# Patient Record
Sex: FEMALE | Race: WHITE | NOT HISPANIC OR LATINO | Employment: UNEMPLOYED | URBAN - METROPOLITAN AREA
[De-identification: names, ages, dates, MRNs, and addresses within clinical notes are randomized per-mention and may not be internally consistent; named-entity substitution may affect disease eponyms.]

---

## 2020-01-09 LAB — NONINV COLON CA DNA+OCC BLD SCRN STL QL: NEGATIVE

## 2020-08-10 LAB
BUN SERPL-MCNC: 21 MG/DL (ref 7–18)
CHOLEST SERPL-MCNC: 212 MG/DL
CREAT SERPL-MCNC: 0.7 MG/DL (ref 0.6–1.3)
GLUCOSE SERPL-MCNC: 252 MG/DL (ref 74–106)
HDLC SERPL-MCNC: 39 MG/DL (ref 35–60)
TRIGL SERPL-MCNC: 499 MG/DL (ref 30–150)

## 2022-01-18 ENCOUNTER — HISTORICAL (OUTPATIENT)
Dept: LAB | Facility: HOSPITAL | Age: 57
End: 2022-01-18

## 2022-01-18 LAB
ABS NEUT (OLG): 4.97 X10(3)/MCL (ref 2.1–9.2)
ALBUMIN SERPL-MCNC: 3.5 GM/DL (ref 3.5–5)
ALBUMIN/GLOB SERPL: 0.9 RATIO (ref 1.1–2)
ALP SERPL-CCNC: 108 UNIT/L (ref 40–150)
ALT SERPL-CCNC: 17 UNIT/L (ref 0–55)
APPEARANCE, UA: CLEAR
AST SERPL-CCNC: 18 UNIT/L (ref 5–34)
BACTERIA SPEC CULT: NORMAL
BASOPHILS # BLD AUTO: 0.05 X10(3)/MCL (ref 0–0.2)
BASOPHILS NFR BLD AUTO: 0.5 % (ref 0–1)
BILIRUB SERPL-MCNC: 0.4 MG/DL (ref 0.2–1.2)
BILIRUB UR QL STRIP: NEGATIVE
BILIRUBIN DIRECT+TOT PNL SERPL-MCNC: 0.2 MG/DL (ref 0–0.5)
BILIRUBIN DIRECT+TOT PNL SERPL-MCNC: 0.2 MG/DL (ref 0–0.8)
BUN SERPL-MCNC: 34.8 MG/DL (ref 9.8–20.1)
CALCIUM SERPL-MCNC: 9.8 MG/DL (ref 8.4–10.2)
CHLORIDE SERPL-SCNC: 96 MMOL/L (ref 98–107)
CHOLEST SERPL-MCNC: 390 MG/DL
CHOLEST/HDLC SERPL: 11 {RATIO} (ref 0–5)
CO2 SERPL-SCNC: 26 MMOL/L (ref 22–29)
COLOR UR: YELLOW
CREAT SERPL-MCNC: 1.16 MG/DL (ref 0.57–1.11)
CREAT UR-MCNC: 88.3 MG/DL (ref 45–106)
EOSINOPHIL # BLD AUTO: 0.31 X10(3)/MCL (ref 0–0.9)
EOSINOPHIL NFR BLD AUTO: 3.3 % (ref 0–6.4)
ERYTHROCYTE [DISTWIDTH] IN BLOOD BY AUTOMATED COUNT: 13.1 % (ref 11.5–17)
EST. AVERAGE GLUCOSE BLD GHB EST-MCNC: 337.9 MG/DL
GLOBULIN SER-MCNC: 3.7 GM/DL (ref 2.4–3.5)
GLUCOSE (UA): ABNORMAL
GLUCOSE SERPL-MCNC: 518 MG/DL (ref 74–100)
HBA1C MFR BLD: 13.4 %
HCT VFR BLD AUTO: 38.6 % (ref 37–47)
HDLC SERPL-MCNC: 37 MG/DL (ref 40–60)
HGB BLD-MCNC: 13.2 GM/DL (ref 12–16)
HGB UR QL STRIP: ABNORMAL
IMM GRANULOCYTES # BLD AUTO: 0.03 10*3/UL (ref 0–0.02)
IMM GRANULOCYTES NFR BLD AUTO: 0.3 % (ref 0–0.43)
KETONES UR QL STRIP: NEGATIVE
LDLC SERPL CALC-MCNC: <69 MG/DL (ref 50–140)
LEUKOCYTE ESTERASE UR QL STRIP: NEGATIVE
LYMPHOCYTES # BLD AUTO: 3.21 X10(3)/MCL (ref 0.6–4.6)
LYMPHOCYTES NFR BLD AUTO: 34.6 % (ref 16–44)
MCH RBC QN AUTO: 30 PG (ref 27–31)
MCHC RBC AUTO-ENTMCNC: 34.2 GM/DL (ref 33–36)
MCV RBC AUTO: 87.7 FL (ref 80–94)
MICROALBUMIN UR-MCNC: 1078.4 UG/ML
MICROALBUMIN/CREAT RATIO PNL UR: 1221.3 MG/GM CR (ref 0–30)
MONOCYTES # BLD AUTO: 0.7 X10(3)/MCL (ref 0.1–1.3)
MONOCYTES NFR BLD AUTO: 7.6 % (ref 4–12.1)
NEUTROPHILS # BLD AUTO: 4.97 X10(3)/MCL (ref 2.1–9.2)
NEUTROPHILS NFR BLD AUTO: 53.7 % (ref 43–73)
NITRITE UR QL STRIP: NEGATIVE
NRBC BLD AUTO-RTO: 0 % (ref 0–0.2)
PH UR STRIP: 5.5 [PH] (ref 5–7)
PLATELET # BLD AUTO: 315 X10(3)/MCL (ref 130–400)
PMV BLD AUTO: 9.2 FL (ref 7.4–10.4)
POTASSIUM SERPL-SCNC: 5.2 MMOL/L (ref 3.5–5.1)
PROT SERPL-MCNC: 7.2 GM/DL (ref 6.4–8.3)
PROT UR QL STRIP: ABNORMAL
RBC # BLD AUTO: 4.4 X10(6)/MCL (ref 4.2–5.4)
RBC #/AREA URNS HPF: NORMAL /HPF
SODIUM SERPL-SCNC: 131 MMOL/L (ref 136–145)
SP GR UR STRIP: >=1.03 (ref 1–1.03)
SQUAMOUS EPITHELIAL, UA: NORMAL /LPF
TRIGL SERPL-MCNC: >1420 MG/DL (ref 0–150)
TSH SERPL-ACNC: 3.27 UIU/ML (ref 0.35–4.94)
UROBILINOGEN UR STRIP-ACNC: NEGATIVE
VLDLC SERPL CALC-MCNC: >284 MG/DL
WBC # SPEC AUTO: 9.3 X10(3)/MCL (ref 4.5–11.5)
WBC #/AREA URNS HPF: NORMAL /HPF

## 2022-05-02 RX ORDER — INSULIN GLARGINE 300 U/ML
120 INJECTION, SOLUTION SUBCUTANEOUS 2 TIMES DAILY
Qty: 4 PEN | Refills: 11 | Status: SHIPPED | OUTPATIENT
Start: 2022-05-02 | End: 2022-05-24 | Stop reason: SDUPTHER

## 2022-05-02 RX ORDER — INSULIN GLARGINE 300 U/ML
120 INJECTION, SOLUTION SUBCUTANEOUS 2 TIMES DAILY
COMMUNITY
Start: 2022-02-16 | End: 2022-05-02 | Stop reason: SDUPTHER

## 2022-05-24 RX ORDER — INSULIN GLARGINE 300 U/ML
120 INJECTION, SOLUTION SUBCUTANEOUS 2 TIMES DAILY
Qty: 4 PEN | Refills: 11 | Status: SHIPPED | OUTPATIENT
Start: 2022-05-24 | End: 2022-06-08 | Stop reason: CLARIF

## 2022-05-24 NOTE — TELEPHONE ENCOUNTER
"Please review and sign:  Armenian from [place] [address] pharmacy called wanting to verify pt's dose of Toujeo which they received as 120 units BID. Pharmacist states this is an abnormally high dose which was "red-flagged" as needing clarification. Spoke with Dr. Weinberg who confirmed he does want this dose and that may increase quantity to 30 day supply. Verbal order given to Armenian at [place] [address] to keep dose as 120 units BID and increase quantity to 30 day supply.   "

## 2022-05-27 ENCOUNTER — TELEPHONE (OUTPATIENT)
Dept: FAMILY MEDICINE | Facility: CLINIC | Age: 57
End: 2022-05-27
Payer: COMMERCIAL

## 2022-05-27 NOTE — TELEPHONE ENCOUNTER
Attempted PA on CMM which failed. Message to submit bill to other processor or primary payer. Verified rx benefits then per recommendation of Counts include 234 beds at the [facility], called [employer] to attempt PA. Kandy with [employer] stated no PA was needed and that pharmacy would need to call to do Cost Override. Called [facility] pharmacy and notified Sabiha in pharmacy of needed Cost Override. She verbalized understanding. Attempted to call pt's . No answer. Left vm advising him to check with pharmacy due to they will be providing the cost override.

## 2022-05-27 NOTE — TELEPHONE ENCOUNTER
----- Message from Natalia Nash sent at 5/26/2022  3:42 PM CDT -----  Regarding: Advice  Type:  Needs Medical Advice    Who Called: Patients    Symptoms (please be specific):   How long has patient had these symptoms:    Pharmacy name and phone #:  [facility] on [address]  Would the patient rather a call back or a response via [facility]? Call  Best Call Back Number: [phone]  Additional Information: Pharmacy sent over a required PA for the office for Patients insulin. He was calling to check the status because pt is almost out of her insulin. He would like a call back.

## 2022-06-03 ENCOUNTER — TELEPHONE (OUTPATIENT)
Dept: FAMILY MEDICINE | Facility: CLINIC | Age: 57
End: 2022-06-03
Payer: COMMERCIAL

## 2022-06-03 NOTE — TELEPHONE ENCOUNTER
----- Message from Jelani Harris sent at 6/3/2022 10:43 AM CDT -----  .Type:  Needs Medical Advice    Who Called: Pt's spouse  Symptoms (please be specific):    How long has patient had these symptoms:    Pharmacy name and phone #:    Would the patient rather a call back or a response via [facility]?   Best Call Back Number:   Additional Information: He was calling to check on status of pre authorization they had not heard anything. Fax # [phone] name of insurance, [employer] name of pharmacy is  [employer].  This is for her insulin and she will be out today.

## 2022-06-03 NOTE — TELEPHONE ENCOUNTER
Called and left pt message that received fax from pharmacy stating no PA needed d/t med covered under plan and for them to check with their pharmacy. See scanned document regarding PA not needed.

## 2022-06-08 ENCOUNTER — TELEPHONE (OUTPATIENT)
Dept: FAMILY MEDICINE | Facility: CLINIC | Age: 57
End: 2022-06-08
Payer: COMMERCIAL

## 2022-06-08 RX ORDER — INSULIN DETEMIR 100 [IU]/ML
100 INJECTION, SOLUTION SUBCUTANEOUS 2 TIMES DAILY
Qty: 60 ML | Refills: 0 | Status: SHIPPED | OUTPATIENT
Start: 2022-06-08 | End: 2023-06-22 | Stop reason: SDUPTHER

## 2022-06-08 NOTE — TELEPHONE ENCOUNTER
----- Message from Aquilino Weinberg MD sent at 6/8/2022  5:26 PM CDT -----  Regarding: RE: Advice  Changed to levemir -- JA        ----- Message -----  From: Rosie Mccloud LPN  Sent: 6/8/2022   5:22 PM CDT  To: Aquilino Weinberg MD  Subject: FW: Advice                                       Please see message below and change Toujeo. Please send [facility] to [facility] [place]. Thanks.   ----- Message -----  From: Natalia Nash  Sent: 6/8/2022   1:19 PM CDT  To: [facility] Davis Staff  Subject: Advice                                           Type:  Needs Medical Advice    Who Called: Patient  Pharmacy name and phone #:  [facility] [place]  Would the patient rather a call back or a response via [facility]? Call  Best Call Back Number: [phone]  Additional Information: Insurance has stopped paying for patients insulin glargine U-300 conc (TOUJEO MAX U-300 SOLOSTAR) 300 unit/mL (3 mL) insulin pen. She is requesting another insulin to be called in at the pharmacy above. Please Advise.

## 2022-07-28 ENCOUNTER — EXTERNAL HOSPITAL ADMISSION (OUTPATIENT)
Dept: ADMINISTRATIVE | Facility: CLINIC | Age: 57
End: 2022-07-28
Payer: COMMERCIAL

## 2022-07-28 ENCOUNTER — PATIENT OUTREACH (OUTPATIENT)
Dept: ADMINISTRATIVE | Facility: CLINIC | Age: 57
End: 2022-07-28
Payer: COMMERCIAL

## 2022-07-28 NOTE — PROGRESS NOTES
C3 nurse attempted to contact Cleopatra Campos for a TCC post hospital discharge follow up call. No answer at phone number listed.     The patient does not have a scheduled HOSFU appointment. Message sent to PCP's staff to assist with HOSFU appointment scheduling.

## 2022-07-29 NOTE — PROGRESS NOTES
Patient's son answered patient's phone and stated she was napping.  Requested a call back later today.

## 2022-07-29 NOTE — PROGRESS NOTES
C3 nurse attempted to contact Cleopatra Campos   for a TCC post hospital discharge follow up call. No answer. Left voicemail with callback information. The patient does not have a scheduled HOSFU appointment. Message sent to PCP staff for assistance with scheduling visit with patient.

## 2022-08-02 ENCOUNTER — PATIENT OUTREACH (OUTPATIENT)
Dept: ADMINISTRATIVE | Facility: HOSPITAL | Age: 57
End: 2022-08-02
Payer: COMMERCIAL

## 2022-08-02 NOTE — PROGRESS NOTES
Population Health Outreach.Records Received, hyper-linked into chart at this time. The following record(s)  below were uploaded for Health Maintenance .             1/9/20 WILLIAMS

## 2022-08-08 ENCOUNTER — OFFICE VISIT (OUTPATIENT)
Dept: FAMILY MEDICINE | Facility: CLINIC | Age: 57
End: 2022-08-08
Payer: COMMERCIAL

## 2022-08-08 VITALS
SYSTOLIC BLOOD PRESSURE: 138 MMHG | DIASTOLIC BLOOD PRESSURE: 87 MMHG | HEART RATE: 66 BPM | TEMPERATURE: 98 F | HEIGHT: 60 IN | BODY MASS INDEX: 39.07 KG/M2 | OXYGEN SATURATION: 97 % | WEIGHT: 199 LBS | RESPIRATION RATE: 22 BRPM

## 2022-08-08 DIAGNOSIS — E78.1 HYPERTRIGLYCERIDEMIA: Chronic | ICD-10-CM

## 2022-08-08 DIAGNOSIS — E78.2 MIXED HYPERLIPIDEMIA: Chronic | ICD-10-CM

## 2022-08-08 DIAGNOSIS — Z09 HOSPITAL DISCHARGE FOLLOW-UP: Primary | ICD-10-CM

## 2022-08-08 DIAGNOSIS — E66.01 CLASS 2 SEVERE OBESITY DUE TO EXCESS CALORIES WITH SERIOUS COMORBIDITY AND BODY MASS INDEX (BMI) OF 38.0 TO 38.9 IN ADULT: Chronic | ICD-10-CM

## 2022-08-08 DIAGNOSIS — I25.2 HISTORY OF MI (MYOCARDIAL INFARCTION): ICD-10-CM

## 2022-08-08 DIAGNOSIS — E11.59 HYPERTENSION ASSOCIATED WITH TYPE 2 DIABETES MELLITUS: Chronic | ICD-10-CM

## 2022-08-08 DIAGNOSIS — E27.1 PRIMARY ADRENAL INSUFFICIENCY: Chronic | ICD-10-CM

## 2022-08-08 DIAGNOSIS — I10 PRIMARY HYPERTENSION: Chronic | ICD-10-CM

## 2022-08-08 DIAGNOSIS — E03.4 ACQUIRED ATROPHY OF THYROID: Chronic | ICD-10-CM

## 2022-08-08 DIAGNOSIS — Z99.81 SUPPLEMENTAL OXYGEN DEPENDENT: ICD-10-CM

## 2022-08-08 DIAGNOSIS — I15.2 HYPERTENSION ASSOCIATED WITH TYPE 2 DIABETES MELLITUS: Chronic | ICD-10-CM

## 2022-08-08 DIAGNOSIS — E11.65 TYPE 2 DIABETES MELLITUS WITH HYPERGLYCEMIA, WITH LONG-TERM CURRENT USE OF INSULIN: Chronic | ICD-10-CM

## 2022-08-08 DIAGNOSIS — E11.69 MIXED HYPERLIPIDEMIA DUE TO TYPE 2 DIABETES MELLITUS: Chronic | ICD-10-CM

## 2022-08-08 DIAGNOSIS — I25.118 CORONARY ARTERY DISEASE OF NATIVE ARTERY OF NATIVE HEART WITH STABLE ANGINA PECTORIS: Chronic | ICD-10-CM

## 2022-08-08 DIAGNOSIS — Z79.4 LONG-TERM INSULIN USE: Chronic | ICD-10-CM

## 2022-08-08 DIAGNOSIS — Z79.4 TYPE 2 DIABETES MELLITUS WITH HYPERGLYCEMIA, WITH LONG-TERM CURRENT USE OF INSULIN: Chronic | ICD-10-CM

## 2022-08-08 DIAGNOSIS — Z79.4 TYPE 2 DIABETES MELLITUS WITH STAGE 3A CHRONIC KIDNEY DISEASE, WITH LONG-TERM CURRENT USE OF INSULIN: Chronic | ICD-10-CM

## 2022-08-08 DIAGNOSIS — I48.11 LONGSTANDING PERSISTENT ATRIAL FIBRILLATION: Chronic | ICD-10-CM

## 2022-08-08 DIAGNOSIS — Z95.1 S/P CABG (CORONARY ARTERY BYPASS GRAFT): Chronic | ICD-10-CM

## 2022-08-08 DIAGNOSIS — E11.22 TYPE 2 DIABETES MELLITUS WITH STAGE 3A CHRONIC KIDNEY DISEASE, WITH LONG-TERM CURRENT USE OF INSULIN: Chronic | ICD-10-CM

## 2022-08-08 DIAGNOSIS — N18.31 TYPE 2 DIABETES MELLITUS WITH STAGE 3A CHRONIC KIDNEY DISEASE, WITH LONG-TERM CURRENT USE OF INSULIN: Chronic | ICD-10-CM

## 2022-08-08 DIAGNOSIS — N18.31 CHRONIC KIDNEY DISEASE, STAGE 3A: Chronic | ICD-10-CM

## 2022-08-08 DIAGNOSIS — E78.2 MIXED HYPERLIPIDEMIA DUE TO TYPE 2 DIABETES MELLITUS: Chronic | ICD-10-CM

## 2022-08-08 PROBLEM — Z78.9 ACE INHIBITOR INTOLERANCE: Status: ACTIVE | Noted: 2022-08-08

## 2022-08-08 PROBLEM — R39.9 SYMPTOMS INVOLVING URINARY SYSTEM: Status: ACTIVE | Noted: 2022-08-08

## 2022-08-08 PROBLEM — I82.412 ACUTE DEEP VEIN THROMBOSIS (DVT) OF FEMORAL VEIN OF LEFT LOWER EXTREMITY: Status: ACTIVE | Noted: 2022-07-03

## 2022-08-08 PROBLEM — M19.90 OSTEOARTHRITIS: Status: ACTIVE | Noted: 2022-08-08

## 2022-08-08 PROBLEM — I82.A12 ACUTE DEEP VEIN THROMBOSIS (DVT) OF AXILLARY VEIN OF LEFT UPPER EXTREMITY: Status: ACTIVE | Noted: 2022-07-27

## 2022-08-08 PROBLEM — E03.9 HYPOTHYROIDISM: Status: ACTIVE | Noted: 2022-07-03

## 2022-08-08 PROBLEM — Z90.710 HISTORY OF HYSTERECTOMY: Chronic | Status: ACTIVE | Noted: 2022-08-08

## 2022-08-08 PROBLEM — I48.91 ATRIAL FIBRILLATION: Chronic | Status: ACTIVE | Noted: 2022-07-14

## 2022-08-08 PROBLEM — I48.91 ATRIAL FIBRILLATION: Status: ACTIVE | Noted: 2022-07-14

## 2022-08-08 PROBLEM — R40.0 DAYTIME SOMNOLENCE: Status: ACTIVE | Noted: 2022-08-08

## 2022-08-08 PROBLEM — F60.3 EMOTIONAL INSTABILITY: Status: ACTIVE | Noted: 2022-08-08

## 2022-08-08 PROBLEM — Z12.31 BREAST CANCER SCREENING BY MAMMOGRAM: Status: ACTIVE | Noted: 2022-08-08

## 2022-08-08 PROBLEM — E66.812 CLASS 2 SEVERE OBESITY DUE TO EXCESS CALORIES WITH SERIOUS COMORBIDITY AND BODY MASS INDEX (BMI) OF 38.0 TO 38.9 IN ADULT: Chronic | Status: ACTIVE | Noted: 2022-08-08

## 2022-08-08 PROBLEM — E11.9 TYPE 2 DIABETES MELLITUS: Status: RESOLVED | Noted: 2022-07-03 | Resolved: 2022-08-08

## 2022-08-08 PROBLEM — T46.4X5A ADVERSE EFFECT OF ANGIOTENSIN-CONVERTING ENZYME INHIBITOR: Status: ACTIVE | Noted: 2022-08-08

## 2022-08-08 PROBLEM — E11.9 TYPE 2 DIABETES MELLITUS: Status: ACTIVE | Noted: 2022-07-03

## 2022-08-08 PROBLEM — E03.9 HYPOTHYROIDISM: Status: RESOLVED | Noted: 2022-07-03 | Resolved: 2022-08-08

## 2022-08-08 PROBLEM — G47.10 HYPERSOMNIA: Status: ACTIVE | Noted: 2022-08-08

## 2022-08-08 PROBLEM — D75.829 HIT (HEPARIN-INDUCED THROMBOCYTOPENIA): Status: ACTIVE | Noted: 2022-07-27

## 2022-08-08 PROBLEM — Z90.710 HISTORY OF HYSTERECTOMY: Status: ACTIVE | Noted: 2022-08-08

## 2022-08-08 PROCEDURE — 99495 TRANSJ CARE MGMT MOD F2F 14D: CPT | Mod: ,,, | Performed by: FAMILY MEDICINE

## 2022-08-08 PROCEDURE — 99495 TCM SERVICES (MODERATE COMPLEXITY): ICD-10-PCS | Mod: ,,, | Performed by: FAMILY MEDICINE

## 2022-08-08 RX ORDER — DULOXETIN HYDROCHLORIDE 30 MG/1
30 CAPSULE, DELAYED RELEASE ORAL DAILY
COMMUNITY
Start: 2022-01-28 | End: 2023-01-05 | Stop reason: SDUPTHER

## 2022-08-08 RX ORDER — HYDROCORTISONE 5 MG/1
15 TABLET ORAL DAILY
COMMUNITY
Start: 2022-07-28 | End: 2023-01-23

## 2022-08-08 RX ORDER — ONDANSETRON 4 MG/1
4 TABLET, FILM COATED ORAL EVERY 8 HOURS PRN
COMMUNITY
Start: 2022-08-05 | End: 2023-01-23 | Stop reason: SDUPTHER

## 2022-08-08 RX ORDER — LOSARTAN POTASSIUM 100 MG/1
1 TABLET ORAL DAILY
COMMUNITY
Start: 2022-07-28 | End: 2022-08-29 | Stop reason: SDUPTHER

## 2022-08-08 RX ORDER — INSULIN PUMP SYRINGE, 3 ML
EACH MISCELLANEOUS
Qty: 1 EACH | Refills: 0 | Status: SHIPPED | OUTPATIENT
Start: 2022-08-08 | End: 2023-06-22

## 2022-08-08 RX ORDER — METOPROLOL TARTRATE 50 MG/1
1 TABLET ORAL 2 TIMES DAILY
COMMUNITY
Start: 2022-07-27 | End: 2022-08-29 | Stop reason: SDUPTHER

## 2022-08-08 RX ORDER — FENOFIBRATE 160 MG/1
160 TABLET ORAL DAILY
COMMUNITY
Start: 2021-09-13

## 2022-08-08 RX ORDER — ATORVASTATIN CALCIUM 80 MG/1
80 TABLET, FILM COATED ORAL DAILY
COMMUNITY
Start: 2022-02-18 | End: 2023-06-22 | Stop reason: SDUPTHER

## 2022-08-08 RX ORDER — PEN NEEDLE, DIABETIC 32GX 5/32"
NEEDLE, DISPOSABLE MISCELLANEOUS
COMMUNITY
Start: 2022-06-09

## 2022-08-08 RX ORDER — APIXABAN 5 MG/1
1 TABLET, FILM COATED ORAL 2 TIMES DAILY
COMMUNITY
Start: 2022-08-01 | End: 2022-08-29 | Stop reason: SDUPTHER

## 2022-08-08 RX ORDER — HYDROCORTISONE 10 MG/1
10 TABLET ORAL DAILY
COMMUNITY
Start: 2022-07-27 | End: 2023-01-23

## 2022-08-08 RX ORDER — LIRAGLUTIDE 6 MG/ML
1.8 INJECTION SUBCUTANEOUS DAILY
COMMUNITY
Start: 2021-11-30 | End: 2023-01-05 | Stop reason: ALTCHOICE

## 2022-08-08 RX ORDER — LEVOTHYROXINE SODIUM 100 UG/1
100 TABLET ORAL DAILY
COMMUNITY
Start: 2021-11-22 | End: 2023-01-23 | Stop reason: SDUPTHER

## 2022-08-08 NOTE — PROGRESS NOTES
Transitional Care Note    Family and/or Caretaker present at visit?  Yes.  Diagnostic tests reviewed/disposition: No diagnosic tests pending after this hospitalization.  Disease/illness education: yes, especially therapy (cardiac rehab)  Home health/community services discussion/referrals: Patient has home health established at [facility].   Establishment or re-establishment of referral orders for community resources: No other necessary community resources.   Discussion with other health care providers: No discussion with other health care providers necessary. - NEED DISCHARGE SUMMARY               Subjective:      Patient ID: Cleopatra Campos is a 57 y.o. female.    Chief Complaint: Follow-up (Pt here for post-hospitalization f/u. S/p CABG x2/MI on 7/4/22 at [facility]. Pt now on O2 at 2 lpm due to SOB on exertion. )    Problem List Items Addressed This Visit     Acquired atrophy of thyroid (Chronic)    Atrial fibrillation (Chronic)    Overview     Last Assessment & Plan:   Formatting of this note might be different from the original.  Her ECG from several years ago clearly shows a narrow complex tachycardia. Difficult to know whether the VT or the SVT has been the most symptomatic, but I do think there are two distinct arrhythmias.           Coronary artery disease of native artery of native heart with stable angina pectoris (Chronic)    Overview     Formatting of this note might be different from the original.  Added automatically from request for surgery [id number]           Primary hypertension (Chronic)    Hypertriglyceridemia (Chronic)    Mixed hyperlipidemia (Chronic)    Class 2 severe obesity due to excess calories with serious comorbidity and body mass index (BMI) of 38.0 to 38.9 in adult (Chronic)    Primary adrenal insufficiency (Chronic)    S/P CABG (coronary artery bypass graft) (Chronic)    Overview     Last Assessment & Plan:   Formatting of this note might be different from the original.  s/p CABG x 2,  sternal plating on 7/4/22  Postop management per CTS.  Patient stable on 3 L via nasal cannula.  Chest x-ray revealed continued left sided airspace opacities with no interval detrimental change.  Continue mobilization as tolerated  Encourage IS use, 10 times every hour  PT/OT  Chest tubes removed today.           Type 2 diabetes mellitus with hyperglycemia (Chronic)    Relevant Medications    liraglutide 0.6 mg/0.1 mL, 18 mg/3 mL, subq PNIJ (VICTOZA 2-DREW) 0.6 mg/0.1 mL (18 mg/3 mL) PnIj pen    Long-term insulin use (Chronic)    Chronic kidney disease, stage 3a (Chronic)    Type 2 diabetes mellitus with stage 3a chronic kidney disease (Chronic)    Relevant Medications    liraglutide 0.6 mg/0.1 mL, 18 mg/3 mL, subq PNIJ (VICTOZA 2-DREW) 0.6 mg/0.1 mL (18 mg/3 mL) PnIj pen    Mixed hyperlipidemia due to type 2 diabetes mellitus (Chronic)    Relevant Medications    liraglutide 0.6 mg/0.1 mL, 18 mg/3 mL, subq PNIJ (VICTOZA 2-DREW) 0.6 mg/0.1 mL (18 mg/3 mL) PnIj pen    Hypertension associated with type 2 diabetes mellitus (Chronic)    Relevant Medications    liraglutide 0.6 mg/0.1 mL, 18 mg/3 mL, subq PNIJ (VICTOZA 2-DREW) 0.6 mg/0.1 mL (18 mg/3 mL) PnIj pen    BMI 38.0-38.9,adult (Chronic)    History of MI (myocardial infarction)    Hospital discharge follow-up - Primary      Other Visit Diagnoses     Supplemental oxygen dependent              The patient's Health Maintenance was reviewed and the following appears to be due:   Health Maintenance Due   Topic Date Due    Hepatitis C Screening  Never done    HIV Screening  Never done    TETANUS VACCINE  Never done    Mammogram  Never done    Shingles Vaccine (1 of 2) Never done    COVID-19 Vaccine (2 - Mixed Product series) 11/18/2021       Past Medical History:  Past Medical History:   Diagnosis Date    Clotting disorder     Coronary artery disease     Diabetes mellitus, type 2     DVT (deep venous thrombosis)     Heart attack     Hyperlipidemia      "Hypertension     Hypothyroidism     Thyroid disease      Past Surgical History:   Procedure Laterality Date    APPENDECTOMY      CARDIAC CATHETERIZATION      CARPAL TUNNEL RELEASE       SECTION      CORONARY ARTERY BYPASS GRAFT      HYSTERECTOMY      ROTATOR CUFF REPAIR Right     TRIGGER FINGER RELEASE       Review of patient's allergies indicates:   Allergen Reactions    Enoxaparin Other (See Comments)     Heparin induced thrombocytopenia     Meperidine Swelling     Current Outpatient Medications on File Prior to Visit   Medication Sig Dispense Refill    atorvastatin (LIPITOR) 80 MG tablet Take 80 mg by mouth once daily.      BD GINA 2ND GEN PEN NEEDLE 32 gauge x 5/32" Ndle       DULoxetine (CYMBALTA) 30 MG capsule Take 30 mg by mouth once daily.      ELIQUIS 5 mg Tab Take 1 tablet by mouth 2 (two) times a day.      fenofibrate 160 MG Tab Take 160 mg by mouth once daily.      hydrocortisone (CORTEF) 10 MG Tab Take 10 mg by mouth once daily. At 3 pm      hydrocortisone (CORTEF) 5 MG Tab Take 15 mg by mouth once daily. With breakfast.      insulin detemir U-100 (LEVEMIR FLEXTOUCH U-100 INSULN) 100 unit/mL (3 mL) InPn pen Inject 100 Units into the skin 2 (two) times daily. 60 mL 0    levothyroxine (SYNTHROID) 100 MCG tablet Take 100 mcg by mouth once daily.      liraglutide 0.6 mg/0.1 mL, 18 mg/3 mL, subq PNIJ (VICTOZA 2-DREW) 0.6 mg/0.1 mL (18 mg/3 mL) PnIj pen Inject 1.8 mg into the skin once daily.      losartan (COZAAR) 100 MG tablet Take 1 tablet by mouth once daily.      metoprolol tartrate (LOPRESSOR) 50 MG tablet Take 1 tablet by mouth 2 (two) times daily.      ondansetron (ZOFRAN) 4 MG tablet Take 4 mg by mouth every 8 (eight) hours as needed.       Current Facility-Administered Medications on File Prior to Visit   Medication Dose Route Frequency Provider Last Rate Last Admin    [DISCONTINUED] GENERIC EXTERNAL MEDICATION     Generic External Data Provider        " [DISCONTINUED] GENERIC EXTERNAL MEDICATION     Generic External Data Provider        [DISCONTINUED] GENERIC EXTERNAL MEDICATION     Generic External Data Provider         Social History     Socioeconomic History    Marital status:     Number of children: 3   Occupational History    Occupation: Retired    Tobacco Use    Smoking status: Former Smoker    Smokeless tobacco: Never Used    Tobacco comment: Pt quit as of recent hospitalization for CABG/MI.    Substance and Sexual Activity    Alcohol use: Not Currently    Drug use: Never     Family History   Problem Relation Age of Onset    Diabetes Mother     Cancer Father        Review of Systems   Constitutional: Positive for fatigue.   Respiratory: Positive for shortness of breath.    Neurological: Positive for weakness.   All other systems reviewed and are negative.      Objective:   /87 (BP Location: Right arm, Patient Position: Sitting, BP Method: Large (Automatic))   Pulse 66   Temp 97.8 °F (36.6 °C) (Temporal)   Resp (!) 22   Ht 5' (1.524 m)   Wt 90.3 kg (199 lb)   SpO2 97%   BMI 38.86 kg/m²     Physical Exam  Vitals and nursing note reviewed.   Constitutional:       Appearance: Normal appearance. She is obese.      Interventions: Nasal cannula in place.      Comments: Supplemental O2   HENT:      Head: Normocephalic and atraumatic.      Right Ear: Tympanic membrane, ear canal and external ear normal.      Left Ear: Tympanic membrane, ear canal and external ear normal.      Nose: Nose normal.      Mouth/Throat:      Mouth: Mucous membranes are moist.      Pharynx: Oropharynx is clear.   Eyes:      Extraocular Movements: Extraocular movements intact.      Conjunctiva/sclera: Conjunctivae normal.      Pupils: Pupils are equal, round, and reactive to light.   Cardiovascular:      Rate and Rhythm: Normal rate. Rhythm irregularly irregular.      Pulses: Normal pulses.      Heart sounds: Normal heart sounds.   Pulmonary:      Effort:  Pulmonary effort is normal.      Breath sounds: Normal breath sounds and air entry.   Abdominal:      General: Abdomen is flat. Bowel sounds are normal.      Palpations: Abdomen is soft.   Musculoskeletal:         General: Normal range of motion.      Cervical back: Normal range of motion and neck supple.   Skin:     General: Skin is warm and dry.      Capillary Refill: Capillary refill takes 2 to 3 seconds.   Neurological:      General: No focal deficit present.      Mental Status: She is alert and oriented to person, place, and time. Mental status is at baseline.   Psychiatric:         Mood and Affect: Mood normal.         Behavior: Behavior normal.         Thought Content: Thought content normal.         Judgment: Judgment normal.         Patient Outreach on 08/02/2022   Component Date Value Ref Range Status    Cologuard Result 01/09/2020 negative   Final-Edited       No image results found.       Assessment:     1. Hospital discharge follow-up    2. History of MI (myocardial infarction)    3. S/P CABG (coronary artery bypass graft)    4. Coronary artery disease of native artery of native heart with stable angina pectoris    5. Supplemental oxygen dependent    6. Longstanding persistent atrial fibrillation    7. Primary adrenal insufficiency    8. Chronic kidney disease, stage 3a    9. Type 2 diabetes mellitus with stage 3a chronic kidney disease, with long-term current use of insulin    10. Mixed hyperlipidemia due to type 2 diabetes mellitus    11. Hypertension associated with type 2 diabetes mellitus    12. Type 2 diabetes mellitus with hyperglycemia, with long-term current use of insulin    13. Long-term insulin use    14. Primary hypertension    15. Mixed hyperlipidemia    16. Hypertriglyceridemia    17. Acquired atrophy of thyroid    18. BMI 38.0-38.9,adult    19. Class 2 severe obesity due to excess calories with serious comorbidity and body mass index (BMI) of 38.0 to 38.9 in adult      Plan:   I am  having Cleopatra Campos maintain her LEVEMIR FLEXTOUCH U-100 INSULN, ELIQUIS, atorvastatin, DULoxetine, fenofibrate, hydrocortisone, hydrocortisone, levothyroxine, losartan, metoprolol tartrate, ondansetron, BD GINA 2ND GEN PEN NEEDLE, and VICTOZA 2-DREW.  Problem List Items Addressed This Visit     Acquired atrophy of thyroid (Chronic)    Atrial fibrillation (Chronic)    Coronary artery disease of native artery of native heart with stable angina pectoris (Chronic)    Primary hypertension (Chronic)    Hypertriglyceridemia (Chronic)    Mixed hyperlipidemia (Chronic)    Class 2 severe obesity due to excess calories with serious comorbidity and body mass index (BMI) of 38.0 to 38.9 in adult (Chronic)    Primary adrenal insufficiency (Chronic)    S/P CABG (coronary artery bypass graft) (Chronic)    Type 2 diabetes mellitus with hyperglycemia (Chronic)    Relevant Medications    liraglutide 0.6 mg/0.1 mL, 18 mg/3 mL, subq PNIJ (VICTOZA 2-DREW) 0.6 mg/0.1 mL (18 mg/3 mL) PnIj pen    Long-term insulin use (Chronic)    Chronic kidney disease, stage 3a (Chronic)    Type 2 diabetes mellitus with stage 3a chronic kidney disease (Chronic)    Relevant Medications    liraglutide 0.6 mg/0.1 mL, 18 mg/3 mL, subq PNIJ (VICTOZA 2-DREW) 0.6 mg/0.1 mL (18 mg/3 mL) PnIj pen    Mixed hyperlipidemia due to type 2 diabetes mellitus (Chronic)    Relevant Medications    liraglutide 0.6 mg/0.1 mL, 18 mg/3 mL, subq PNIJ (VICTOZA 2-DREW) 0.6 mg/0.1 mL (18 mg/3 mL) PnIj pen    Hypertension associated with type 2 diabetes mellitus (Chronic)    Relevant Medications    liraglutide 0.6 mg/0.1 mL, 18 mg/3 mL, subq PNIJ (VICTOZA 2-DREW) 0.6 mg/0.1 mL (18 mg/3 mL) PnIj pen    BMI 38.0-38.9,adult (Chronic)    History of MI (myocardial infarction)    Hospital discharge follow-up - Primary      Other Visit Diagnoses     Supplemental oxygen dependent            Follow up in about 3 months (around 11/8/2022).    Cleopatra was seen today for follow-up.    Diagnoses  and all orders for this visit:    Hospital discharge follow-up    Hospitalized for...  History of MI (myocardial infarction)  S/P CABG (coronary artery bypass graft)  Coronary artery disease of native artery of native heart with stable angina pectoris    Narrative - Was discharged 10 days ago. Has home health with PT, and appropriate specialist follow-ups scheduled. Denies any acute needs.   Denies needs for refills at this time    Is now...  Supplemental oxygen dependent - discussed the hopeful temporary status of this new state. Continued compliance with rehab should decrease the need for this long-term  Longstanding persistent atrial fibrillation - likely permanent new state, including need for permanent anticoagulation  Primary adrenal insufficiency - uncertain permanence of this new state. Is seeing endocrine for this soon.    Chronic/contributing conditions of...  Chronic kidney disease, stage 3a - stable, not new, needs routine surveillance to resume  Type 2 diabetes mellitus with stage 3a chronic kidney disease, with long-term current use of insulin -- stable, not new, needs routine surveillance to resume  Mixed hyperlipidemia due to type 2 diabetes mellitus- stable, not new, needs routine surveillance to resume  Hypertension associated with type 2 diabetes mellitus- stable, not new, needs routine surveillance to resume  Type 2 diabetes mellitus with hyperglycemia, with long-term current use of insulin- stable, not new, needs routine surveillance to resume  Long-term insulin use- stable, not new, needs routine surveillance to resume  Primary hypertension - controlled at this time. Changes belong to cardiology until released  Mixed hyperlipidemia -- stable, not new, needs routine surveillance to resume  Hypertriglyceridemia- stable, not new, needs routine surveillance to resume. Needs to be on fenofibrate as well as lipitor (maxed out). Will probably be fighting insurance on this. Cardiology may try to begin  "repatha or similar (please).  Acquired atrophy of thyroid - - stable, not new, needs routine surveillance to resume  BMI 38.0-38.9,adult  Class 2 severe obesity due to excess calories with serious comorbidity and body mass index (BMI) of 38.0 to 38.9 in adult  Documented for chart completeness and HCC        RTC 3 months to regroup, catch up ion surveillance of chronic conditions in a controlled setting, and begin to address PrevMed needs/gaps.     [unfilled]  No orders of the defined types were placed in this encounter.      Medication List with Changes/Refills   Current Medications    ATORVASTATIN (LIPITOR) 80 MG TABLET    Take 80 mg by mouth once daily.    BD GINA 2ND GEN PEN NEEDLE 32 GAUGE X 5/32" NDLE        DULOXETINE (CYMBALTA) 30 MG CAPSULE    Take 30 mg by mouth once daily.    ELIQUIS 5 MG TAB    Take 1 tablet by mouth 2 (two) times a day.    FENOFIBRATE 160 MG TAB    Take 160 mg by mouth once daily.    HYDROCORTISONE (CORTEF) 10 MG TAB    Take 10 mg by mouth once daily. At 3 pm    HYDROCORTISONE (CORTEF) 5 MG TAB    Take 15 mg by mouth once daily. With breakfast.    INSULIN DETEMIR U-100 (LEVEMIR FLEXTOUCH U-100 INSULN) 100 UNIT/ML (3 ML) INPN PEN    Inject 100 Units into the skin 2 (two) times daily.    LEVOTHYROXINE (SYNTHROID) 100 MCG TABLET    Take 100 mcg by mouth once daily.    LIRAGLUTIDE 0.6 MG/0.1 ML, 18 MG/3 ML, SUBQ PNIJ (VICTOZA 2-DREW) 0.6 MG/0.1 ML (18 MG/3 ML) PNIJ PEN    Inject 1.8 mg into the skin once daily.    LOSARTAN (COZAAR) 100 MG TABLET    Take 1 tablet by mouth once daily.    METOPROLOL TARTRATE (LOPRESSOR) 50 MG TABLET    Take 1 tablet by mouth 2 (two) times daily.    ONDANSETRON (ZOFRAN) 4 MG TABLET    Take 4 mg by mouth every 8 (eight) hours as needed.      Medication List with Changes/Refills   Current Medications    ATORVASTATIN (LIPITOR) 80 MG TABLET    Take 80 mg by mouth once daily.       Start Date: 2/18/2022 End Date: --    BD GINA 2ND GEN PEN NEEDLE 32 GAUGE X 5/32" NDLE "           Start Date: 6/9/2022  End Date: --    DULOXETINE (CYMBALTA) 30 MG CAPSULE    Take 30 mg by mouth once daily.       Start Date: 1/28/2022 End Date: --    ELIQUIS 5 MG TAB    Take 1 tablet by mouth 2 (two) times a day.       Start Date: 8/1/2022  End Date: --    FENOFIBRATE 160 MG TAB    Take 160 mg by mouth once daily.       Start Date: 9/13/2021 End Date: --    HYDROCORTISONE (CORTEF) 10 MG TAB    Take 10 mg by mouth once daily. At 3 pm       Start Date: 7/27/2022 End Date: --    HYDROCORTISONE (CORTEF) 5 MG TAB    Take 15 mg by mouth once daily. With breakfast.       Start Date: 7/28/2022 End Date: --    INSULIN DETEMIR U-100 (LEVEMIR FLEXTOUCH U-100 INSULN) 100 UNIT/ML (3 ML) INPN PEN    Inject 100 Units into the skin 2 (two) times daily.       Start Date: 6/8/2022  End Date: 8/8/2023    LEVOTHYROXINE (SYNTHROID) 100 MCG TABLET    Take 100 mcg by mouth once daily.       Start Date: 11/22/2021End Date: --    LIRAGLUTIDE 0.6 MG/0.1 ML, 18 MG/3 ML, SUBQ PNIJ (VICTOZA 2-DREW) 0.6 MG/0.1 ML (18 MG/3 ML) PNIJ PEN    Inject 1.8 mg into the skin once daily.       Start Date: 11/30/2021End Date: --    LOSARTAN (COZAAR) 100 MG TABLET    Take 1 tablet by mouth once daily.       Start Date: 7/28/2022 End Date: 7/28/2023    METOPROLOL TARTRATE (LOPRESSOR) 50 MG TABLET    Take 1 tablet by mouth 2 (two) times daily.       Start Date: 7/27/2022 End Date: 7/27/2023    ONDANSETRON (ZOFRAN) 4 MG TABLET    Take 4 mg by mouth every 8 (eight) hours as needed.       Start Date: 8/5/2022  End Date: --

## 2022-08-11 ENCOUNTER — TELEPHONE (OUTPATIENT)
Dept: FAMILY MEDICINE | Facility: CLINIC | Age: 57
End: 2022-08-11
Payer: COMMERCIAL

## 2022-08-12 NOTE — TELEPHONE ENCOUNTER
Called refills into Sabiha at [place] [address] pharmacy. Pt notified and verbalized understanding.

## 2022-08-12 NOTE — TELEPHONE ENCOUNTER
Pt notified that per Dr. Weinberg in last OV note on 8/8, pt to continue Levemir and Victoza, not Toujeo. Called refills into Sabiha at [facility] pharmacy. Pt notified and verbalized understanding.

## 2022-08-12 NOTE — TELEPHONE ENCOUNTER
----- Message from Leticia White sent at 8/12/2022  8:14 AM CDT -----  Regarding: med refill  .Type:  RX Refill Request    Who Called: pt   Refill or New Rx:refill  RX Name and Strength:insulin detemir U-100 (LEVEMIR FLEXTOUCH U-100 INSULN) 100 unit/mL (3 mL) InPn pen  How is the patient currently taking it? (ex. 1XDay):2xday  Is this a 30 day or 90 day RX:30  Preferred Pharmacy with phone number:[place] [address] [facility]  Local or Mail Order:local   Ordering Provider:Sultana   Would the patient rather a call back or a response via [facility]? Call back   Best Call Back Number:[phone]  Additional Information:

## 2022-08-29 DIAGNOSIS — I48.91 ATRIAL FIBRILLATION, UNSPECIFIED TYPE: Primary | ICD-10-CM

## 2022-08-29 DIAGNOSIS — I10 HYPERTENSION, UNSPECIFIED TYPE: ICD-10-CM

## 2022-08-30 RX ORDER — LOSARTAN POTASSIUM 100 MG/1
100 TABLET ORAL DAILY
Qty: 90 TABLET | Refills: 3 | Status: SHIPPED | OUTPATIENT
Start: 2022-08-30 | End: 2023-09-26 | Stop reason: SDUPTHER

## 2022-08-30 RX ORDER — METOPROLOL TARTRATE 50 MG/1
50 TABLET ORAL 2 TIMES DAILY
Qty: 180 TABLET | Refills: 3 | Status: SHIPPED | OUTPATIENT
Start: 2022-08-30 | End: 2023-09-18 | Stop reason: SDUPTHER

## 2022-08-30 RX ORDER — APIXABAN 5 MG/1
5 TABLET, FILM COATED ORAL 2 TIMES DAILY
Qty: 180 TABLET | Refills: 3 | Status: SHIPPED | OUTPATIENT
Start: 2022-08-30 | End: 2022-11-14 | Stop reason: SDUPTHER

## 2022-09-09 ENCOUNTER — LAB REQUISITION (OUTPATIENT)
Dept: LAB | Facility: HOSPITAL | Age: 57
End: 2022-09-09
Payer: COMMERCIAL

## 2022-09-09 DIAGNOSIS — I48.91 UNSPECIFIED ATRIAL FIBRILLATION: ICD-10-CM

## 2022-09-09 DIAGNOSIS — Z79.01 LONG TERM (CURRENT) USE OF ANTICOAGULANTS: ICD-10-CM

## 2022-09-09 DIAGNOSIS — E11.9 TYPE 2 DIABETES MELLITUS WITHOUT COMPLICATIONS: ICD-10-CM

## 2022-09-09 DIAGNOSIS — I25.118 ATHEROSCLEROTIC HEART DISEASE OF NATIVE CORONARY ARTERY WITH OTHER FORMS OF ANGINA PECTORIS: ICD-10-CM

## 2022-09-09 DIAGNOSIS — I82.412 ACUTE EMBOLISM AND THROMBOSIS OF LEFT FEMORAL VEIN: ICD-10-CM

## 2022-09-09 DIAGNOSIS — Z95.1 PRESENCE OF AORTOCORONARY BYPASS GRAFT: ICD-10-CM

## 2022-09-09 LAB
ALBUMIN SERPL-MCNC: 2.8 GM/DL (ref 3.5–5)
ALBUMIN/GLOB SERPL: 0.8 RATIO (ref 1.1–2)
ALP SERPL-CCNC: 298 UNIT/L (ref 40–150)
ALT SERPL-CCNC: 15 UNIT/L (ref 0–55)
AST SERPL-CCNC: 14 UNIT/L (ref 5–34)
BASOPHILS # BLD AUTO: 0.03 X10(3)/MCL (ref 0–0.2)
BASOPHILS NFR BLD AUTO: 0.3 %
BILIRUBIN DIRECT+TOT PNL SERPL-MCNC: 0.4 MG/DL
BUN SERPL-MCNC: 41 MG/DL (ref 9.8–20.1)
CALCIUM SERPL-MCNC: 8.9 MG/DL (ref 8.4–10.2)
CHLORIDE SERPL-SCNC: 100 MMOL/L (ref 98–107)
CO2 SERPL-SCNC: 27 MMOL/L (ref 22–29)
CREAT SERPL-MCNC: 3.36 MG/DL (ref 0.55–1.02)
EOSINOPHIL # BLD AUTO: 0.11 X10(3)/MCL (ref 0–0.9)
EOSINOPHIL NFR BLD AUTO: 1.1 %
ERYTHROCYTE [DISTWIDTH] IN BLOOD BY AUTOMATED COUNT: 14.6 % (ref 11.5–17)
GFR SERPLBLD CREATININE-BSD FMLA CKD-EPI: 15 MLS/MIN/1.73/M2
GLOBULIN SER-MCNC: 3.7 GM/DL (ref 2.4–3.5)
GLUCOSE SERPL-MCNC: 197 MG/DL (ref 74–100)
HCT VFR BLD AUTO: 31.7 % (ref 37–47)
HGB BLD-MCNC: 10 GM/DL (ref 12–16)
IMM GRANULOCYTES # BLD AUTO: 0.03 X10(3)/MCL (ref 0–0.04)
IMM GRANULOCYTES NFR BLD AUTO: 0.3 %
LYMPHOCYTES # BLD AUTO: 3.36 X10(3)/MCL (ref 0.6–4.6)
LYMPHOCYTES NFR BLD AUTO: 34.1 %
MCH RBC QN AUTO: 29.2 PG (ref 27–31)
MCHC RBC AUTO-ENTMCNC: 31.5 MG/DL (ref 33–36)
MCV RBC AUTO: 92.4 FL (ref 80–94)
MONOCYTES # BLD AUTO: 0.87 X10(3)/MCL (ref 0.1–1.3)
MONOCYTES NFR BLD AUTO: 8.8 %
NEUTROPHILS # BLD AUTO: 5.5 X10(3)/MCL (ref 2.1–9.2)
NEUTROPHILS NFR BLD AUTO: 55.4 %
NRBC BLD AUTO-RTO: 0 %
PHOSPHATE SERPL-MCNC: 6 MG/DL (ref 2.3–4.7)
PLATELET # BLD AUTO: 351 X10(3)/MCL (ref 130–400)
PMV BLD AUTO: 9.6 FL (ref 7.4–10.4)
POTASSIUM SERPL-SCNC: 5.5 MMOL/L (ref 3.5–5.1)
PROT SERPL-MCNC: 6.5 GM/DL (ref 6.4–8.3)
RBC # BLD AUTO: 3.43 X10(6)/MCL (ref 4.2–5.4)
SODIUM SERPL-SCNC: 135 MMOL/L (ref 136–145)
WBC # SPEC AUTO: 9.9 X10(3)/MCL (ref 4.5–11.5)

## 2022-09-09 PROCEDURE — 85025 COMPLETE CBC W/AUTO DIFF WBC: CPT | Performed by: STUDENT IN AN ORGANIZED HEALTH CARE EDUCATION/TRAINING PROGRAM

## 2022-09-09 PROCEDURE — 80053 COMPREHEN METABOLIC PANEL: CPT | Performed by: STUDENT IN AN ORGANIZED HEALTH CARE EDUCATION/TRAINING PROGRAM

## 2022-09-09 PROCEDURE — 84100 ASSAY OF PHOSPHORUS: CPT | Performed by: STUDENT IN AN ORGANIZED HEALTH CARE EDUCATION/TRAINING PROGRAM

## 2022-09-17 ENCOUNTER — HISTORICAL (OUTPATIENT)
Dept: ADMINISTRATIVE | Facility: HOSPITAL | Age: 57
End: 2022-09-17
Payer: COMMERCIAL

## 2022-09-22 ENCOUNTER — DOCUMENT SCAN (OUTPATIENT)
Dept: HOME HEALTH SERVICES | Facility: HOSPITAL | Age: 57
End: 2022-09-22
Payer: COMMERCIAL

## 2022-09-26 ENCOUNTER — DOCUMENT SCAN (OUTPATIENT)
Dept: HOME HEALTH SERVICES | Facility: HOSPITAL | Age: 57
End: 2022-09-26
Payer: COMMERCIAL

## 2022-09-28 ENCOUNTER — PATIENT OUTREACH (OUTPATIENT)
Dept: ADMINISTRATIVE | Facility: CLINIC | Age: 57
End: 2022-09-28
Payer: COMMERCIAL

## 2022-09-28 ENCOUNTER — TELEPHONE (OUTPATIENT)
Dept: FAMILY MEDICINE | Facility: CLINIC | Age: 57
End: 2022-09-28
Payer: COMMERCIAL

## 2022-09-28 ENCOUNTER — EXTERNAL HOSPITAL ADMISSION (OUTPATIENT)
Dept: ADMINISTRATIVE | Facility: CLINIC | Age: 57
End: 2022-09-28
Payer: COMMERCIAL

## 2022-09-28 NOTE — PROGRESS NOTES
C3 nurse attempted to contact Cleopatra Campos   for a TCC post hospital discharge follow up call. No answer at phone number listed.

## 2022-09-28 NOTE — TELEPHONE ENCOUNTER
Patient d/c from [facility] 9.24.22. Please schedule hospital f/u ([facility]) appointment for next week. [place]

## 2022-10-12 ENCOUNTER — OFFICE VISIT (OUTPATIENT)
Dept: FAMILY MEDICINE | Facility: CLINIC | Age: 57
End: 2022-10-12
Payer: COMMERCIAL

## 2022-10-12 VITALS
SYSTOLIC BLOOD PRESSURE: 133 MMHG | DIASTOLIC BLOOD PRESSURE: 70 MMHG | BODY MASS INDEX: 37.47 KG/M2 | TEMPERATURE: 98 F | OXYGEN SATURATION: 99 % | RESPIRATION RATE: 18 BRPM | HEIGHT: 60 IN | WEIGHT: 190.88 LBS

## 2022-10-12 DIAGNOSIS — E66.01 CLASS 2 SEVERE OBESITY DUE TO EXCESS CALORIES WITH SERIOUS COMORBIDITY AND BODY MASS INDEX (BMI) OF 37.0 TO 37.9 IN ADULT: Chronic | ICD-10-CM

## 2022-10-12 DIAGNOSIS — Z79.4 TYPE 2 DIABETES MELLITUS WITH HYPERGLYCEMIA, WITH LONG-TERM CURRENT USE OF INSULIN: Chronic | ICD-10-CM

## 2022-10-12 DIAGNOSIS — E11.22 TYPE 2 DIABETES MELLITUS WITH STAGE 3A CHRONIC KIDNEY DISEASE, WITH LONG-TERM CURRENT USE OF INSULIN: Chronic | ICD-10-CM

## 2022-10-12 DIAGNOSIS — E78.1 HYPERTRIGLYCERIDEMIA: Chronic | ICD-10-CM

## 2022-10-12 DIAGNOSIS — I15.2 HYPERTENSION ASSOCIATED WITH TYPE 2 DIABETES MELLITUS: Chronic | ICD-10-CM

## 2022-10-12 DIAGNOSIS — N18.31 TYPE 2 DIABETES MELLITUS WITH STAGE 3A CHRONIC KIDNEY DISEASE, WITH LONG-TERM CURRENT USE OF INSULIN: Chronic | ICD-10-CM

## 2022-10-12 DIAGNOSIS — E11.42 DIABETIC POLYNEUROPATHY ASSOCIATED WITH TYPE 2 DIABETES MELLITUS: ICD-10-CM

## 2022-10-12 DIAGNOSIS — J96.01 ACUTE RESPIRATORY FAILURE WITH HYPOXIA: ICD-10-CM

## 2022-10-12 DIAGNOSIS — E78.2 MIXED HYPERLIPIDEMIA: Chronic | ICD-10-CM

## 2022-10-12 DIAGNOSIS — E78.2 MIXED HYPERLIPIDEMIA DUE TO TYPE 2 DIABETES MELLITUS: Chronic | ICD-10-CM

## 2022-10-12 DIAGNOSIS — Z11.59 ENCOUNTER FOR HEPATITIS C SCREENING TEST FOR LOW RISK PATIENT: ICD-10-CM

## 2022-10-12 DIAGNOSIS — E03.4 ACQUIRED ATROPHY OF THYROID: Chronic | ICD-10-CM

## 2022-10-12 DIAGNOSIS — I82.412 ACUTE DEEP VEIN THROMBOSIS (DVT) OF FEMORAL VEIN OF LEFT LOWER EXTREMITY: ICD-10-CM

## 2022-10-12 DIAGNOSIS — N18.32 ANEMIA DUE TO STAGE 3B CHRONIC KIDNEY DISEASE: ICD-10-CM

## 2022-10-12 DIAGNOSIS — D63.1 ANEMIA DUE TO STAGE 3B CHRONIC KIDNEY DISEASE: ICD-10-CM

## 2022-10-12 DIAGNOSIS — I10 PRIMARY HYPERTENSION: Chronic | ICD-10-CM

## 2022-10-12 DIAGNOSIS — Z09 HOSPITAL DISCHARGE FOLLOW-UP: Primary | ICD-10-CM

## 2022-10-12 DIAGNOSIS — E11.65 TYPE 2 DIABETES MELLITUS WITH HYPERGLYCEMIA, WITH LONG-TERM CURRENT USE OF INSULIN: Chronic | ICD-10-CM

## 2022-10-12 DIAGNOSIS — Z12.31 BREAST CANCER SCREENING BY MAMMOGRAM: ICD-10-CM

## 2022-10-12 DIAGNOSIS — I82.A12 ACUTE DEEP VEIN THROMBOSIS (DVT) OF AXILLARY VEIN OF LEFT UPPER EXTREMITY: ICD-10-CM

## 2022-10-12 DIAGNOSIS — R00.1 BRADYCARDIA: ICD-10-CM

## 2022-10-12 DIAGNOSIS — E11.69 MIXED HYPERLIPIDEMIA DUE TO TYPE 2 DIABETES MELLITUS: Chronic | ICD-10-CM

## 2022-10-12 DIAGNOSIS — Z79.4 TYPE 2 DIABETES MELLITUS WITH STAGE 3A CHRONIC KIDNEY DISEASE, WITH LONG-TERM CURRENT USE OF INSULIN: Chronic | ICD-10-CM

## 2022-10-12 DIAGNOSIS — F51.01 PRIMARY INSOMNIA: ICD-10-CM

## 2022-10-12 DIAGNOSIS — E11.59 HYPERTENSION ASSOCIATED WITH TYPE 2 DIABETES MELLITUS: Chronic | ICD-10-CM

## 2022-10-12 DIAGNOSIS — Z79.4 LONG-TERM INSULIN USE: Chronic | ICD-10-CM

## 2022-10-12 PROBLEM — R39.9 SYMPTOMS INVOLVING URINARY SYSTEM: Status: RESOLVED | Noted: 2022-08-08 | Resolved: 2022-10-12

## 2022-10-12 PROBLEM — E11.9 TYPE 2 DIABETES MELLITUS: Status: ACTIVE | Noted: 2022-07-03

## 2022-10-12 PROBLEM — N18.9 ANEMIA IN CHRONIC KIDNEY DISEASE: Status: ACTIVE | Noted: 2022-08-19

## 2022-10-12 PROBLEM — G47.33 OBSTRUCTIVE SLEEP APNEA SYNDROME: Status: ACTIVE | Noted: 2022-09-15

## 2022-10-12 PROBLEM — N17.9 ACUTE KIDNEY FAILURE: Status: ACTIVE | Noted: 2022-08-19

## 2022-10-12 PROBLEM — E83.52 HYPERCALCEMIA: Status: ACTIVE | Noted: 2022-08-19

## 2022-10-12 PROBLEM — E87.5 HYPERKALEMIA: Status: ACTIVE | Noted: 2022-08-19

## 2022-10-12 PROBLEM — N17.9 ACUTE KIDNEY FAILURE: Status: RESOLVED | Noted: 2022-08-19 | Resolved: 2022-10-12

## 2022-10-12 LAB
LEFT EYE DM RETINOPATHY: NEGATIVE
RIGHT EYE DM RETINOPATHY: NORMAL

## 2022-10-12 PROCEDURE — 99215 OFFICE O/P EST HI 40 MIN: CPT | Mod: ,,, | Performed by: FAMILY MEDICINE

## 2022-10-12 PROCEDURE — 99215 PR OFFICE/OUTPT VISIT, EST, LEVL V, 40-54 MIN: ICD-10-PCS | Mod: ,,, | Performed by: FAMILY MEDICINE

## 2022-10-12 RX ORDER — VERAPAMIL HYDROCHLORIDE 240 MG/1
40 TABLET, FILM COATED, EXTENDED RELEASE ORAL 3 TIMES DAILY
Status: ON HOLD | COMMUNITY
End: 2022-10-31 | Stop reason: HOSPADM

## 2022-10-12 RX ORDER — TRAZODONE HYDROCHLORIDE 100 MG/1
100 TABLET ORAL NIGHTLY
Qty: 30 TABLET | Refills: 11 | Status: SHIPPED | OUTPATIENT
Start: 2022-10-12 | End: 2022-10-17

## 2022-10-12 NOTE — PROGRESS NOTES
Transitional Care Note    Family and/or Caretaker present at visit?  Yes.  Diagnostic tests reviewed/disposition: I have reviewed all completed as well as pending diagnostic tests at the time of discharge.  Disease/illness education: yes (multiple)  Home health/community services discussion/referrals: Patient has home health established at (uncertain provider) .   Establishment or re-establishment of referral orders for community resources:  new referral to digital medicine and DMII education (again) .   Discussion with other health care providers: No discussion with other health care providers necessary.           Subjective:      Patient ID: Cleopatra Campos is a 57 y.o. female.    Chief Complaint: Insomnia and Follow-up (Hospital follow-up Hydrocholemia )     Patient has been admitted 4 times in the past few months since her last visit, and most recently has been admitted and discharged (DC date 10/5/2022) from [facility]. Her most recent admission was for acute respiratory failure with cardiopulmonary distress (by patient description and minimal available note review, interventions by EMS and ER avoided cardiopulmonary arrest). She was intubated in the ICU for 2 days, then to the floor for a few more days before DC to home. Blood sugars were distressingly high prior to and during admission.    I am not able to review her complete record at this time, but it seems to have been a cardiac (a-fib?) precipitator to respiratory distress --> bradycardia and shock with ARF and other systemic organ failure signs that resolved with respiratory resuscitation and cardiac rate controlling medications.        Problem List Items Addressed This Visit       Acquired atrophy of thyroid (Chronic)    Relevant Orders    TSH    Primary hypertension (Chronic)    Relevant Orders    Hypertension Digital Medicine (HDMP) Enrollment Order (Completed)    Hypertension Digital Medicine (HDMP): Assign Onboarding Questionnaires (Completed)    CBC Auto  Differential    Comprehensive Metabolic Panel    Lipid Panel    Urinalysis, Reflex to Urine Culture Urine, Clean Catch    Hypertriglyceridemia (Chronic)    Relevant Orders    Comprehensive Metabolic Panel    Lipid Panel    Mixed hyperlipidemia (Chronic)    Relevant Orders    Lipids Digital Medicine (LDMP) Enrollment Order (Completed)    Lipids Digital Medicine (LDMP): Assign Onboarding Questionnaires (Completed)    Comprehensive Metabolic Panel    Lipid Panel    Class 2 severe obesity due to excess calories with serious comorbidity and body mass index (BMI) of 37.0 to 37.9 in adult (Chronic)    Type 2 diabetes mellitus with hyperglycemia (Chronic)    Relevant Orders    Diabetes Digital Medicine (DDMP) Enrollment Order (Completed)    Diabetes Digital Medicine (DDMP): Assign Onboarding Questionnaires (Completed)    Diabetic Eye Screening Photo    Comprehensive Metabolic Panel    Lipid Panel    Hemoglobin A1C    Ambulatory referral/consult to Diabetes Education    Long-term insulin use (Chronic)    Relevant Orders    Diabetic Eye Screening Photo    Type 2 diabetes mellitus with stage 3a chronic kidney disease (Chronic)    Relevant Orders    Diabetes Digital Medicine (DDMP) Enrollment Order (Completed)    Diabetes Digital Medicine (DDMP): Assign Onboarding Questionnaires (Completed)    Diabetic Eye Screening Photo    Comprehensive Metabolic Panel    Lipid Panel    Urinalysis, Reflex to Urine Culture Urine, Clean Catch    Hemoglobin A1C    Ambulatory referral/consult to Diabetes Education    Mixed hyperlipidemia due to type 2 diabetes mellitus (Chronic)    Relevant Orders    Diabetes Digital Medicine (DDMP) Enrollment Order (Completed)    Diabetes Digital Medicine (DDMP): Assign Onboarding Questionnaires (Completed)    Lipids Digital Medicine (LDMP) Enrollment Order (Completed)    Lipids Digital Medicine (LDMP): Assign Onboarding Questionnaires (Completed)    Diabetic Eye Screening Photo    Comprehensive Metabolic Panel     Lipid Panel    Hemoglobin A1C    Hypertension associated with type 2 diabetes mellitus (Chronic)    Relevant Orders    Diabetes Digital Medicine (DDMP) Enrollment Order (Completed)    Diabetes Digital Medicine (DDMP): Assign Onboarding Questionnaires (Completed)    Hypertension Digital Medicine (HDMP) Enrollment Order (Completed)    Hypertension Digital Medicine (HDMP): Assign Onboarding Questionnaires (Completed)    Diabetic Eye Screening Photo    Comprehensive Metabolic Panel    Lipid Panel    Hemoglobin A1C    Breast cancer screening by mammogram    Relevant Orders    Mammo Digital Screening Bilat w/ Leroy    Acute deep vein thrombosis (DVT) of axillary vein of left upper extremity    Relevant Orders    CBC Auto Differential    Acute deep vein thrombosis (DVT) of femoral vein of left lower extremity    Overview     Formatting of this note might be different from the original.  Added automatically from request for surgery [id number]         Relevant Orders    CBC Auto Differential    Hospital discharge follow-up - Primary    Acute respiratory failure with hypoxia    Bradycardia    Diabetic polyneuropathy associated with type 2 diabetes mellitus    Relevant Orders    Ambulatory referral/consult to Diabetes Education     Other Visit Diagnoses       Anemia due to stage 3b chronic kidney disease        Relevant Orders    CBC Auto Differential    Comprehensive Metabolic Panel    Urinalysis, Reflex to Urine Culture Urine, Clean Catch    Encounter for hepatitis C screening test for low risk patient        Relevant Orders    Hepatitis C Antibody    BMI 37.0-37.9, adult                The patient's Health Maintenance was reviewed and the following appears to be due:   Health Maintenance Due   Topic Date Due    Hepatitis C Screening  Never done    Pneumococcal Vaccines (Age 0-64) (1 - PCV) Never done    Foot Exam  Never done    Eye Exam  Never done    HIV Screening  Never done    TETANUS VACCINE  Never done    Mammogram   "Never done    Shingles Vaccine (1 of 2) Never done    COVID-19 Vaccine (2 - Mixed Product series) 2021    Influenza Vaccine (1) Never done       Past Medical History:  Past Medical History:   Diagnosis Date    Clotting disorder     Coronary artery disease     Diabetes mellitus, type 2     DVT (deep venous thrombosis)     Heart attack     Hyperlipidemia     Hypertension     Hypothyroidism     Thyroid disease      Past Surgical History:   Procedure Laterality Date    APPENDECTOMY      CARDIAC CATHETERIZATION      CARPAL TUNNEL RELEASE       SECTION      CORONARY ARTERY BYPASS GRAFT      HYSTERECTOMY      ROTATOR CUFF REPAIR Right     TRIGGER FINGER RELEASE       Review of patient's allergies indicates:   Allergen Reactions    Enoxaparin Other (See Comments)     Heparin induced thrombocytopenia     Meperidine Swelling    Meperidine hcl Swelling    Heparin      Current Outpatient Medications on File Prior to Visit   Medication Sig Dispense Refill    APIXABAN ORAL Take by mouth 2 (two) times a day.      atorvastatin (LIPITOR) 80 MG tablet Take 80 mg by mouth once daily.      BD GINA 2ND GEN PEN NEEDLE 32 gauge x 5/32" Ndle       blood-glucose meter kit Use as instructed 1 each 0    DULoxetine (CYMBALTA) 30 MG capsule Take 30 mg by mouth once daily.      fenofibrate 160 MG Tab Take 160 mg by mouth once daily.      hydrocortisone (CORTEF) 10 MG Tab Take 10 mg by mouth once daily. At 3 pm      hydrocortisone (CORTEF) 5 MG Tab Take 15 mg by mouth once daily. With breakfast.      insulin detemir U-100 (LEVEMIR FLEXTOUCH U-100 INSULN) 100 unit/mL (3 mL) InPn pen Inject 100 Units into the skin 2 (two) times daily. 60 mL 0    levothyroxine (SYNTHROID) 100 MCG tablet Take 100 mcg by mouth once daily.      liraglutide 0.6 mg/0.1 mL, 18 mg/3 mL, subq PNIJ (VICTOZA 2-DREW) 0.6 mg/0.1 mL (18 mg/3 mL) PnIj pen Inject 1.8 mg into the skin once daily.      losartan (COZAAR) 100 MG tablet Take 1 tablet (100 mg total) by " mouth once daily. 90 tablet 3    metoprolol tartrate (LOPRESSOR) 50 MG tablet Take 1 tablet (50 mg total) by mouth 2 (two) times daily. 180 tablet 3    verapamiL (CALAN-SR) 240 MG CR tablet Take 240 mg by mouth once daily.      ELIQUIS 5 mg Tab Take 1 tablet (5 mg total) by mouth 2 (two) times a day. (Patient not taking: Reported on 10/12/2022) 180 tablet 3    ondansetron (ZOFRAN) 4 MG tablet Take 4 mg by mouth every 8 (eight) hours as needed.       No current facility-administered medications on file prior to visit.     Social History     Socioeconomic History    Marital status:     Number of children: 3   Occupational History    Occupation: Retired    Tobacco Use    Smoking status: Former    Smokeless tobacco: Never    Tobacco comments:     Pt quit as of recent hospitalization for CABG/MI.    Substance and Sexual Activity    Alcohol use: Not Currently    Drug use: Never     Family History   Problem Relation Age of Onset    Diabetes Mother     Cancer Father        Review of Systems   All other systems reviewed and are negative.    Objective:   /70 (BP Location: Right arm, Patient Position: Sitting, BP Method: Large (Automatic))   Temp 97.8 °F (36.6 °C) (Temporal)   Resp 18   Ht 5' (1.524 m)   Wt 86.6 kg (190 lb 14.4 oz)   SpO2 99%   BMI 37.28 kg/m²     Physical Exam  Vitals and nursing note reviewed.   Constitutional:       General: She is awake.      Appearance: Normal appearance. She is well-developed and well-groomed. She is morbidly obese.   HENT:      Head: Normocephalic and atraumatic.      Right Ear: Tympanic membrane, ear canal and external ear normal.      Left Ear: Tympanic membrane, ear canal and external ear normal.      Nose: Nose normal.      Mouth/Throat:      Mouth: Mucous membranes are moist.      Pharynx: Oropharynx is clear.   Eyes:      Extraocular Movements: Extraocular movements intact.      Conjunctiva/sclera: Conjunctivae normal.      Pupils: Pupils are equal, round, and  reactive to light.   Cardiovascular:      Rate and Rhythm: Normal rate and regular rhythm.      Pulses:           Dorsalis pedis pulses are 1+ on the right side and 1+ on the left side.        Posterior tibial pulses are 1+ on the right side and 1+ on the left side.      Heart sounds: Normal heart sounds.   Pulmonary:      Effort: Pulmonary effort is normal.      Breath sounds: Normal breath sounds.   Abdominal:      General: Abdomen is flat. Bowel sounds are normal.      Palpations: Abdomen is soft.   Musculoskeletal:         General: Normal range of motion.      Cervical back: Normal range of motion and neck supple.      Right foot: Normal range of motion. No deformity, bunion, Charcot foot, foot drop or prominent metatarsal heads.      Left foot: Normal range of motion. No deformity, bunion, Charcot foot, foot drop or prominent metatarsal heads.   Feet:      Right foot:      Protective Sensation: 10 sites tested.  0 sites sensed.      Skin integrity: Skin integrity normal. No ulcer, blister, skin breakdown, erythema, warmth, callus, dry skin or fissure.      Toenail Condition: Right toenails are normal.      Left foot:      Protective Sensation: 10 sites tested.  0 sites sensed.      Skin integrity: Skin integrity normal. No ulcer, blister, skin breakdown, erythema, warmth, callus, dry skin or fissure.      Toenail Condition: Left toenails are normal.   Skin:     General: Skin is warm and dry.      Capillary Refill: Capillary refill takes less than 2 seconds.   Neurological:      General: No focal deficit present.      Mental Status: She is alert and oriented to person, place, and time. Mental status is at baseline.   Psychiatric:         Mood and Affect: Mood normal.         Behavior: Behavior normal. Behavior is cooperative.         Thought Content: Thought content normal.         Judgment: Judgment normal.     Protective Sensation (w/ 10 gram monofilament):  Right: Absent  Left: Absent    Visual  Inspection:  Normal -  Bilateral    Pedal Pulses:   Right: Diminished  Left: Diminished    Posterior tibialis:   Right:Diminished  Left: Diminished    Procedures     Historical on 09/17/2022   Component Date Value Ref Range Status    Blood Urea Nitrogen 08/10/2020 21 (H)  7 - 18 mg/dL Final    Creatinine 08/10/2020 0.70  0.6 - 1.3 mg/dL Final    Glucose Level 08/10/2020 252 (H)  74 - 106 mg/dL Final    HDL Cholesterol 08/10/2020 39  35 - 60 mg/dL Final    Cholesterol Total 08/10/2020 212 (H)  <200 mg/dL Final    Triglyceride 08/10/2020 499 (H)  30 - 150 mg/dL Final   Lab Requisition on 09/09/2022   Component Date Value Ref Range Status    Phosphorus Level 09/09/2022 6.0 (H)  2.3 - 4.7 mg/dL Final    Sodium Level 09/09/2022 135 (L)  136 - 145 mmol/L Final    Potassium Level 09/09/2022 5.5 (H)  3.5 - 5.1 mmol/L Final    Chloride 09/09/2022 100  98 - 107 mmol/L Final    Carbon Dioxide 09/09/2022 27  22 - 29 mmol/L Final    Glucose Level 09/09/2022 197 (H)  74 - 100 mg/dL Final    Blood Urea Nitrogen 09/09/2022 41.0 (H)  9.8 - 20.1 mg/dL Final    Creatinine 09/09/2022 3.36 (H)  0.55 - 1.02 mg/dL Final    Calcium Level Total 09/09/2022 8.9  8.4 - 10.2 mg/dL Final    Protein Total 09/09/2022 6.5  6.4 - 8.3 gm/dL Final    Albumin Level 09/09/2022 2.8 (L)  3.5 - 5.0 gm/dL Final    Globulin 09/09/2022 3.7 (H)  2.4 - 3.5 gm/dL Final    Albumin/Globulin Ratio 09/09/2022 0.8 (L)  1.1 - 2.0 ratio Final    Bilirubin Total 09/09/2022 0.4  <=1.5 mg/dL Final    Alkaline Phosphatase 09/09/2022 298 (H)  40 - 150 unit/L Final    Alanine Aminotransferase 09/09/2022 15  0 - 55 unit/L Final    Aspartate Aminotransferase 09/09/2022 14  5 - 34 unit/L Final    eGFR 09/09/2022 15  mls/min/1.73/m2 Final    WBC 09/09/2022 9.9  4.5 - 11.5 x10(3)/mcL Final    RBC 09/09/2022 3.43 (L)  4.20 - 5.40 x10(6)/mcL Final    Hgb 09/09/2022 10.0 (L)  12.0 - 16.0 gm/dL Final    Hct 09/09/2022 31.7 (L)  37.0 - 47.0 % Final    MCV 09/09/2022 92.4  80.0 - 94.0  fL Final    MCH 09/09/2022 29.2  27.0 - 31.0 pg Final    MCHC 09/09/2022 31.5 (L)  33.0 - 36.0 mg/dL Final    RDW 09/09/2022 14.6  11.5 - 17.0 % Final    Platelet 09/09/2022 351  130 - 400 x10(3)/mcL Final    MPV 09/09/2022 9.6  7.4 - 10.4 fL Final    Neut % 09/09/2022 55.4  % Final    Lymph % 09/09/2022 34.1  % Final    Mono % 09/09/2022 8.8  % Final    Eos % 09/09/2022 1.1  % Final    Basophil % 09/09/2022 0.3  % Final    Lymph # 09/09/2022 3.36  0.6 - 4.6 x10(3)/mcL Final    Neut # 09/09/2022 5.5  2.1 - 9.2 x10(3)/mcL Final    Mono # 09/09/2022 0.87  0.1 - 1.3 x10(3)/mcL Final    Eos # 09/09/2022 0.11  0 - 0.9 x10(3)/mcL Final    Baso # 09/09/2022 0.03  0 - 0.2 x10(3)/mcL Final    IG# 09/09/2022 0.03  0 - 0.04 x10(3)/mcL Final    IG% 09/09/2022 0.3  % Final    NRBC% 09/09/2022 0.0  % Final   Patient Outreach on 08/02/2022   Component Date Value Ref Range Status    Cologuard Result 01/09/2020 negative   Final-Edited       No image results found.       Assessment:     1. Hospital discharge follow-up    2. Acute respiratory failure with hypoxia    3. Bradycardia    4. Anemia due to stage 3b chronic kidney disease    5. Acute deep vein thrombosis (DVT) of axillary vein of left upper extremity    6. Acute deep vein thrombosis (DVT) of femoral vein of left lower extremity    7. Primary hypertension    8. Mixed hyperlipidemia    9. Mixed hyperlipidemia due to type 2 diabetes mellitus    10. Type 2 diabetes mellitus with hyperglycemia, with long-term current use of insulin    11. Hypertension associated with type 2 diabetes mellitus    12. Type 2 diabetes mellitus with stage 3a chronic kidney disease, with long-term current use of insulin    13. Diabetic polyneuropathy associated with type 2 diabetes mellitus    14. Long-term insulin use    15. Hypertriglyceridemia    16. Acquired atrophy of thyroid    17. Class 2 severe obesity due to excess calories with serious comorbidity and body mass index (BMI) of 37.0 to 37.9 in  adult    18. Breast cancer screening by mammogram    19. Encounter for hepatitis C screening test for low risk patient    20. BMI 37.0-37.9, adult      Plan:   I am having Cloepatra Campos maintain her LEVEMIR FLEXTOUCH U-100 INSULN, atorvastatin, DULoxetine, fenofibrate, hydrocortisone, hydrocortisone, levothyroxine, ondansetron, BD GINA 2ND GEN PEN NEEDLE, VICTOZA 2-DREW, blood-glucose meter, ELIQUIS, losartan, metoprolol tartrate, APIXABAN ORAL, and verapamiL.  Problem List Items Addressed This Visit       Acquired atrophy of thyroid (Chronic)    Relevant Orders    TSH    Primary hypertension (Chronic)    Relevant Orders    Hypertension Digital Medicine (HDMP) Enrollment Order (Completed)    Hypertension Digital Medicine (HDMP): Assign Onboarding Questionnaires (Completed)    CBC Auto Differential    Comprehensive Metabolic Panel    Lipid Panel    Urinalysis, Reflex to Urine Culture Urine, Clean Catch    Hypertriglyceridemia (Chronic)    Relevant Orders    Comprehensive Metabolic Panel    Lipid Panel    Mixed hyperlipidemia (Chronic)    Relevant Orders    Lipids Digital Medicine (LDMP) Enrollment Order (Completed)    Lipids Digital Medicine (LDMP): Assign Onboarding Questionnaires (Completed)    Comprehensive Metabolic Panel    Lipid Panel    Class 2 severe obesity due to excess calories with serious comorbidity and body mass index (BMI) of 37.0 to 37.9 in adult (Chronic)    Type 2 diabetes mellitus with hyperglycemia (Chronic)    Relevant Orders    Diabetes Digital Medicine (DDMP) Enrollment Order (Completed)    Diabetes Digital Medicine (DDMP): Assign Onboarding Questionnaires (Completed)    Diabetic Eye Screening Photo    Comprehensive Metabolic Panel    Lipid Panel    Hemoglobin A1C    Ambulatory referral/consult to Diabetes Education    Long-term insulin use (Chronic)    Relevant Orders    Diabetic Eye Screening Photo    Type 2 diabetes mellitus with stage 3a chronic kidney disease (Chronic)    Relevant Orders     Diabetes Digital Medicine (DDMP) Enrollment Order (Completed)    Diabetes Digital Medicine (DDMP): Assign Onboarding Questionnaires (Completed)    Diabetic Eye Screening Photo    Comprehensive Metabolic Panel    Lipid Panel    Urinalysis, Reflex to Urine Culture Urine, Clean Catch    Hemoglobin A1C    Ambulatory referral/consult to Diabetes Education    Mixed hyperlipidemia due to type 2 diabetes mellitus (Chronic)    Relevant Orders    Diabetes Digital Medicine (DDMP) Enrollment Order (Completed)    Diabetes Digital Medicine (DDMP): Assign Onboarding Questionnaires (Completed)    Lipids Digital Medicine (LDMP) Enrollment Order (Completed)    Lipids Digital Medicine (LDMP): Assign Onboarding Questionnaires (Completed)    Diabetic Eye Screening Photo    Comprehensive Metabolic Panel    Lipid Panel    Hemoglobin A1C    Hypertension associated with type 2 diabetes mellitus (Chronic)    Relevant Orders    Diabetes Digital Medicine (DDMP) Enrollment Order (Completed)    Diabetes Digital Medicine (DDMP): Assign Onboarding Questionnaires (Completed)    Hypertension Digital Medicine (HDMP) Enrollment Order (Completed)    Hypertension Digital Medicine (HDMP): Assign Onboarding Questionnaires (Completed)    Diabetic Eye Screening Photo    Comprehensive Metabolic Panel    Lipid Panel    Hemoglobin A1C    Breast cancer screening by mammogram    Relevant Orders    Mammo Digital Screening Bilat w/ Leroy    Acute deep vein thrombosis (DVT) of axillary vein of left upper extremity    Relevant Orders    CBC Auto Differential    Acute deep vein thrombosis (DVT) of femoral vein of left lower extremity    Relevant Orders    CBC Auto Differential    Hospital discharge follow-up - Primary    Acute respiratory failure with hypoxia    Bradycardia    Diabetic polyneuropathy associated with type 2 diabetes mellitus    Relevant Orders    Ambulatory referral/consult to Diabetes Education     Other Visit Diagnoses       Anemia due to stage 3b  chronic kidney disease        Relevant Orders    CBC Auto Differential    Comprehensive Metabolic Panel    Urinalysis, Reflex to Urine Culture Urine, Clean Catch    Encounter for hepatitis C screening test for low risk patient        Relevant Orders    Hepatitis C Antibody    BMI 37.0-37.9, adult              No follow-ups on file.    Cleopatra was seen today for insomnia and follow-up.    Diagnoses and all orders for this visit:    Hospital discharge follow-up    Acute respiratory failure with hypoxia  Bradycardia/arrhythmia   Problems related to most recent hospitalization    Anemia due to stage 3b chronic kidney disease  -     CBC Auto Differential; Future  -     Comprehensive Metabolic Panel; Future  -     Urinalysis, Reflex to Urine Culture Urine, Clean Catch  - This recent abnormal lab from recent hospitalization needs to be reassessed (see orders)    Acute deep vein thrombosis (DVT) of axillary vein of left upper extremity  -     CBC Auto Differential; Future  - This is related to a twice previous hospitalization, but never got TCC-ed  - Sees heme-oncology     Acute deep vein thrombosis (DVT) of femoral vein of left lower extremity  -     CBC Auto Differential; Future  - This is related to a twice previous hospitalization, but never got TCC-ed  - Sees heme-oncology     Primary hypertension  -     Hypertension Digital Medicine (HDMP) Enrollment Order  -     Hypertension Digital Medicine (HDMP): Assign Onboarding Questionnaires  -     CBC Auto Differential; Future  -     Comprehensive Metabolic Panel; Future  -     Lipid Panel; Future  -     Urinalysis, Reflex to Urine Culture Urine, Clean Catch   Continue current prescription medications. Refills as needed   Condition/Symptoms controlled/stable - sees multiple cardiologists currently managing this   Surveillance labs ordered as needed, or reviewed in visit.   RTC 6 months (as scheduled) or PRN      Mixed hyperlipidemia  -     Lipids Digital Medicine (LDMP)  Enrollment Order  -     Lipids Digital Medicine (LDMP): Assign Onboarding Questionnaires  -     Comprehensive Metabolic Panel; Future  -     Lipid Panel; Future  - Sees multiple cardiologists for this (and is taking a statin)    Mixed hyperlipidemia due to type 2 diabetes mellitus  -     Diabetes Digital Medicine (DDMP) Enrollment Order  -     Diabetes Digital Medicine (DDMP): Assign Onboarding Questionnaires  -     Lipids Digital Medicine (LDMP) Enrollment Order  -     Lipids Digital Medicine (LDMP): Assign Onboarding Questionnaires  -     Diabetic Eye Screening Photo; Future  -     Comprehensive Metabolic Panel; Future  -     Lipid Panel; Future  -     Hemoglobin A1C; Future  - Sees multiple cardiologists for this (and is taking a statin)    Type 2 diabetes mellitus with hyperglycemia, with long-term current use of insulin  -     Diabetes Digital Medicine (DDMP) Enrollment Order  -     Diabetes Digital Medicine (DDMP): Assign Onboarding Questionnaires  -     Diabetic Eye Screening Photo; Future  -     Comprehensive Metabolic Panel; Future  -     Lipid Panel; Future  -     Hemoglobin A1C; Future  -     Ambulatory referral/consult to Diabetes Education; Future  - A1c has improved, but patient needs much closer monitoring and attentive care (see referrals to Digital medicine and DMed)  - Surveillance labs ordered as well.     Hypertension associated with type 2 diabetes mellitus  -     Diabetes Digital Medicine (DDMP) Enrollment Order  -     Diabetes Digital Medicine (DDMP): Assign Onboarding Questionnaires  -     Hypertension Digital Medicine (HDMP) Enrollment Order  -     Hypertension Digital Medicine (HDMP): Assign Onboarding Questionnaires  -     Diabetic Eye Screening Photo; Future  -     Comprehensive Metabolic Panel; Future  -     Lipid Panel; Future  -     Hemoglobin A1C; Future  - See comments above in related Dx    Type 2 diabetes mellitus with stage 3a chronic kidney disease, with long-term current use of  insulin  -     Diabetes Digital Medicine (DDMP) Enrollment Order  -     Diabetes Digital Medicine (DD): Assign Onboarding Questionnaires  -     Diabetic Eye Screening Photo; Future  -     Comprehensive Metabolic Panel; Future  -     Lipid Panel; Future  -     Urinalysis, Reflex to Urine Culture Urine, Clean Catch  -     Hemoglobin A1C; Future  -     Ambulatory referral/consult to Diabetes Education; Future  - Sees nephrology  - See comments above in related Dx    Diabetic polyneuropathy associated with type 2 diabetes mellitus  -     Ambulatory referral/consult to Diabetes Education; Future  - See comments, orders, referrals, etc above in related Dx    Long-term insulin use  -     Diabetic Eye Screening Photo; Future    Hypertriglyceridemia  -     Comprehensive Metabolic Panel; Future  -     Lipid Panel; Future  - Surveillance labs ordered    Acquired atrophy of thyroid  -     TSH; Future  - Surveillance labs ordered    BMI 37.0-37.9, adult  Class 2 severe obesity due to excess calories with serious comorbidity and body mass index (BMI) of 37.0 to 37.9 in adult  Documented for chart completeness and HCC    Breast cancer screening by mammogram  -     Mammo Digital Screening Bilat w/ Leroy; Future    Encounter for hepatitis C screening test for low risk patient  -     Hepatitis C Antibody; Future             [unfilled]  Orders Placed This Encounter   Procedures    Mammo Digital Screening Bilat w/ Leroy     Standing Status:   Future     Standing Expiration Date:   10/12/2024     Order Specific Question:   May the Radiologist modify the order per protocol to meet the clinical needs of the patient?     Answer:   Yes     Order Specific Question:   Release to patient     Answer:   Immediate    CBC Auto Differential     Standing Status:   Future     Standing Expiration Date:   12/11/2023    Comprehensive Metabolic Panel     Standing Status:   Future     Standing Expiration Date:   12/11/2023    Lipid Panel     Standing  Status:   Future     Standing Expiration Date:   12/11/2023    Urinalysis, Reflex to Urine Culture Urine, Clean Catch     Order Specific Question:   Preferred Collection Type     Answer:   Urine, Clean Catch     Order Specific Question:   Specimen Source     Answer:   Urine    Hemoglobin A1C     Standing Status:   Future     Standing Expiration Date:   12/11/2023    TSH     Standing Status:   Future     Standing Expiration Date:   12/11/2023    Hepatitis C Antibody     Standing Status:   Future     Standing Expiration Date:   12/11/2023     Order Specific Question:   Release to patient     Answer:   Immediate    Ambulatory referral/consult to Diabetes Education     Standing Status:   Future     Standing Expiration Date:   10/12/2023     Referral Priority:   Routine     Referral Type:   Consultation     Referral Reason:   Specialty Services Required     Requested Specialty:   Diabetes     Number of Visits Requested:   1     Expiration Date:   10/12/2023    Diabetes Digital Medicine (DDMP) Enrollment Order     I. PURPOSE  To achieve optimal DM control by targeting individualized hemoglobin A1c (HgA1c) goals while preventing hypoglycemia and other adverse drug events in [facility] ([facility]) patients eligible for digital medicine management.     II. GOALS  To achieve individualized HgA1c goals and improve patient outcomes per the most recent guidelines by the American Association of Clinical Endocrinologists and American College of Endocrinology (AACE/ACE) on the comprehensive management of DM  To maintain a systematic, coordinated process to monitor and modify DM outcomes using aggregated patient data from connected devices  To provide consultative services to providers, patients and caregivers regarding optimal DM management  To provide education, guidance and reinforcement regarding DM management, including medication therapy, nutrition, and overall health and wellness   To add value to patient care by  optimizing DM management that is innovative and cost effective  To promote health maintenance for patients with DM by facilitating routine testing including HgA1c, urine micro-albumin, and diabetic eye, and foot exams.   To provide patient care and education within an interdisciplinary framework and enhance partnering with other members of health care team  To collect and utilize pharmacy related outcomes to improve quality of patient care    III. COLLABORATIVE PRACTICE AGREEMENT    A.  Under this collaborative practice agreement, an [facility] pharmacist according to and in compliance with [place] Board of Pharmacy Title 46, Part LIII, section 523 and [place] Board of Medical Examiners definition of the Collaborative Drug Therapy Management (CDTM) may initiate, implement, alter and monitor a therapeutic drug plan intended to manage DM therapy.  Services offered by the pharmacy specialist may include education on disease state and lifestyle modification, in addition to the drug therapy services listed above. Written and audio/visual educational materials and patient specific information may be provided to improve quality of care.    B. Primary Collaborating Physician:    Primary Physician: Pj Dinh MD  Title: Associate Medical Director, Primary Care, [facility]  License Number: [id number]  CDTM Number: [id number]  Telephone Number: [phone]  Email: [email or web address]  Emergency Contact Number: [phone]    Back Up Physician: Sue Mauricio MD  Title: Senior Physician  License Number: [id number]  CDTM Number:   Telephone Number: [phone]  Email: [email or web address]  Emergency Contact Number: [phone]    Back Up Physician: Carole Seo MD (Laura)  Title: Senior Physician   License Number: [id number]  CDTM Number:   Telephone Number: [phone]  Email: [email or web address]  Emergency Contact Number: [phone]    C.  Pharmacists:   Pharmacist: Little Avilez, PepeD  This pharmacist has completed a  pharmacy practice residency and has practiced clinical pharmacy for 7 years. She has experience in the areas of cardiology, acute care, and managed care. She started a pharmacist run hypertension clinic at [facility] during her residency and published an article about the clinic in The American Journal of Health-System Pharmacists.     [place] Pharmacist License Number: [id number]  CDTM number:  [id number]  Contact Number:  [phone]  Contact E-mail: [email or web address]  Emergency Contact Number:  [phone]    Pharmacist: Cherise De Los Santos PharmD  This pharmacist has completed a community ambulatory care pharmacy practice residency and has practiced clinical pharmacy for 5 years in the areas of retail, specialty, and ambulatory care.      [place] Pharmacist License Number: [id number]  CDTM Number: [id number]  Telephone number: [phone]  E-mail: [email or web address]  Emergency Contact Number: [phone]    Pharmacist: Mervat Ott PharmD  This pharmacist has completed a pharmacy practice residency with an emphasis in ambulatory care and has practiced clinical pharmacy for one year. She has experience in the areas of diabetes, hypertension and dyslipidemia.   [place] Pharmacist License Number: [id number]  CDTM Number: [id number]  Contact Number: [phone]  Contact Email: [email or web address]  Emergency Contact: [phone]    Pharmacist: Kami Lucero PharmD, BCPS  This pharmacist has completed a pharmacy practice residency with an emphasis in ambulatory care and is a Board Certified Pharmacotherapy Specialist. She has practiced clinical pharmacy for 5 year in areas of ambulatory care, internal medicine, cardiology and specialty pharmacy.    [place] Pharmacist License Number: [id number]  CDTM number:  [id number]  Contact Number:  [phone]  Contact E-mail:  [email or web address]   Emergency Contact Number:  [phone]    SANTIAGO Martino  Patients  Patients whose DM is managed under this agreement must have a diagnosis of Type 2 DM as documented in the patient record, and have established care with a provider within [facility]. All aspects of the patients medication management will be followed in collaboration with the physician treating the patients condition.  The patient will be seen by his/her physician per his/her discretion or by the recommendation by the [facility] medicine clinical pharmacy specialist.  The patients case may be reviewed with medical personnel as needed. Outcomes including HgA1c will be reported at least every 120 days to the collaborating physicians.    E.  Medications in CDTM  This CDTM agreement involves the management of pharmacotherapy for diabetes, specifically, biguanides, sulfonylureas (SFUs), meglitinite derivatives, alpha-glucosidase inhibitors, thiazolidinediones (TZDs), glucagonlike peptide-1 (GLP-1) agonists, dipeptidyl peptidase 4 (DPPP-4) inhibitors, selective sodium-glucose cotransporter-2 (SGLT-2) inhibitors, insulins, amylionomimetics, bile acid sequestrants (BAS), dopamine receptor agonists, or those patients in which DM is controlled by lifestyle therapy alone.      This CDTM agreement also involves management of pharmacotherapy to minimize or prevent complications of diabetes, including statins and RAAS blocking agents.     F.  Clinical Procedure  All patients will be notified that a DM CDTM exists.  A signed physician order will be required for each patient to be enrolled into the DDMP.  Informed consent and an electronic signature will be obtained from each patient and documented in the patients record.  This patient signature will be valid for 1 year, requiring a new physician order and patient consent yearly.  The patient must also be enrolled in electronic communications via [facility]. The following management plan will be utilized:    Review initial therapy and establish entry  HgA1c  Individualize HgA1c goal:  Less than or equal to 7% for patients without concurrent serious illness and at low hypoglycemic risk  Less than or equal to 8% for patients with concurrent serious illness and at risk for hypoglycemia    Obtain HgA1c measurements as outlined by enrolling physician, up to 4 times per year  Guide drug therapy optimization based on the AACE/ACE glycemic control algorithm in order to achieve HgA1c goals  Ensure patient is on appropriate statin and RAAS blockade therapy  Order appropriate blood tests as needed to ensure medication safety  Document medication changes, interventions and outcomes in electronic medical record (EMR)  Notify responsible provider of specific problems/concerns as necessary. Refer to physician when appropriate.  Provide patient/caregiver education and continuous reinforcement regarding DM management, medication therapy and lifestyle modifications.   Consult endocrinology as needed for cases when therapeutic goals are not met despite DM therapy optimization    Pharmacists will be authorized to order and evaluate laboratory tests directly related to the disease specific drug therapy being managed. Pharmacists will also be authorized to order additional specific labs based on patient clinical presentation or description. Pharmacists may also review other lab data available in the patient record which may be necessary for the evaluation and assessment of the impact on antihypertensive therapy (i.e. drug interaction, disease interaction, etc.)   Documentation of patient encounters and lab results will be permanently placed in the [facility].  Laboratory results will automatically populate prompting review and assessment of each patient.  Laboratory results obtained from outside laboratories will be entered into EMR manually.  This documentation will include lab values, medication changes, identification and assessment of adverse events related to therapy, medication  dose adjustments, therapeutic management plan and follow up as well as any other information given to the patient during the digital medicine encounter.     G.      will be carried out through quarterly reports tracking HgA1c changes, incidence of hypoglycemic events and compliance with health maintenance including urine micro-albumin screening and diabetic eye exams.      Hypertension Digital Medicine (HDMP) Enrollment Order     I. PURPOSE  To provide [facility] patients with innovative, specialized blood pressure monitoring and optimal dosing of antihypertensive therapy to improve health outcomes and decrease microvascular and macrovascular complications    II. GOALS  To maintain a systematic, coordinated and cost-effective process to monitor blood pressure in patients with hypertension  To attain and maintain optimal antihypertensive therapy while ensuring patient safety using the most recent evidence-based guidelines for the management of hypertension as reported by AHA/ACC in 2017.   Provide consultative services to providers, patients and caregivers regarding optimal antihypertensive therapy  To improve patient/caregiver understanding and compliance related to antihypertensive therapies by providing continuous patient and caregiver education about their prescribed medications and associated disease state  To provide education, guidance and reinforcement regarding lifestyle modifications including weight loss, adopting and maintaining the Dietary Approaches to Stop Hypertension or DASH diet, sodium restriction, physical activity, and moderation of alcohol consumption to patients and caregivers  Allow providers increased availability of clinic time for direct patient care   To provide patient care and education within an interdisciplinary framework and enhance partnering with other members of health care team  Improve continuity of care for high blood pressure  patients and improve patient engagement  Collect and utilize pharmacy related outcomes to improve quality of patient care    III. COLLABORATIVE PRACTICE AGREEMENT    A.  Under this collaborative practice agreement, an [facility] pharmacist according to and in compliance with [place] Board of Pharmacy Title 46, Part LIII, section 523 and [place] Board of Medical Examiners definition of the Collaborative Drug Therapy Management (CDTM) may initiate, implement, alter and monitor a therapeutic drug plan intended to manage antihypertensive therapy.  Services offered by the hypertension pharmacy specialist may include education on disease state and lifestyle modification, in addition to the drug therapy services listed above. Written and audio/visual educational materials and patient specific information may be provided to improve quality of care.    B. Primary Collaborating Physician:    Primary Physician: Steven Watson M.D., [facility]  , Cardiology  License number: [id number]  CDTM number:  [id number]  Telephone number: [phone]   E-mail address: [email or web address]  Emergency Contact Information: [phone]    Back-Up Physician:  Nabil Nation M.D.  Cardiology  License number:  [id number]  CDTM number:  [id number]  Telephone number:  [phone]  E-mail address: [email or web address]  Emergency Contact Information: [phone]    C.  Pharmacists:   Each of the following pharmacists will serve as the primary pharmacist on CDTM and any pharmacist may serve as the secondary pharmacist for another pharmacists agreement.  Each of the pharmacists listed below has a Pharm.D degree, with completion of an accredited pharmacy practice residency and as well as experience with managing patients along with a physician.  The outpatient monitoring service will be provided under the Cardiology Department at [facility] location in [place] at [address] [place] [address]  [id number]. The pharmacist will contact patients via telephone from a remote location.    Pharmacist: Little Avilez, Yogi  This pharmacist has completed a pharmacy practice residency and has practiced clinical pharmacy for 7 years. She has experience in the areas of cardiology, acute care, and managed care. She started a pharmacist run hypertension clinic at [facility] during her residency and was published in The American Journal of Health-System Pharmacists.     [place] Pharmacist License Number: [id number]  CDTM number:  [id number]  Contact Number:  [phone]  Contact E-mail: [email or web address]  Emergency Contact Number:  [phone]    Pharmacist:  Dorota Pope PharmD  This pharmacist has completed a pharmacy practice residency and has practiced clinical pharmacy for 17 years in the areas of cardiology, geriatric medicine, anticoagulation management, retail and ambulatory care.  She served as a pharmacy practice clinical preceptor and lead pharmacist in anticoagulation clinic for 10 years.  [place] Pharmacist License # [id number]  CDTM number:  [id number]  Contact Number:  [phone]  Contact E-mail:  [email or web address]   Emergency Contact Number:  [phone]    Pharmacist: Barbara Neumann PharmD, BCACP, CDE  This pharmacist has completed a pharmacy practice residency with emphasis in ambulatory care and has practiced clinical pharmacy for 8 years in the areas of dyslipidemia, hypertension, diabetes, and anticoagulation. She is also a Certified Diabetes Educator.      [place] Pharmacist License # [id number]  CDTM number: [id number]  Contact number: [phone]  Contact E-mail:  [email or web address]  Emergency Contact Number: [phone]    Pharmacist: Cherise Vincent PharmD  This pharmacist started practicing at [facility] in 2011 following her one year residency at [facility]. She serves as an Adjunct Clinical  in the College of Pharmacy at  [employer]. She served as the lead pharmacist for the Coumadin Clinic team for 4 years.   [place] Pharmacist License: [id number]  CDTM number: [id number]  Contact number: [phone]  Contact E-mail: [email or web address]   Emergency Contact Number: [phone]    Pharmacist:  Lois Riuz PharmD  This pharmacist has completed a pharmacy practice residency (PGY-1) and has practiced clinical pharmacy for 16  years in the areas of heart and abdominal transplant, retail and ambulatory care.  She was directly involved in the care of congestive heart failure, left ventricular assist device and heart transplant patients from [id number].  She is currently practicing as a digital medicine clinical specialist in heart failure.    [place] Pharmacist License # [id number]  CDTM number:  [id number]  Contact Number:  [phone]  Contact E-mail:  [email or web address]   Emergency Contact Number:  [phone]    Pharmacist: Cherise De Los Santos PharmD  This pharmacist obtained her Pharm.D. from [employer], and has completed an Ambulatory Care Pharmacy Practice residency at [facility]. She has practiced clinical pharmacy for 9  years and has experience in the areas of Hypertension and Diabetes.      [place] Pharmacist License: [id number]  CDTM Number: [id number]  Telephone number: [phone]  E-mail: [email or web address]  Emergency Contact Number: [phone]      Pharmacist: Mervat Ott PharmD  This pharmacist has completed a pharmacy practice residency with an emphasis in ambulatory care and has practiced clinical pharmacy for one year. She has experience in the areas of diabetes, hypertension and dyslipidemia.   [place] Pharmacist License: [id number]  CDTM Number: [id number]  Contact Number: [phone]  Contact Email: [email or web address]   Emergency Contact: [phone]    Pharmacist: Kami Lucero, PharmD, BCPS  This pharmacist has  completed a pharmacy practice residency with an emphasis in ambulatory care and is a Board Certified Pharmacotherapy Specialist (BCPS). She has practiced clinical pharmacy for  years in areas of ambulatory care, internal medicine, cardiology and specialty pharmacy.    [place] Pharmacist License Number: [id number]  CDTM number:  [id number]  Contact Number:  [phone]  Contact E-mail:  [email or web address]   Emergency Contact Number:  [phone] d.  Eligible Patients  Patients whose antihypertensive therapy is managed under this agreement must have a diagnosis of hypertension as documented in the patient record, and have established care with a provider within [facility]. All aspects of the patients hypertension medication management will be followed in collaboration with the physician treating the patients hypertension.  The patient will be seen by his or her physician per their discretion or by the recommendation by the hypertension pharmacist.  The patients case may be reviewed with clinic medical personnel as needed, but will be reported at least every 30 days to the collaborating physician regarding the patients drug therapy management. All decisions made by the pharmacist will be recorded in [facility] and are readily available for physician review. All issues outside of the scope of antihypertensive therapy shall be reported to the collaborating physician.     E.  Medications in CDTM  This collaborative practice proposal involves the management of patients who are receiving antihypertensive therapy, specifically, thiazide-type diuretics, angiotensin-converting enzyme inhibtors (ACE-I), angiotensin receptor blockers (ARB), calcium channel blockers (CCB), beta-blockers, loop diuretics, potassium-sparing diuretics, potassium supplementation, aldosterone antagonists, direct renin inhibitors, alpha-1 receptor blockers, central alpha-2 agonists, direct arterial vasodilators and  peripheral adrenergic antoagonists, or those patients in which hypertension is controlled by lifestyle modifcation alone.      F.  Clinical Procedure  All patients will be notified that a hypertension CDTM exists.  A signed physician order will be required for each patient to be enrolled into the hypertension CDTM.  Informed consent and an electronic signature will be obtained from each patient and documented in the patients record.  This patient signature will be valid for 1 year, requiring a new physician order and patient consent yearly.  The patient must also be enrolled in the [employer] communication program ([facility]) to be elegible for the monitoring program.  The following management plan will be utilized for CDTM:    Patients must submit blood pressures at a minimum of once weekly from the patient- purchased wireless blood pressure cuff. Patients who fail to comply with this requirement are subject to removal from [facility].   Evaluate the change in blood pressure, if any, from previous measurements performed on the same cuff and arm.  Determine and document contributing factors for blood pressure change.    Review initial drug therapy made by clinic physician upon program enrollment with patient to ensure compliance.    Identify target blood pressure based on age and comorbidities. Therapeutic changes will be made in collaboration with PharmD and collaborating physician based on the AHA/ACC 2017 guidelines for the treatment of hypertension.  Guide drug optimization based on patient response at 2-4 week intervals until control is achieved.  The PharmD will continue to monitor blood pressure and make adjustments to hypertension medications in order to achieve optimal blood pressure.   Order follow up labs when changing or adding ACE inhibitors and diuretics.    Refer to hypertension specialist if  blood pressure goals cannot be achieved using the noted algorithm.  Notify physician with specific problems or  request clinic visit if deemed necessary.  Document antihypertensive therapy changes, interventions, and outcomes in EMR.   Provide patient/caregiver continuous education or reinforcement regarding hypertension management,  medications and lifestyle modifications.    Laboratory Tests  Pharmacists will be authorized to order and evaluate laboratory tests directly related to the disease specific drug therapy being managed. Pharmacists will also be authorized to order additional specific labs based on patient clinical presentation or description. Pharmacists may also review other lab data available in the patient record which may be necessary for the evaluation and assessment of the impact on antihypertensive therapy (i.e. drug interaction, disease interaction, etc.).     b.  Documentation   Documentation of patient encounters and lab results will be permanently placed in the [facility].  Laboratory results will automatically populate prompting review and assessment of each patient.  Laboratory results obtained from outside laboratories will be entered into EMR manually.  This documentation will include lab values, medication changes, identification and assessment of adverse events related to therapy, antihypertensive medication dose adjustments, therapeutic management plan and follow up as well as any other information given to the patient during the telemedicine visit.    c.     1.   will be carried out through quarterly reports tracking following statistics:   a. Percent of patients requiring a change to antihypertensive medications   b. Number of emergency visits due to hypertensive urgency or emergency, hypotension or medication side effects for participating patients  Percent of patients who are controlled (BP <130/80 mmHg)  and uncontrolled (BP>130 mmHg)     2.  Patient specific quality indicators will be identified through quarterly review of the following data:   a.  Average  "change in blood pressure after a dose adjument by the hypertension pharmacist    3.  A random sample of patient records shall be reviewed by the primary physician quarterly to ensure adherence by the hypertension clinical specialists to the collaborative practice agreement.     4.   measures will be reported to Pharmacy & Therapeutics Committee yearly.     References:    MARIA Grant, et al. 2017. 2017. Guidelines for the Prevention, Detection, Evaluation and Management of High Blood Pressure in Adults.      Order Specific Question:   BP Control Goal     Answer:   Current (2017) AHA Guidelines    Lipids Digital Medicine (LDMP) Enrollment Order    Diabetic Eye Screening Photo     Standing Status:   Future     Standing Expiration Date:   10/12/2023     Order Specific Question:   Release to patient     Answer:   Immediate       Medication List with Changes/Refills   Current Medications    APIXABAN ORAL    Take by mouth 2 (two) times a day.    ATORVASTATIN (LIPITOR) 80 MG TABLET    Take 80 mg by mouth once daily.    BD GINA 2ND GEN PEN NEEDLE 32 GAUGE X 5/32" NDLE        BLOOD-GLUCOSE METER KIT    Use as instructed    DULOXETINE (CYMBALTA) 30 MG CAPSULE    Take 30 mg by mouth once daily.    ELIQUIS 5 MG TAB    Take 1 tablet (5 mg total) by mouth 2 (two) times a day.    FENOFIBRATE 160 MG TAB    Take 160 mg by mouth once daily.    HYDROCORTISONE (CORTEF) 10 MG TAB    Take 10 mg by mouth once daily. At 3 pm    HYDROCORTISONE (CORTEF) 5 MG TAB    Take 15 mg by mouth once daily. With breakfast.    INSULIN DETEMIR U-100 (LEVEMIR FLEXTOUCH U-100 INSULN) 100 UNIT/ML (3 ML) INPN PEN    Inject 100 Units into the skin 2 (two) times daily.    LEVOTHYROXINE (SYNTHROID) 100 MCG TABLET    Take 100 mcg by mouth once daily.    LIRAGLUTIDE 0.6 MG/0.1 ML, 18 MG/3 ML, SUBQ PNIJ (VICTOZA 2-DREW) 0.6 MG/0.1 ML (18 MG/3 ML) PNIJ PEN    Inject 1.8 mg into the skin once daily.    LOSARTAN (COZAAR) 100 MG TABLET    Take 1 tablet " "(100 mg total) by mouth once daily.    METOPROLOL TARTRATE (LOPRESSOR) 50 MG TABLET    Take 1 tablet (50 mg total) by mouth 2 (two) times daily.    ONDANSETRON (ZOFRAN) 4 MG TABLET    Take 4 mg by mouth every 8 (eight) hours as needed.    VERAPAMIL (CALAN-SR) 240 MG CR TABLET    Take 240 mg by mouth once daily.      Medication List with Changes/Refills   Current Medications    APIXABAN ORAL    Take by mouth 2 (two) times a day.       Start Date: --        End Date: --    ATORVASTATIN (LIPITOR) 80 MG TABLET    Take 80 mg by mouth once daily.       Start Date: 2/18/2022 End Date: --    BD GINA 2ND GEN PEN NEEDLE 32 GAUGE X 5/32" NDLE           Start Date: 6/9/2022  End Date: --    BLOOD-GLUCOSE METER KIT    Use as instructed       Start Date: 8/8/2022  End Date: 8/8/2023    DULOXETINE (CYMBALTA) 30 MG CAPSULE    Take 30 mg by mouth once daily.       Start Date: 1/28/2022 End Date: --    ELIQUIS 5 MG TAB    Take 1 tablet (5 mg total) by mouth 2 (two) times a day.       Start Date: 8/30/2022 End Date: --    FENOFIBRATE 160 MG TAB    Take 160 mg by mouth once daily.       Start Date: 9/13/2021 End Date: --    HYDROCORTISONE (CORTEF) 10 MG TAB    Take 10 mg by mouth once daily. At 3 pm       Start Date: 7/27/2022 End Date: --    HYDROCORTISONE (CORTEF) 5 MG TAB    Take 15 mg by mouth once daily. With breakfast.       Start Date: 7/28/2022 End Date: --    INSULIN DETEMIR U-100 (LEVEMIR FLEXTOUCH U-100 INSULN) 100 UNIT/ML (3 ML) INPN PEN    Inject 100 Units into the skin 2 (two) times daily.       Start Date: 6/8/2022  End Date: 8/8/2023    LEVOTHYROXINE (SYNTHROID) 100 MCG TABLET    Take 100 mcg by mouth once daily.       Start Date: 11/22/2021End Date: --    LIRAGLUTIDE 0.6 MG/0.1 ML, 18 MG/3 ML, SUBQ PNIJ (VICTOZA 2-DREW) 0.6 MG/0.1 ML (18 MG/3 ML) PNIJ PEN    Inject 1.8 mg into the skin once daily.       Start Date: 11/30/2021End Date: --    LOSARTAN (COZAAR) 100 MG TABLET    Take 1 tablet (100 mg total) by mouth once " daily.       Start Date: 8/30/2022 End Date: 8/30/2023    METOPROLOL TARTRATE (LOPRESSOR) 50 MG TABLET    Take 1 tablet (50 mg total) by mouth 2 (two) times daily.       Start Date: 8/30/2022 End Date: 8/30/2023    ONDANSETRON (ZOFRAN) 4 MG TABLET    Take 4 mg by mouth every 8 (eight) hours as needed.       Start Date: 8/5/2022  End Date: --    VERAPAMIL (CALAN-SR) 240 MG CR TABLET    Take 240 mg by mouth once daily.       Start Date: --        End Date: --            Separately identifiable service    TCC only pertains to first 5 Dx above    HPI: along with TCC, chronic condition surveillance and management visit for DMII, HLD, HTN, mammogram need, etc  ROS: related to these conditions, patient has had labile blood sugars and BP, but has not been able to get back for regularly scheduled visits due to frequent hospitalizations  PE: see PE above - no acute exam abnormalities noted, foot exam performed  A&P:  Primary hypertension  -     Hypertension Digital Medicine (HDMP) Enrollment Order  -     Hypertension Digital Medicine (HDMP): Assign Onboarding Questionnaires  -     CBC Auto Differential; Future  -     Comprehensive Metabolic Panel; Future  -     Lipid Panel; Future  -     Urinalysis, Reflex to Urine Culture Urine, Clean Catch   Continue current prescription medications. Refills as needed   Condition/Symptoms controlled/stable - sees multiple cardiologists currently managing this   Surveillance labs ordered as needed, or reviewed in visit.   RTC 6 months (as scheduled) or PRN      Mixed hyperlipidemia  -     Lipids Digital Medicine (LDMP) Enrollment Order  -     Lipids Digital Medicine (LDMP): Assign Onboarding Questionnaires  -     Comprehensive Metabolic Panel; Future  -     Lipid Panel; Future  - Sees multiple cardiologists for this (and is taking a statin)    Mixed hyperlipidemia due to type 2 diabetes mellitus  -     Diabetes Digital Medicine (DDMP) Enrollment Order  -     Diabetes Digital Medicine (DDMP):  Assign Onboarding Questionnaires  -     Lipids Digital Medicine (LDMP) Enrollment Order  -     Lipids Digital Medicine (LDMP): Assign Onboarding Questionnaires  -     Diabetic Eye Screening Photo; Future  -     Comprehensive Metabolic Panel; Future  -     Lipid Panel; Future  -     Hemoglobin A1C; Future  - Sees multiple cardiologists for this (and is taking a statin)    Type 2 diabetes mellitus with hyperglycemia, with long-term current use of insulin  -     Diabetes Digital Medicine (DDMP) Enrollment Order  -     Diabetes Digital Medicine (DDMP): Assign Onboarding Questionnaires  -     Diabetic Eye Screening Photo; Future  -     Comprehensive Metabolic Panel; Future  -     Lipid Panel; Future  -     Hemoglobin A1C; Future  -     Ambulatory referral/consult to Diabetes Education; Future  - A1c has improved, but patient needs much closer monitoring and attentive care (see referrals to Digital medicine and DMed)  - Surveillance labs ordered as well.     Hypertension associated with type 2 diabetes mellitus  -     Diabetes Digital Medicine (DDMP) Enrollment Order  -     Diabetes Digital Medicine (DDMP): Assign Onboarding Questionnaires  -     Hypertension Digital Medicine (HDMP) Enrollment Order  -     Hypertension Digital Medicine (HDMP): Assign Onboarding Questionnaires  -     Diabetic Eye Screening Photo; Future  -     Comprehensive Metabolic Panel; Future  -     Lipid Panel; Future  -     Hemoglobin A1C; Future  - See comments above in related Dx    Type 2 diabetes mellitus with stage 3a chronic kidney disease, with long-term current use of insulin  -     Diabetes Digital Medicine (DDMP) Enrollment Order  -     Diabetes Digital Medicine (DDMP): Assign Onboarding Questionnaires  -     Diabetic Eye Screening Photo; Future  -     Comprehensive Metabolic Panel; Future  -     Lipid Panel; Future  -     Urinalysis, Reflex to Urine Culture Urine, Clean Catch  -     Hemoglobin A1C; Future  -     Ambulatory  referral/consult to Diabetes Education; Future  - Sees nephrology  - See comments above in related Dx    Diabetic polyneuropathy associated with type 2 diabetes mellitus  -     Ambulatory referral/consult to Diabetes Education; Future  - See comments, orders, referrals, etc above in related Dx    Long-term insulin use  -     Diabetic Eye Screening Photo; Future    Hypertriglyceridemia  -     Comprehensive Metabolic Panel; Future  -     Lipid Panel; Future  - Surveillance labs ordered    Acquired atrophy of thyroid  -     TSH; Future  - Surveillance labs ordered    BMI 37.0-37.9, adult  Class 2 severe obesity due to excess calories with serious comorbidity and body mass index (BMI) of 37.0 to 37.9 in adult  Documented for chart completeness and HCC    Breast cancer screening by mammogram  -     Mammo Digital Screening Bilat w/ Leroy; Future    Encounter for hepatitis C screening test for low risk patient  -     Hepatitis C Antibody; Future    Primary Insomnia   Start trazodone

## 2022-10-17 ENCOUNTER — TELEPHONE (OUTPATIENT)
Dept: FAMILY MEDICINE | Facility: CLINIC | Age: 57
End: 2022-10-17
Payer: COMMERCIAL

## 2022-10-17 DIAGNOSIS — F51.01 PRIMARY INSOMNIA: Primary | ICD-10-CM

## 2022-10-17 RX ORDER — DOXEPIN HYDROCHLORIDE 25 MG/1
25 CAPSULE ORAL NIGHTLY
Qty: 30 CAPSULE | Refills: 11 | Status: SHIPPED | OUTPATIENT
Start: 2022-10-17 | End: 2023-01-23

## 2022-10-17 NOTE — TELEPHONE ENCOUNTER
I have signed for the following orders AND/OR meds.  Please call the patient and ask the patient to schedule the testing AND/OR inform about any medications that were sent.      No orders of the defined types were placed in this encounter.      Medications Ordered This Encounter   Medications    doxepin (SINEQUAN) 25 MG capsule     Sig: Take 1 capsule (25 mg total) by mouth every evening.     Dispense:  30 capsule     Refill:  11

## 2022-10-28 ENCOUNTER — HOSPITAL ENCOUNTER (INPATIENT)
Facility: HOSPITAL | Age: 57
LOS: 3 days | Discharge: HOME | DRG: 871 | End: 2022-10-31
Attending: STUDENT IN AN ORGANIZED HEALTH CARE EDUCATION/TRAINING PROGRAM | Admitting: HOSPITALIST
Payer: COMMERCIAL

## 2022-10-28 DIAGNOSIS — K52.9 GASTROENTERITIS: ICD-10-CM

## 2022-10-28 DIAGNOSIS — E87.1 HYPONATREMIA: ICD-10-CM

## 2022-10-28 DIAGNOSIS — J90 PLEURAL EFFUSION: ICD-10-CM

## 2022-10-28 DIAGNOSIS — J18.9 PNEUMONIA OF LEFT LOWER LOBE DUE TO INFECTIOUS ORGANISM: Primary | ICD-10-CM

## 2022-10-28 DIAGNOSIS — K57.92 DIVERTICULITIS: ICD-10-CM

## 2022-10-28 DIAGNOSIS — R73.9 HYPERGLYCEMIA: ICD-10-CM

## 2022-10-28 DIAGNOSIS — R53.1 WEAKNESS: ICD-10-CM

## 2022-10-28 DIAGNOSIS — K52.9 COLITIS: ICD-10-CM

## 2022-10-28 LAB
ALBUMIN SERPL-MCNC: 2.9 GM/DL (ref 3.5–5)
ALBUMIN/GLOB SERPL: 0.8 RATIO (ref 1.1–2)
ALP SERPL-CCNC: 172 UNIT/L (ref 40–150)
ALT SERPL-CCNC: 11 UNIT/L (ref 0–55)
APPEARANCE UR: ABNORMAL
APTT PPP: 44.1 SECONDS (ref 23.2–33.7)
AST SERPL-CCNC: 15 UNIT/L (ref 5–34)
B-OH-BUTYR SERPL-MCNC: 1.9 MMOL/L
BACTERIA #/AREA URNS AUTO: ABNORMAL /HPF
BASE EXCESS ARTERIAL: -7.5 MMOL/L (ref -2–2)
BASOPHILS # BLD AUTO: 0.04 X10(3)/MCL (ref 0–0.2)
BASOPHILS NFR BLD AUTO: 0.2 %
BILIRUB UR QL STRIP.AUTO: NEGATIVE MG/DL
BILIRUBIN DIRECT+TOT PNL SERPL-MCNC: 1 MG/DL
BUN SERPL-MCNC: 26.9 MG/DL (ref 9.8–20.1)
CALCIUM SERPL-MCNC: 9.7 MG/DL (ref 8.4–10.2)
CHLORIDE SERPL-SCNC: 94 MMOL/L (ref 98–107)
CO2 SERPL-SCNC: 17 MMOL/L (ref 22–29)
COLOR UR AUTO: YELLOW
CREAT SERPL-MCNC: 3.32 MG/DL (ref 0.55–1.02)
EOSINOPHIL # BLD AUTO: 0.05 X10(3)/MCL (ref 0–0.9)
EOSINOPHIL NFR BLD AUTO: 0.3 %
ERYTHROCYTE [DISTWIDTH] IN BLOOD BY AUTOMATED COUNT: 14.6 % (ref 11.5–17)
FLUAV AG UPPER RESP QL IA.RAPID: NOT DETECTED
FLUBV AG UPPER RESP QL IA.RAPID: NOT DETECTED
GFR SERPLBLD CREATININE-BSD FMLA CKD-EPI: 15 MLS/MIN/1.73/M2
GLOBULIN SER-MCNC: 3.8 GM/DL (ref 2.4–3.5)
GLUCOSE SERPL-MCNC: 178 MG/DL (ref 74–100)
GLUCOSE SERPL-MCNC: 193 MG/DL (ref 70–110)
GLUCOSE UR QL STRIP.AUTO: NEGATIVE MG/DL
HCO3 ARTERIAL: 16.6 MMOL/L (ref 18–23)
HCT VFR BLD AUTO: 31.6 % (ref 37–47)
HGB BLD-MCNC: 10.4 GM/DL (ref 12–16)
HGB BLD-MCNC: 9.3 G/DL
IMM GRANULOCYTES # BLD AUTO: 0.1 X10(3)/MCL (ref 0–0.04)
IMM GRANULOCYTES NFR BLD AUTO: 0.6 %
KETONES UR QL STRIP.AUTO: NEGATIVE MG/DL
LACTATE SERPL-SCNC: 0.8 MMOL/L (ref 0.5–2.2)
LEUKOCYTE ESTERASE UR QL STRIP.AUTO: ABNORMAL UNIT/L
LIPASE SERPL-CCNC: 14 U/L
LYMPHOCYTES # BLD AUTO: 3.47 X10(3)/MCL (ref 0.6–4.6)
LYMPHOCYTES NFR BLD AUTO: 20.3 %
MAGNESIUM SERPL-MCNC: 1.8 MG/DL (ref 1.6–2.6)
MCH RBC QN AUTO: 29.1 PG (ref 27–31)
MCHC RBC AUTO-ENTMCNC: 32.9 MG/DL (ref 33–36)
MCV RBC AUTO: 88.5 FL (ref 80–94)
MONOCYTES # BLD AUTO: 1.59 X10(3)/MCL (ref 0.1–1.3)
MONOCYTES NFR BLD AUTO: 9.3 %
NEUTROPHILS # BLD AUTO: 11.8 X10(3)/MCL (ref 2.1–9.2)
NEUTROPHILS NFR BLD AUTO: 69.3 %
NITRITE UR QL STRIP.AUTO: NEGATIVE
NRBC BLD AUTO-RTO: 0.2 %
PCO2 BLDA: 28 MM[HG]
PCO2 BLDA: ABNORMAL MM[HG]
PH SMN: 7.38 [PH]
PH UR STRIP.AUTO: 5 [PH]
PLATELET # BLD AUTO: 488 X10(3)/MCL (ref 130–400)
PMV BLD AUTO: 9.5 FL (ref 7.4–10.4)
PO2 BLDA: 102 MM[HG]
POC COHB: 2.2
POC FIO2: ABNORMAL
POC IONIZED CALCIUM: 1.15
POC METHB: 0.8
POC O2HB: 96.5
POCT GLUCOSE: 180 MG/DL (ref 70–110)
POCT GLUCOSE: 193 MG/DL (ref 70–110)
POTASSIUM BLD-SCNC: 4.7 MMOL/L
POTASSIUM SERPL-SCNC: 5.6 MMOL/L (ref 3.5–5.1)
PROT SERPL-MCNC: 6.7 GM/DL (ref 6.4–8.3)
PROT UR QL STRIP.AUTO: ABNORMAL MG/DL
RBC # BLD AUTO: 3.57 X10(6)/MCL (ref 4.2–5.4)
RBC #/AREA URNS AUTO: ABNORMAL /HPF
RBC UR QL AUTO: NEGATIVE UNIT/L
SARS-COV-2 RNA RESP QL NAA+PROBE: NOT DETECTED
SATURATED O2 ARTERIAL, I-STAT: 97.8
SODIUM BLD-SCNC: 125 MMOL/L
SODIUM SERPL-SCNC: 128 MMOL/L (ref 136–145)
SP GR UR STRIP.AUTO: 1.01 (ref 1–1.03)
SQUAMOUS #/AREA URNS AUTO: ABNORMAL /HPF
TROPONIN I SERPL-MCNC: 0.01 NG/ML (ref 0–0.04)
URATE CRY URNS QL MICRO: ABNORMAL /HPF
UROBILINOGEN UR STRIP-ACNC: 0.2 MG/DL
WBC # SPEC AUTO: 17.1 X10(3)/MCL (ref 4.5–11.5)
WBC #/AREA URNS AUTO: ABNORMAL /HPF

## 2022-10-28 PROCEDURE — 0241U COVID/FLU A&B PCR: CPT | Performed by: STUDENT IN AN ORGANIZED HEALTH CARE EDUCATION/TRAINING PROGRAM

## 2022-10-28 PROCEDURE — 96365 THER/PROPH/DIAG IV INF INIT: CPT

## 2022-10-28 PROCEDURE — 96361 HYDRATE IV INFUSION ADD-ON: CPT

## 2022-10-28 PROCEDURE — 63600175 PHARM REV CODE 636 W HCPCS: Performed by: HOSPITALIST

## 2022-10-28 PROCEDURE — 93010 EKG 12-LEAD: ICD-10-PCS | Mod: ,,, | Performed by: INTERNAL MEDICINE

## 2022-10-28 PROCEDURE — 81003 URINALYSIS AUTO W/O SCOPE: CPT | Performed by: STUDENT IN AN ORGANIZED HEALTH CARE EDUCATION/TRAINING PROGRAM

## 2022-10-28 PROCEDURE — 82010 KETONE BODYS QUAN: CPT | Performed by: STUDENT IN AN ORGANIZED HEALTH CARE EDUCATION/TRAINING PROGRAM

## 2022-10-28 PROCEDURE — 99285 EMERGENCY DEPT VISIT HI MDM: CPT | Mod: 25

## 2022-10-28 PROCEDURE — 36415 COLL VENOUS BLD VENIPUNCTURE: CPT | Performed by: STUDENT IN AN ORGANIZED HEALTH CARE EDUCATION/TRAINING PROGRAM

## 2022-10-28 PROCEDURE — 83735 ASSAY OF MAGNESIUM: CPT | Performed by: STUDENT IN AN ORGANIZED HEALTH CARE EDUCATION/TRAINING PROGRAM

## 2022-10-28 PROCEDURE — S0030 INJECTION, METRONIDAZOLE: HCPCS | Performed by: HOSPITALIST

## 2022-10-28 PROCEDURE — 11000001 HC ACUTE MED/SURG PRIVATE ROOM

## 2022-10-28 PROCEDURE — 82803 BLOOD GASES ANY COMBINATION: CPT

## 2022-10-28 PROCEDURE — 93005 ELECTROCARDIOGRAM TRACING: CPT

## 2022-10-28 PROCEDURE — 25000003 PHARM REV CODE 250: Performed by: STUDENT IN AN ORGANIZED HEALTH CARE EDUCATION/TRAINING PROGRAM

## 2022-10-28 PROCEDURE — 84484 ASSAY OF TROPONIN QUANT: CPT | Performed by: STUDENT IN AN ORGANIZED HEALTH CARE EDUCATION/TRAINING PROGRAM

## 2022-10-28 PROCEDURE — 63600175 PHARM REV CODE 636 W HCPCS: Performed by: STUDENT IN AN ORGANIZED HEALTH CARE EDUCATION/TRAINING PROGRAM

## 2022-10-28 PROCEDURE — 25000003 PHARM REV CODE 250: Performed by: HOSPITALIST

## 2022-10-28 PROCEDURE — 80053 COMPREHEN METABOLIC PANEL: CPT | Performed by: STUDENT IN AN ORGANIZED HEALTH CARE EDUCATION/TRAINING PROGRAM

## 2022-10-28 PROCEDURE — 83605 ASSAY OF LACTIC ACID: CPT | Performed by: STUDENT IN AN ORGANIZED HEALTH CARE EDUCATION/TRAINING PROGRAM

## 2022-10-28 PROCEDURE — 63600175 PHARM REV CODE 636 W HCPCS: Performed by: PHYSICIAN ASSISTANT

## 2022-10-28 PROCEDURE — 93010 ELECTROCARDIOGRAM REPORT: CPT | Mod: ,,, | Performed by: INTERNAL MEDICINE

## 2022-10-28 PROCEDURE — 25000003 PHARM REV CODE 250: Performed by: PHYSICIAN ASSISTANT

## 2022-10-28 PROCEDURE — 85730 THROMBOPLASTIN TIME PARTIAL: CPT | Performed by: STUDENT IN AN ORGANIZED HEALTH CARE EDUCATION/TRAINING PROGRAM

## 2022-10-28 PROCEDURE — 36600 WITHDRAWAL OF ARTERIAL BLOOD: CPT

## 2022-10-28 PROCEDURE — 81001 URINALYSIS AUTO W/SCOPE: CPT | Performed by: STUDENT IN AN ORGANIZED HEALTH CARE EDUCATION/TRAINING PROGRAM

## 2022-10-28 PROCEDURE — 83690 ASSAY OF LIPASE: CPT | Performed by: STUDENT IN AN ORGANIZED HEALTH CARE EDUCATION/TRAINING PROGRAM

## 2022-10-28 PROCEDURE — 99900035 HC TECH TIME PER 15 MIN (STAT)

## 2022-10-28 PROCEDURE — 85025 COMPLETE CBC W/AUTO DIFF WBC: CPT | Performed by: STUDENT IN AN ORGANIZED HEALTH CARE EDUCATION/TRAINING PROGRAM

## 2022-10-28 PROCEDURE — 87040 BLOOD CULTURE FOR BACTERIA: CPT | Performed by: STUDENT IN AN ORGANIZED HEALTH CARE EDUCATION/TRAINING PROGRAM

## 2022-10-28 RX ORDER — SODIUM CHLORIDE 0.9 % (FLUSH) 0.9 %
10 SYRINGE (ML) INJECTION EVERY 12 HOURS PRN
Status: DISCONTINUED | OUTPATIENT
Start: 2022-10-28 | End: 2022-10-31 | Stop reason: HOSPADM

## 2022-10-28 RX ORDER — ACETAMINOPHEN 325 MG/1
650 TABLET ORAL EVERY 4 HOURS PRN
Status: DISCONTINUED | OUTPATIENT
Start: 2022-10-28 | End: 2022-10-31 | Stop reason: HOSPADM

## 2022-10-28 RX ORDER — IBUPROFEN 200 MG
24 TABLET ORAL
Status: DISCONTINUED | OUTPATIENT
Start: 2022-10-28 | End: 2022-10-31 | Stop reason: HOSPADM

## 2022-10-28 RX ORDER — SODIUM CHLORIDE 9 MG/ML
INJECTION, SOLUTION INTRAVENOUS CONTINUOUS
Status: DISCONTINUED | OUTPATIENT
Start: 2022-10-28 | End: 2022-10-30

## 2022-10-28 RX ORDER — ONDANSETRON 2 MG/ML
4 INJECTION INTRAMUSCULAR; INTRAVENOUS EVERY 4 HOURS PRN
Status: DISCONTINUED | OUTPATIENT
Start: 2022-10-28 | End: 2022-10-31 | Stop reason: HOSPADM

## 2022-10-28 RX ORDER — CIPROFLOXACIN 2 MG/ML
400 INJECTION, SOLUTION INTRAVENOUS
Status: DISCONTINUED | OUTPATIENT
Start: 2022-10-28 | End: 2022-10-30

## 2022-10-28 RX ORDER — GLUCAGON 1 MG
1 KIT INJECTION
Status: DISCONTINUED | OUTPATIENT
Start: 2022-10-28 | End: 2022-10-31 | Stop reason: HOSPADM

## 2022-10-28 RX ORDER — METRONIDAZOLE 500 MG/100ML
500 INJECTION, SOLUTION INTRAVENOUS
Status: DISCONTINUED | OUTPATIENT
Start: 2022-10-28 | End: 2022-10-30

## 2022-10-28 RX ORDER — INSULIN ASPART 100 [IU]/ML
1-10 INJECTION, SOLUTION INTRAVENOUS; SUBCUTANEOUS EVERY 6 HOURS PRN
Status: DISCONTINUED | OUTPATIENT
Start: 2022-10-28 | End: 2022-10-31 | Stop reason: HOSPADM

## 2022-10-28 RX ORDER — IBUPROFEN 200 MG
16 TABLET ORAL
Status: DISCONTINUED | OUTPATIENT
Start: 2022-10-28 | End: 2022-10-31 | Stop reason: HOSPADM

## 2022-10-28 RX ORDER — ACETAMINOPHEN 325 MG/1
650 TABLET ORAL EVERY 8 HOURS PRN
Status: DISCONTINUED | OUTPATIENT
Start: 2022-10-28 | End: 2022-10-31 | Stop reason: HOSPADM

## 2022-10-28 RX ADMIN — ONDANSETRON 4 MG: 2 INJECTION INTRAMUSCULAR; INTRAVENOUS at 10:10

## 2022-10-28 RX ADMIN — CIPROFLOXACIN 400 MG: 2 INJECTION, SOLUTION INTRAVENOUS at 09:10

## 2022-10-28 RX ADMIN — ACETAMINOPHEN 650 MG: 325 TABLET ORAL at 10:10

## 2022-10-28 RX ADMIN — SODIUM CHLORIDE: 9 INJECTION, SOLUTION INTRAVENOUS at 09:10

## 2022-10-28 RX ADMIN — PIPERACILLIN AND TAZOBACTAM 4.5 G: 4; .5 INJECTION, POWDER, LYOPHILIZED, FOR SOLUTION INTRAVENOUS; PARENTERAL at 06:10

## 2022-10-28 RX ADMIN — METRONIDAZOLE 500 MG: 5 INJECTION, SOLUTION INTRAVENOUS at 09:10

## 2022-10-28 RX ADMIN — SODIUM CHLORIDE: 9 INJECTION, SOLUTION INTRAVENOUS at 04:10

## 2022-10-28 RX ADMIN — SODIUM CHLORIDE, POTASSIUM CHLORIDE, SODIUM LACTATE AND CALCIUM CHLORIDE 1313 ML: 600; 310; 30; 20 INJECTION, SOLUTION INTRAVENOUS at 03:10

## 2022-10-28 RX ADMIN — METRONIDAZOLE 500 MG: 5 INJECTION, SOLUTION INTRAVENOUS at 04:10

## 2022-10-28 RX ADMIN — SODIUM CHLORIDE 1000 ML: 9 INJECTION, SOLUTION INTRAVENOUS at 03:10

## 2022-10-28 NOTE — H&P
[facility]  Hospital Medicine History & Physical Examination       Patient Name: Cleopatra Campos  MRN: [id number]  Patient Class: IP- Inpatient   Admission Date: 10/28/2022   Admitting Physician:  Service   Attending Physician: Gabo Mcgee MD  Primary Care Provider: Aquilino Weinberg MD  Face-to-Face encounter date: 10/28/2022  Code Status:Code Status Discussion Note   Chief Complaint: Abdominal Pain (C/o abdominal pain, N/V, and diarrhea x5 days. Denies dysuria or fever. Pt reports not being able to keep solids down. Pt appears lethargic and pale in triage. CB   )          Patient information was obtained from patient, patient's family, past medical records and ER records.     HISTORY OF PRESENT ILLNESS:   Cleopatra Campos is a 57 y.o. female who  has a past medical history of Clotting disorder, Coronary artery disease, Diabetes mellitus, type 2, DVT (deep venous thrombosis), Heart attack, Hyperlipidemia, Hypertension, Hypothyroidism, and Thyroid disease.. The patient presented to [facility] on 10/28/2022     57-year-old female with past medical history of diabetes, history of DVT on Eliquis, hyperthyroidism, hypertension, and diverticulosis presents to the ER with worsening abdominal pain over the last few days.  Denies any fevers at home that she is also little groggy from it and pain medicine earlier.  Most of history is taken from ER physician note.  She does endorse some midepigastric pain and also on the right lower quadrant.  This seems more diffuse on palpation.  She has been having nausea and vomiting for the last 5 days and also with some mucousy diarrhea.  No bleeding noted. in the emergency room her vital signs were significant for tachypnea and borderline low blood pressure.  She was placed on 4 L nasal cannula vital see report of hypoxemia this.  Her white count was significantly elevated at 17.  She does have some anemia but this appears to be chronic.  Potassium was 5.6 and  "sodium was 128.  Bicarb 17.  Creatinine was up to 3.32.  I do not have a baseline on her.  PH was compensated at 7.38.  Glucose is one hundred ninety-three.  CT of the abdomen reveals scattered sigmoid diverticulosis with suspected early changes of diverticulitis.  No suggestion of disruption or perforation.  Also some possible colitis included.  The hospitalist group was asked to admit for further workup and treatment.    PAST MEDICAL HISTORY:     Past Medical History:   Diagnosis Date    Clotting disorder     Coronary artery disease     Diabetes mellitus, type 2     DVT (deep venous thrombosis)     Heart attack     Hyperlipidemia     Hypertension     Hypothyroidism     Thyroid disease        PAST SURGICAL HISTORY:     Past Surgical History:   Procedure Laterality Date    APPENDECTOMY      CARDIAC CATHETERIZATION      CARPAL TUNNEL RELEASE       SECTION      CORONARY ARTERY BYPASS GRAFT      HYSTERECTOMY      ROTATOR CUFF REPAIR Right     TRIGGER FINGER RELEASE         ALLERGIES:   Enoxaparin, Meperidine, Meperidine hcl, and Heparin    FAMILY HISTORY:   Reviewed and negative    SOCIAL HISTORY:     Social History     Tobacco Use    Smoking status: Former    Smokeless tobacco: Never    Tobacco comments:     Pt quit as of recent hospitalization for CABG/MI.    Substance Use Topics    Alcohol use: Not Currently        HOME MEDICATIONS:     Prior to Admission medications    Medication Sig Start Date End Date Taking? Authorizing Provider   APIXABAN ORAL Take by mouth 2 (two) times a day.    Historical Provider   atorvastatin (LIPITOR) 80 MG tablet Take 80 mg by mouth once daily. 22   Historical Provider   BD GINA 2ND GEN PEN NEEDLE 32 gauge x 5/32" Ndle  22   Historical Provider   blood-glucose meter kit Use as instructed 22  Aquilino Weinberg MD   doxepin (SINEQUAN) 25 MG capsule Take 1 capsule (25 mg total) by mouth every evening. 10/17/22 10/17/23  Aquilino Weinberg MD   DULoxetine (CYMBALTA) " 30 MG capsule Take 30 mg by mouth once daily. 1/28/22   Historical Provider   ELIQUIS 5 mg Tab Take 1 tablet (5 mg total) by mouth 2 (two) times a day.  Patient not taking: Reported on 10/12/2022 8/30/22   Aquilino Weinberg MD   fenofibrate 160 MG Tab Take 160 mg by mouth once daily. 9/13/21   Historical Provider   hydrocortisone (CORTEF) 10 MG Tab Take 10 mg by mouth once daily. At 3 pm 7/27/22   Historical Provider   hydrocortisone (CORTEF) 5 MG Tab Take 15 mg by mouth once daily. With breakfast. 7/28/22   Historical Provider   insulin detemir U-100 (LEVEMIR FLEXTOUCH U-100 INSULN) 100 unit/mL (3 mL) InPn pen Inject 100 Units into the skin 2 (two) times daily. 6/8/22 8/8/23  Aquilino Weinberg MD   levothyroxine (SYNTHROID) 100 MCG tablet Take 100 mcg by mouth once daily. 11/22/21   Historical Provider   liraglutide 0.6 mg/0.1 mL, 18 mg/3 mL, subq PNIJ (VICTOZA 2-DREW) 0.6 mg/0.1 mL (18 mg/3 mL) PnIj pen Inject 1.8 mg into the skin once daily. 11/30/21   Historical Provider   losartan (COZAAR) 100 MG tablet Take 1 tablet (100 mg total) by mouth once daily. 8/30/22 8/30/23  Aquilino Weinberg MD   metoprolol tartrate (LOPRESSOR) 50 MG tablet Take 1 tablet (50 mg total) by mouth 2 (two) times daily. 8/30/22 8/30/23  Aquilino Weinberg MD   ondansetron (ZOFRAN) 4 MG tablet Take 4 mg by mouth every 8 (eight) hours as needed. 8/5/22   Historical Provider   verapamiL (CALAN-SR) 240 MG CR tablet Take 240 mg by mouth once daily.    Historical Provider       REVIEW OF SYSTEMS:   Except as documented, all other systems reviewed and negative     PHYSICAL EXAM:     VITAL SIGNS: 24 HRS MIN & MAX LAST   Temp  Min: 98.4 °F (36.9 °C)  Max: 98.4 °F (36.9 °C) 98.4 °F (36.9 °C)   BP  Min: 80/54  Max: 152/68 (!) 121/53     Pulse  Min: 90  Max: 101  90   Resp  Min: 17  Max: 35 (!) 23     SpO2  Min: 95 %  Max: 100 % 98 %       General appearance: Well-developed, well-nourished female in moderate distress  HENT: Atraumatic head. Moist mucous  membranes of oral cavity.  Eyes: Normal extraocular movements.   Neck: Supple.   Lungs: Clear to auscultation bilaterally. No wheezing present.   Heart: Regular rate and rhythm. S1 and S2 present with no murmurs/gallop/rub. No pedal edema. No JVD present.   Abdomen: obese, diffuse tenderness and guarding, no rebound  Extremities: No cyanosis, clubbing, or edema.  Skin: No Rash.   Neuro: Motor and sensory exams grossly intact. Good tone. Muscle strength 5/5 in all 4 extremities  Psych/mental status: Appropriate mood and affect. Responds appropriately to questions.     LABS AND IMAGING:     Recent Labs   Lab 10/28/22  0506   WBC 17.1*   RBC 3.57*   HGB 10.4*   HCT 31.6*   MCV 88.5   MCH 29.1   MCHC 32.9*   RDW 14.6   *   MPV 9.5       Recent Labs   Lab 10/28/22  0244 10/28/22  0445   *  --    K 5.6*  --    CO2 17*  --    BUN 26.9*  --    CREATININE 3.32*  --    CALCIUM 9.7  --    PH  --  7.380   MG 1.80  --    ALBUMIN 2.9*  --    ALKPHOS 172*  --    ALT 11  --    AST 15  --    BILITOT 1.0  --        Microbiology Results (last 7 days)       Procedure Component Value Units Date/Time    Blood culture x two cultures. Draw prior to antibiotics. [[id number]] Collected: 10/28/22 0257    Order Status: Resulted Specimen: Blood from Antecubital, Left Updated: 10/28/22 0341    Blood culture x two cultures. Draw prior to antibiotics. [[id number]] Collected: 10/28/22 0257    Order Status: Resulted Specimen: Blood from Forearm, Left Updated: 10/28/22 0336             X-Ray Chest AP Portable  EXAMINATION  XR CHEST AP PORTABLE    CLINICAL HISTORY  Sepsis;    TECHNIQUE  A total of 1 frontal view(s) of the chest.    COMPARISON  None available at the time of initial interpretation.    FINDINGS  Lines/tubes/devices: ECG leads overlie the imaged region.    Mediastinal postoperative changes are noted.  The cardiac silhouette is prominent in size, with mild central vascular congestion.  The trachea is midline. There is no lobar  consolidation, pleural effusion, or pneumothorax.    There is no acute osseous or extrathoracic abnormality.    IMPRESSION  1. No acute cardiopulmonary process.  2. Nonacute secondary details discussed above.    Electronically signed by: Roger Chavis  Date:    10/28/2022  Time:    07:10  CT Abdomen Pelvis  Without Contrast  EXAMINATION  CT ABDOMEN PELVIS WITHOUT CONTRAST    CLINICAL HISTORY  Abdominal pain, acute, nonlocalized;Right lower quadrant, epigastric abdominal pain, nausea vomiting diarrhea x5 days;    TECHNIQUE  Non-contrast helical-acquisition CT images were obtained and multiplanar reformats accomplished by a CT technologist at a separate workstation, pushed to PACS for physician review.    Enteric contrast: none utilized    COMPARISON  None available at the time of initial interpretation.    FINDINGS  Images were reviewed in soft tissue, lung, and bone windows.    Exam quality: Inherently limited evaluation of the abdominopelvic organs and vasculature secondary to non-contrast protocol.    Lines/tubes: none visualized    Cardiopulmonary: Visualized heart chambers are normal volume.  Small layering left pleural effusion is present, with adjacent airspace consolidation likely representing atelectasis.  No significant right pleural or pericardial fluid is identified.    Hepatobiliary/Pancreas: No acute or focal liver abnormality.  The gallbladder is not clearly visualized.  No convincing obstructive biliary process.  There is no evidence of expansile pancreatic lesion, ductal dilatation, or peripancreatic inflammatory changes.    Spleen: No acute or focal abnormality.    Adrenals/: No suspicious adrenal or renal lesion. No radiopaque urolithiasis or evidence of distal obstructive uropathy.  No focal bladder wall thickening or convincing intraluminal abnormality.  No suspicious adnexal findings.    Esophagus/GI tract: The included lower esophagus is unremarkable.  No evidence of gastric outlet or small  "bowel obstruction. Multiple diverticula are seen at the descending and sigmoid colon. Mild inflammatory stranding is noted through this region (series 2, image 69; series 4, image 46), raising concern for mild diverticulitis. The cecum is sub-hepatic in location and shows questionable mild wall thickening, extending to the hepatic flexure, which could represent element of colitis.    Peritoneal/Extraperitoneal Spaces: No free fluid or air, and no drainable collections.  Included vascular structures are without evidence of acute or focal abnormality. No pathologic katerin enlargement or necrotic process.    Musculoskeletal: No acute process or suspicious focal abnormality.  Degenerative changes are present throughout the included spinal column and bony pelvis.    IMPRESSION  1. Scattered sigmoid diverticulosis with suspected early changes of diverticulitis.  No findings to suggest full-thickness mural disruption or pericolonic drainable collection.  2. Mild mural prominence of the proximal colon is nonspecific, element of colitis not excluded.  3. Small left pleural effusion with adjacent atelectasis.  4. Additional chronic secondary details discussed above.  ==========    No significant discrepancy identified in relation to the teleradiology preliminary report.    RADIATION DOSE  Automated tube current modulation, weight-based exposure dosing, and/or iterative reconstruction technique utilized to reach lowest reasonably achievable exposure rate.    DLP: 863 mGy*cm    Electronically signed by: Roger Chavis  Date:    10/28/2022  Time:    07:03      _____________________________________  INPATIENT LIST OF MEDICATIONS   No current facility-administered medications for this encounter.    Current Outpatient Medications:     APIXABAN ORAL, Take by mouth 2 (two) times a day., Disp: , Rfl:     atorvastatin (LIPITOR) 80 MG tablet, Take 80 mg by mouth once daily., Disp: , Rfl:     BD GINA 2ND GEN PEN NEEDLE 32 gauge x 5/32" " Ndle, , Disp: , Rfl:     blood-glucose meter kit, Use as instructed, Disp: 1 each, Rfl: 0    doxepin (SINEQUAN) 25 MG capsule, Take 1 capsule (25 mg total) by mouth every evening., Disp: 30 capsule, Rfl: 11    DULoxetine (CYMBALTA) 30 MG capsule, Take 30 mg by mouth once daily., Disp: , Rfl:     ELIQUIS 5 mg Tab, Take 1 tablet (5 mg total) by mouth 2 (two) times a day. (Patient not taking: Reported on 10/12/2022), Disp: 180 tablet, Rfl: 3    fenofibrate 160 MG Tab, Take 160 mg by mouth once daily., Disp: , Rfl:     hydrocortisone (CORTEF) 10 MG Tab, Take 10 mg by mouth once daily. At 3 pm, Disp: , Rfl:     hydrocortisone (CORTEF) 5 MG Tab, Take 15 mg by mouth once daily. With breakfast., Disp: , Rfl:     insulin detemir U-100 (LEVEMIR FLEXTOUCH U-100 INSULN) 100 unit/mL (3 mL) InPn pen, Inject 100 Units into the skin 2 (two) times daily., Disp: 60 mL, Rfl: 0    levothyroxine (SYNTHROID) 100 MCG tablet, Take 100 mcg by mouth once daily., Disp: , Rfl:     liraglutide 0.6 mg/0.1 mL, 18 mg/3 mL, subq PNIJ (VICTOZA 2-DREW) 0.6 mg/0.1 mL (18 mg/3 mL) PnIj pen, Inject 1.8 mg into the skin once daily., Disp: , Rfl:     losartan (COZAAR) 100 MG tablet, Take 1 tablet (100 mg total) by mouth once daily., Disp: 90 tablet, Rfl: 3    metoprolol tartrate (LOPRESSOR) 50 MG tablet, Take 1 tablet (50 mg total) by mouth 2 (two) times daily., Disp: 180 tablet, Rfl: 3    ondansetron (ZOFRAN) 4 MG tablet, Take 4 mg by mouth every 8 (eight) hours as needed., Disp: , Rfl:     verapamiL (CALAN-SR) 240 MG CR tablet, Take 240 mg by mouth once daily., Disp: , Rfl:       Scheduled Meds:    Continuous Infusions:  PRN Meds:.      VTE Prophylaxis: will be placed on appropriate DVT prophylaxis and will be advised to be as mobile as possible and sit in a chair as tolerated  _____________________________________    ASSESSMENT & PLAN:     Severe sepsis  diverticulitis  acute kidney injury   Hyperosmolar hyperglycemia syndrome  Electrolyte  imbalance  anion gap metabolic acidosis, compensated  History of: Essential hypertension, hypothyroidism, type 2 diabetes mellitus, atrial fibrillation on Eliquis, major depressive disorder, hyperlipidemia     Patient currently does not have any fluids running.  We will start her on D5 half-normal saline with some bicarbonate.  Will need to monitor her blood sugars but she wore needs to volume right now.  We will place her on some sliding scale insulin.  Hold oral antihyperglycemics and long-acting insulin for now.    okay for clear liquids if she can tolerate.    will start her empirically on Cipro and Flagyl for diverticulitis.  Monitor white count.    will repeat a BMP this afternoon.    She also needs a lactic acid level.  If elevated will trend out with fluids.  blood cultures have already been drawn.  She was given 1 dose of Zosyn in the emergency room.  okay to resume her home Eliquis and depression medications.  Monitor for any signs of respiratory or cardiac compromise.  Low threshold to transfer to the ICU if becomes unstable.    Critical care diagnosis: sepsis, iv antibiotics  Critical care interventions: hands on evaluation, review of labs/radiographs/records and discussions with family  Critical care time spent: >32 minutes

## 2022-10-28 NOTE — PROGRESS NOTES
Pharmacist Renal Dose Adjustment Note    Cleopatra Campos is a 57 y.o. female being treated with the medication ciprofloxacin    Patient Data:    Vital Signs (Most Recent):  Temp: 98.4 °F (36.9 °C) (10/28/22 0224)  Pulse: 90 (10/28/22 0734)  Resp: (!) 23 (10/28/22 0734)  BP: (!) 121/53 (10/28/22 0734)  SpO2: 98 % (10/28/22 0734)   Vital Signs (72h Range):  Temp:  [98.4 °F (36.9 °C)]   Pulse:  []   Resp:  [17-35]   BP: ()/(52-81)   SpO2:  [95 %-100 %]      Recent Labs   Lab 10/28/22  0244   CREATININE 3.32*     Serum creatinine: 3.32 mg/dL (H) 10/28/22 0244  Estimated creatinine clearance: 16.7 mL/min (A)    Ciprofloxacin 400 mg IV Q12H will be changed to ciprofloxacin 400 mg IV Q24H    Pharmacist's Name: Tameka Middleton, Yogi  Pharmacist's Extension: [phone]

## 2022-10-28 NOTE — Clinical Note
Diagnosis: Pneumonia of left lower lobe due to infectious organism [1946390]   Admitting Provider:: MARILY MOHAN [[id number]]   Future Attending Provider: MARILY MOHAN [[id number]]   Reason for IP Medical Treatment  (Clinical interventions that can only be accomplished in the IP setting? ) :: IV abx   Estimated Length of Stay:: 2 midnights   I certify that Inpatient services for greater than or equal to 2 midnights are medically necessary:: Yes   Plans for Post-Acute care--if anticipated (pick the single best option):: A. No post acute care anticipated at this time

## 2022-10-28 NOTE — ED PROVIDER NOTES
Encounter Date: 10/28/2022    SCRIBE #1 NOTE: I, Bao Massey, am scribing for, and in the presence of,  Peter Magallanes MD. I have scribed the following portions of the note - Other sections scribed: HPI, ROS, PE.     History     Chief Complaint   Patient presents with    Abdominal Pain     C/o abdominal pain, N/V, and diarrhea x5 days. Denies dysuria or fever. Pt reports not being able to keep solids down. Pt appears lethargic and pale in triage. CB        57 year old female with past medical history of CABG afib, and DM presents to ED c/o generalized abdominal pain onset 5 days ago. Pt reports that abdominal pain is intermittent. Pt also reports intermittent neck pain. Pt reports that she has associated symptoms of nausea, vomiting, dizziness and diarrhea. Pt denies fever and dysuria. Pt states that she is on eliquis. Nursing staff states pt O2 sat 90 on room air. Pt denies being on home O2. Per medical record, pt has had multiple abdominal surgeries.  Cardiologist:  Conner Light MD    The history is provided by the patient.   Abdominal Pain  Illness onset: 5 days ago. The onset of the illness was abrupt. The problem has not changed since onset.The abdominal pain is generalized. The abdominal pain does not radiate. The other symptoms of the illness include nausea, vomiting and diarrhea. The other symptoms of the illness do not include fever, fatigue, shortness of breath, dysuria or hematochezia.   The diarrhea began 3 to 5 days ago. Risk factors for illness producing diarrhea include new medications.   Nausea began 3 to 5 days ago.   Onset: 5 days ago.   Symptoms associated with the illness do not include chills, constipation, urgency, hematuria or frequency.   Review of patient's allergies indicates:   Allergen Reactions    Enoxaparin Other (See Comments)     Heparin induced thrombocytopenia     Meperidine Swelling    Meperidine hcl Swelling    Heparin      Past Medical History:   Diagnosis Date     Clotting disorder     Coronary artery disease     Diabetes mellitus, type 2     DVT (deep venous thrombosis)     Heart attack     Hyperlipidemia     Hypertension     Hypothyroidism     Thyroid disease      Past Surgical History:   Procedure Laterality Date    APPENDECTOMY      CARDIAC CATHETERIZATION      CARPAL TUNNEL RELEASE       SECTION      CORONARY ARTERY BYPASS GRAFT      HYSTERECTOMY      ROTATOR CUFF REPAIR Right     TRIGGER FINGER RELEASE       Family History   Problem Relation Age of Onset    Diabetes Mother     Cancer Father      Social History     Tobacco Use    Smoking status: Former    Smokeless tobacco: Never    Tobacco comments:     Pt quit as of recent hospitalization for CABG/MI.    Substance Use Topics    Alcohol use: Not Currently    Drug use: Never     Review of Systems   Constitutional:  Negative for chills, fatigue and fever.   HENT:  Negative for congestion, ear discharge, ear pain, nosebleeds, rhinorrhea and sore throat.    Eyes:  Negative for photophobia, pain, discharge and redness.   Respiratory:  Negative for cough, chest tightness, shortness of breath and wheezing.    Cardiovascular:  Negative for chest pain and leg swelling.   Gastrointestinal:  Positive for abdominal pain, diarrhea, nausea and vomiting. Negative for blood in stool, constipation and hematochezia.   Genitourinary:  Negative for dysuria, frequency, hematuria and urgency.   Musculoskeletal:  Negative for arthralgias, myalgias and neck pain.   Skin:  Negative for color change and rash.   Neurological:  Negative for dizziness, seizures, weakness, numbness and headaches.     Physical Exam     Initial Vitals [10/28/22 0224]   BP Pulse Resp Temp SpO2   (!) 80/54 90 20 98.4 °F (36.9 °C) 95 %      MAP       --         Physical Exam    Nursing note and vitals reviewed.  Constitutional: She appears well-developed and well-nourished. She is not diaphoretic. No distress.   HENT:   Head: Normocephalic and atraumatic.    Right Ear: External ear normal.   Left Ear: External ear normal.   Nose: Nose normal.   Mouth/Throat: Oropharynx is clear and moist.   Eyes: Conjunctivae and EOM are normal. Pupils are equal, round, and reactive to light. Right eye exhibits no discharge. Left eye exhibits no discharge. No scleral icterus.   Neck: Neck supple. No tracheal deviation present.   Normal range of motion.  Cardiovascular:  Normal rate, regular rhythm, normal heart sounds and intact distal pulses.     Exam reveals no gallop and no friction rub.       No murmur heard.  Pulmonary/Chest: Breath sounds normal. No stridor. No respiratory distress. She has no wheezes. She has no rhonchi. She has no rales. She exhibits no tenderness.   Abdominal: Abdomen is soft. Bowel sounds are normal. She exhibits no distension and no mass. There is abdominal tenderness (mild) in the epigastric area, left upper quadrant and left lower quadrant. There is no guarding.   Musculoskeletal:         General: No tenderness or edema. Normal range of motion.      Cervical back: Normal range of motion and neck supple.     Neurological: She is alert and oriented to person, place, and time. No cranial nerve deficit or sensory deficit.   Skin: Skin is warm and dry. Capillary refill takes less than 2 seconds. No rash noted. No erythema. No pallor.       ED Course   Procedures  Labs Reviewed   COMPREHENSIVE METABOLIC PANEL - Abnormal; Notable for the following components:       Result Value    Sodium Level 128 (*)     Potassium Level 5.6 (*)     Chloride 94 (*)     Carbon Dioxide 17 (*)     Glucose Level 178 (*)     Blood Urea Nitrogen 26.9 (*)     Creatinine 3.32 (*)     Albumin Level 2.9 (*)     Globulin 3.8 (*)     Albumin/Globulin Ratio 0.8 (*)     Alkaline Phosphatase 172 (*)     All other components within normal limits   APTT - Abnormal; Notable for the following components:    PTT 44.1 (*)     All other components within normal limits   BETA - HYDROXYBUTYRATE, SERUM  - Abnormal; Notable for the following components:    Beta Hydroxybutyrate 1.90 (*)     All other components within normal limits   CBC WITH DIFFERENTIAL - Abnormal; Notable for the following components:    WBC 17.1 (*)     RBC 3.57 (*)     Hgb 10.4 (*)     Hct 31.6 (*)     MCHC 32.9 (*)     Platelet 488 (*)     Neut # 11.8 (*)     Mono # 1.59 (*)     IG# 0.10 (*)     All other components within normal limits   POCT GLUCOSE, HAND-HELD DEVICE - Abnormal; Notable for the following components:    POC Glucose 193 (*)     All other components within normal limits   COVID/FLU A&B PCR - Normal    Narrative:     The Xpert Xpress SARS-CoV-2/FLU/RSV plus is a rapid, multiplexed real-time PCR test intended for the simultaneous qualitative detection and differentiation of SARS-CoV-2, Influenza A, Influenza B, and respiratory syncytial virus (RSV) viral RNA in either nasopharyngeal swab or nasal swab specimens.         MAGNESIUM - Normal   TROPONIN I - Normal   LIPASE - Normal   LACTIC ACID, PLASMA - Normal   BLOOD CULTURE OLG   BLOOD CULTURE OLG   CBC W/ AUTO DIFFERENTIAL    Narrative:     The following orders were created for panel order CBC auto differential.  Procedure                               Abnormality         Status                     ---------                               -----------         ------                     CBC with Differential[[id number]]        Abnormal            Final result                 Please view results for these tests on the individual orders.   URINALYSIS, REFLEX TO URINE CULTURE   POCT GLUCOSE MONITORING CONTINUOUS     EKG Readings: (Independently Interpreted)   Heart Rate: 91. T Waves Flipped: AVL and V2. Axis: Left Axis Deviation.   Time: 0231  Rhythm: sinus  QTC: 484  Poor R-wave progression     Imaging Results              CT Abdomen Pelvis  Without Contrast (Final result)  Result time 10/28/22 07:03:13   Procedure changed from CT Abdomen Pelvis With Contrast     Final result by Roger  MAUREEN Chavis MD (10/28/22 07:03:13)                   Narrative:    EXAMINATION  CT ABDOMEN PELVIS WITHOUT CONTRAST    CLINICAL HISTORY  Abdominal pain, acute, nonlocalized;Right lower quadrant, epigastric abdominal pain, nausea vomiting diarrhea x5 days;    TECHNIQUE  Non-contrast helical-acquisition CT images were obtained and multiplanar reformats accomplished by a CT technologist at a separate workstation, pushed to PACS for physician review.    Enteric contrast: none utilized    COMPARISON  None available at the time of initial interpretation.    FINDINGS  Images were reviewed in soft tissue, lung, and bone windows.    Exam quality: Inherently limited evaluation of the abdominopelvic organs and vasculature secondary to non-contrast protocol.    Lines/tubes: none visualized    Cardiopulmonary: Visualized heart chambers are normal volume.  Small layering left pleural effusion is present, with adjacent airspace consolidation likely representing atelectasis.  No significant right pleural or pericardial fluid is identified.    Hepatobiliary/Pancreas: No acute or focal liver abnormality.  The gallbladder is not clearly visualized.  No convincing obstructive biliary process.  There is no evidence of expansile pancreatic lesion, ductal dilatation, or peripancreatic inflammatory changes.    Spleen: No acute or focal abnormality.    Adrenals/: No suspicious adrenal or renal lesion. No radiopaque urolithiasis or evidence of distal obstructive uropathy.  No focal bladder wall thickening or convincing intraluminal abnormality.  No suspicious adnexal findings.    Esophagus/GI tract: The included lower esophagus is unremarkable.  No evidence of gastric outlet or small bowel obstruction. Multiple diverticula are seen at the descending and sigmoid colon. Mild inflammatory stranding is noted through this region (series 2, image 69; series 4, image 46), raising concern for mild diverticulitis. The cecum is sub-hepatic in location  and shows questionable mild wall thickening, extending to the hepatic flexure, which could represent element of colitis.    Peritoneal/Extraperitoneal Spaces: No free fluid or air, and no drainable collections.  Included vascular structures are without evidence of acute or focal abnormality. No pathologic katerin enlargement or necrotic process.    Musculoskeletal: No acute process or suspicious focal abnormality.  Degenerative changes are present throughout the included spinal column and bony pelvis.    IMPRESSION  1. Scattered sigmoid diverticulosis with suspected early changes of diverticulitis.  No findings to suggest full-thickness mural disruption or pericolonic drainable collection.  2. Mild mural prominence of the proximal colon is nonspecific, element of colitis not excluded.  3. Small left pleural effusion with adjacent atelectasis.  4. Additional chronic secondary details discussed above.  ==========    No significant discrepancy identified in relation to the teleradiology preliminary report.    RADIATION DOSE  Automated tube current modulation, weight-based exposure dosing, and/or iterative reconstruction technique utilized to reach lowest reasonably achievable exposure rate.    DLP: 863 mGy*cm      Electronically signed by: Roger Chavis  Date:    10/28/2022  Time:    07:03                      Preliminary result by Roger Chavis MD (10/28/22 04:20:13)                   Narrative:    START OF REPORT:  Technique: CT of the abdomen and pelvis was performed with axial images as well as sagittal and coronal reconstruction images without intravenous contrast.    Comparison: None available.    Clinical History: Abdominal pain, acute, nonlocalized; Right lower quadrant, epigastric abdominal pain, nausea vomiting diarrhea x5 days.    Dosage Information: Automated Exposure Control was utilized.    Findings:  Lines and Tubes: None.  Thorax:  Lungs: Patchy coalescing mild infiltrate is seen in the left lung base  consistent with infection with an element of sub-alveolar collapse.  Pleura: No pneumothorax is seen in the visualized lung bases. A small left pleural effusion is seen.  Heart: The heart appears somewhat prominent. There is a small pericardial effusion.  Abdomen:  Abdominal Wall: There is a subtle uncomplicated umbilical hernia which contains mesenteric fat.  Liver: The liver appears unremarkable.  Biliary System: No intrahepatic or extrahepatic biliary duct dilatation is seen.  Gallbladder: The gallbladder appears unremarkable.  Pancreas: Moderate pancreatic atrophy is seen. There is moderate fatty of the pancreas.  Spleen: The spleen appears unremarkable.  Adrenals: There are bilateral heterogeneous, well defined, ovoid lesions are seen in the adrenal glands measuring 1.8 x 1.4cm on the right and 2.2 x 1.2cm on the left. Features are consistent with bilateral adrenal adenoma.  Kidneys: The kidneys appear unremarkable with no stones cysts masses or hydronephrosis.  Aorta: The abdominal aorta appears unremarkable.  IVC: Unremarkable.  Bowel:  Esophagus: There is a small hiatal hernia. There appears to be mild thickening versus underdistention of the distal esophagus. Correlate clinically as regards possible reflux esophagitis.  Stomach: The stomach appears unremarkable.  Duodenum: Unremarkable appearing duodenum.  Small Bowel: The small bowel appears unremarkable.  Colon: Nondistended. Multiple diverticula are seen in the descending and sigmoid colon. Mild inflammatory stranding is seen around a diverticulum (series 2 image 69 and series 4 image 46) raising concern for mild diverticulitis. The cecum is sub-hepatic in location and shows questionable mild wall thickening, extending to the hepatic flexure, as seen on series 2, images 48 - 52. These features raise concern for focal colitis.  Appendix: The appendix appears unremarkable and is seen on âImage 78, Series 5â.  Peritoneum: No intraperitoneal free air or  ascites is seen.    Pelvis:  Bladder: The bladder appears unremarkable.  Female:  Uterus: The uterus is not identified.  Ovaries: The ovaries are not identified. No adnexal masses are seen.    Bony structures:  Dorsal Spine: There is mild spondylosis of the visualized dorsal spine.  Bony Pelvis: The visualized bony structures of the pelvis appear unremarkable.      Impression:  1. Multiple diverticula are seen in the descending and sigmoid colon. Mild inflammatory stranding is seen around a diverticulum (series 2 image 69 and series 4 image 46) raising concern for mild diverticulitis. The cecum is sub-hepatic in location and shows questionable mild wall thickening, extending to the hepatic flexure, as seen on series 2, images 48 - 52. These features raise concern for focal colitis.  2. Patchy coalescing mild infiltrate is seen in the left lung base consistent with infection with an element of sub-alveolar collapse.  3. A small left pleural effusion is seen.  4. Details and other findings as discussed above.                          Preliminary result by Heber Rodriguez Jr., MD (10/28/22 04:20:13)                   Narrative:    START OF REPORT:  Technique: CT of the abdomen and pelvis was performed with axial images as well as sagittal and coronal reconstruction images without intravenous contrast.    Comparison: None available.    Clinical History: Abdominal pain, acute, nonlocalized; Right lower quadrant, epigastric abdominal pain, nausea vomiting diarrhea x5 days.    Dosage Information: Automated Exposure Control was utilized.    Findings:  Lines and Tubes: None.  Thorax:  Lungs: Patchy coalescing mild infiltrate is seen in the left lung base consistent with infection with an element of sub-alveolar collapse.  Pleura: No pneumothorax is seen in the visualized lung bases. A small left pleural effusion is seen.  Heart: The heart appears somewhat prominent. There is a small pericardial  effusion.  Abdomen:  Abdominal Wall: There is a subtle uncomplicated umbilical hernia which contains mesenteric fat.  Liver: The liver appears unremarkable.  Biliary System: No intrahepatic or extrahepatic biliary duct dilatation is seen.  Gallbladder: The gallbladder appears unremarkable.  Pancreas: Moderate pancreatic atrophy is seen. There is moderate fatty of the pancreas.  Spleen: The spleen appears unremarkable.  Adrenals: There are bilateral heterogeneous, well defined, ovoid lesions are seen in the adrenal glands measuring 1.8 x 1.4cm on the right and 2.2 x 1.2cm on the left. Features are consistent with bilateral adrenal adenoma.  Kidneys: The kidneys appear unremarkable with no stones cysts masses or hydronephrosis.  Aorta: The abdominal aorta appears unremarkable.  IVC: Unremarkable.  Bowel:  Esophagus: There is a small hiatal hernia. There appears to be mild thickening versus underdistention of the distal esophagus. Correlate clinically as regards possible reflux esophagitis.  Stomach: The stomach appears unremarkable.  Duodenum: Unremarkable appearing duodenum.  Small Bowel: The small bowel appears unremarkable.  Colon: Nondistended. Multiple diverticula are seen in the descending and sigmoid colon. Mild inflammatory stranding is seen around a diverticulum (series 2 image 69 and series 4 image 46) raising concern for mild diverticulitis. The cecum is sub-hepatic in location and shows questionable mild wall thickening, extending to the hepatic flexure, as seen on series 2, images 48 - 52. These features raise concern for focal colitis.  Appendix: The appendix appears unremarkable and is seen on âImage 78, Series 5â.  Peritoneum: No intraperitoneal free air or ascites is seen.    Pelvis:  Bladder: The bladder appears unremarkable.  Female:  Uterus: The uterus is not identified.  Ovaries: The ovaries are not identified. No adnexal masses are seen.    Bony structures:  Dorsal Spine: There is mild  spondylosis of the visualized dorsal spine.  Bony Pelvis: The visualized bony structures of the pelvis appear unremarkable.      Impression:  1. Multiple diverticula are seen in the descending and sigmoid colon. Mild inflammatory stranding is seen around a diverticulum (series 2 image 69 and series 4 image 46) raising concern for mild diverticulitis. The cecum is sub-hepatic in location and shows questionable mild wall thickening, extending to the hepatic flexure, as seen on series 2, images 48 - 52. These features raise concern for focal colitis.  2. Patchy coalescing mild infiltrate is seen in the left lung base consistent with infection with an element of sub-alveolar collapse.  3. A small left pleural effusion is seen.  4. Details and other findings as discussed above.                                         X-Ray Chest AP Portable (Final result)  Result time 10/28/22 07:10:06      Final result by Roger Chavis MD (10/28/22 07:10:06)                   Narrative:    EXAMINATION  XR CHEST AP PORTABLE    CLINICAL HISTORY  Sepsis;    TECHNIQUE  A total of 1 frontal view(s) of the chest.    COMPARISON  None available at the time of initial interpretation.    FINDINGS  Lines/tubes/devices: ECG leads overlie the imaged region.    Mediastinal postoperative changes are noted.  The cardiac silhouette is prominent in size, with mild central vascular congestion.  The trachea is midline. There is no lobar consolidation, pleural effusion, or pneumothorax.    There is no acute osseous or extrathoracic abnormality.    IMPRESSION  1. No acute cardiopulmonary process.  2. Nonacute secondary details discussed above.      Electronically signed by: Roger Chavis  Date:    10/28/2022  Time:    07:10                                     Medications   sodium chloride 0.9% flush 10 mL (has no administration in time range)   ondansetron injection 4 mg (has no administration in time range)   acetaminophen tablet 650 mg (has no  administration in time range)   acetaminophen tablet 650 mg (has no administration in time range)   glucose chewable tablet 16 g (has no administration in time range)   glucose chewable tablet 24 g (has no administration in time range)   glucagon (human recombinant) injection 1 mg (has no administration in time range)   0.9%  NaCl infusion (has no administration in time range)   insulin aspart U-100 injection 1-10 Units (has no administration in time range)   dextrose 10% bolus 125 mL (has no administration in time range)   dextrose 10% bolus 250 mL (has no administration in time range)   ciprofloxacin (CIPRO)400mg/200ml D5W IVPB 400 mg (has no administration in time range)   metronidazole IVPB 500 mg (has no administration in time range)   sodium chloride 0.9% bolus 1,000 mL (0 mLs Intravenous Stopped 10/28/22 0344)   lactated ringers bolus 1,313 mL (0 mLs Intravenous Stopped 10/28/22 0440)   piperacillin-tazobactam (ZOSYN) 4.5 g in dextrose 5 % in water (D5W) 5 % 100 mL IVPB (MB+) (0 g Intravenous Stopped 10/28/22 0700)     Medical Decision Making:   Initial Assessment:   Patient presents with initial hypotension, nausea vomiting diarrhea x5 days.  Also new oxygen requirement  Differential Diagnosis:   Pneumonia, colitis, gastroenteritis, diverticulitis   Clinical Tests:   Lab Tests: Ordered and Reviewed  Radiological Study: Ordered and Reviewed  Medical Tests: Reviewed and Ordered  ED Management:  Patient is well-appearing here in the emergency department.  She is awake alert.  On re-evaluation she is found to be sleeping comfortably.  Her labs were revealing for leukocytosis.  CT scan with concern for left-sided pneumonia, left pleural effusion, possible colitis.  Patient will be given Zosyn to treat both respiratory and abdominal infection.  Admitted for further evaluation and management.  She is amenable to plan.  Rehydration is started here in the emergency department.  She is afebrile.  Requires 2 L nasal  cannula.  Patient be admitted for further evaluation management.        Scribe Attestation:   Scribe #1: I performed the above scribed service and the documentation accurately describes the services I performed. I attest to the accuracy of the note.    Attending Attestation:           Physician Attestation for Scribe:  Physician Attestation Statement for Scribe #1: I, Peter Magallanes MD, reviewed documentation, as scribed by Bao Massey in my presence, and it is both accurate and complete.                        Clinical Impression:   Final diagnoses:  [R53.1] Weakness  [J18.9] Pneumonia of left lower lobe due to infectious organism (Primary)  [J90] Pleural effusion  [K52.9] Colitis  [K52.9] Gastroenteritis  [R73.9] Hyperglycemia  [E87.1] Hyponatremia        ED Disposition Condition    Admit Stable                Peter Magallanes MD  10/28/22 0926

## 2022-10-29 LAB
ALBUMIN SERPL-MCNC: 2.5 GM/DL (ref 3.5–5)
ALBUMIN/GLOB SERPL: 0.6 RATIO (ref 1.1–2)
ALP SERPL-CCNC: 156 UNIT/L (ref 40–150)
ALT SERPL-CCNC: 13 UNIT/L (ref 0–55)
AST SERPL-CCNC: 17 UNIT/L (ref 5–34)
BASOPHILS # BLD AUTO: 0.02 X10(3)/MCL (ref 0–0.2)
BASOPHILS NFR BLD AUTO: 0.2 %
BILIRUBIN DIRECT+TOT PNL SERPL-MCNC: 0.7 MG/DL
BUN SERPL-MCNC: 19.5 MG/DL (ref 9.8–20.1)
CALCIUM SERPL-MCNC: 9.5 MG/DL (ref 8.4–10.2)
CHLORIDE SERPL-SCNC: 103 MMOL/L (ref 98–107)
CO2 SERPL-SCNC: 20 MMOL/L (ref 22–29)
CREAT SERPL-MCNC: 2.1 MG/DL (ref 0.55–1.02)
EOSINOPHIL # BLD AUTO: 0.13 X10(3)/MCL (ref 0–0.9)
EOSINOPHIL NFR BLD AUTO: 1.3 %
ERYTHROCYTE [DISTWIDTH] IN BLOOD BY AUTOMATED COUNT: 14.5 % (ref 11.5–17)
GFR SERPLBLD CREATININE-BSD FMLA CKD-EPI: 27 MLS/MIN/1.73/M2
GLOBULIN SER-MCNC: 4.1 GM/DL (ref 2.4–3.5)
GLUCOSE SERPL-MCNC: 200 MG/DL (ref 74–100)
HCT VFR BLD AUTO: 28.3 % (ref 37–47)
HGB BLD-MCNC: 9.1 GM/DL (ref 12–16)
IMM GRANULOCYTES # BLD AUTO: 0.05 X10(3)/MCL (ref 0–0.04)
IMM GRANULOCYTES NFR BLD AUTO: 0.5 %
LACTATE SERPL-SCNC: 1 MMOL/L (ref 0.5–2.2)
LYMPHOCYTES # BLD AUTO: 1.52 X10(3)/MCL (ref 0.6–4.6)
LYMPHOCYTES NFR BLD AUTO: 15 %
MAGNESIUM SERPL-MCNC: 1.7 MG/DL (ref 1.6–2.6)
MCH RBC QN AUTO: 28.3 PG (ref 27–31)
MCHC RBC AUTO-ENTMCNC: 32.2 MG/DL (ref 33–36)
MCV RBC AUTO: 88.2 FL (ref 80–94)
MONOCYTES # BLD AUTO: 1.05 X10(3)/MCL (ref 0.1–1.3)
MONOCYTES NFR BLD AUTO: 10.4 %
NEUTROPHILS # BLD AUTO: 7.4 X10(3)/MCL (ref 2.1–9.2)
NEUTROPHILS NFR BLD AUTO: 72.6 %
NRBC BLD AUTO-RTO: 0 %
PLATELET # BLD AUTO: 370 X10(3)/MCL (ref 130–400)
PMV BLD AUTO: 9 FL (ref 7.4–10.4)
POCT GLUCOSE: 194 MG/DL (ref 70–110)
POCT GLUCOSE: 205 MG/DL (ref 70–110)
POCT GLUCOSE: 237 MG/DL (ref 70–110)
POCT GLUCOSE: 264 MG/DL (ref 70–110)
POTASSIUM SERPL-SCNC: 4.7 MMOL/L (ref 3.5–5.1)
PROT SERPL-MCNC: 6.6 GM/DL (ref 6.4–8.3)
RBC # BLD AUTO: 3.21 X10(6)/MCL (ref 4.2–5.4)
SODIUM SERPL-SCNC: 134 MMOL/L (ref 136–145)
WBC # SPEC AUTO: 10.1 X10(3)/MCL (ref 4.5–11.5)

## 2022-10-29 PROCEDURE — 36415 COLL VENOUS BLD VENIPUNCTURE: CPT | Performed by: PHYSICIAN ASSISTANT

## 2022-10-29 PROCEDURE — 25000003 PHARM REV CODE 250: Performed by: PHYSICIAN ASSISTANT

## 2022-10-29 PROCEDURE — 83605 ASSAY OF LACTIC ACID: CPT | Performed by: HOSPITALIST

## 2022-10-29 PROCEDURE — 25000003 PHARM REV CODE 250: Performed by: NURSE PRACTITIONER

## 2022-10-29 PROCEDURE — 83735 ASSAY OF MAGNESIUM: CPT | Performed by: NURSE PRACTITIONER

## 2022-10-29 PROCEDURE — 25000003 PHARM REV CODE 250: Performed by: HOSPITALIST

## 2022-10-29 PROCEDURE — 80053 COMPREHEN METABOLIC PANEL: CPT | Performed by: PHYSICIAN ASSISTANT

## 2022-10-29 PROCEDURE — 63600175 PHARM REV CODE 636 W HCPCS: Performed by: HOSPITALIST

## 2022-10-29 PROCEDURE — 27000221 HC OXYGEN, UP TO 24 HOURS

## 2022-10-29 PROCEDURE — 85025 COMPLETE CBC W/AUTO DIFF WBC: CPT | Performed by: PHYSICIAN ASSISTANT

## 2022-10-29 PROCEDURE — 63600175 PHARM REV CODE 636 W HCPCS: Performed by: PHYSICIAN ASSISTANT

## 2022-10-29 PROCEDURE — 11000001 HC ACUTE MED/SURG PRIVATE ROOM

## 2022-10-29 PROCEDURE — S0030 INJECTION, METRONIDAZOLE: HCPCS | Performed by: HOSPITALIST

## 2022-10-29 RX ORDER — VERAPAMIL HYDROCHLORIDE 240 MG/1
240 TABLET, FILM COATED, EXTENDED RELEASE ORAL DAILY
Status: DISCONTINUED | OUTPATIENT
Start: 2022-10-29 | End: 2022-10-29

## 2022-10-29 RX ORDER — ZOLPIDEM TARTRATE 5 MG/1
5 TABLET ORAL NIGHTLY PRN
Status: DISCONTINUED | OUTPATIENT
Start: 2022-10-29 | End: 2022-10-31 | Stop reason: HOSPADM

## 2022-10-29 RX ORDER — METOPROLOL TARTRATE 50 MG/1
50 TABLET ORAL 2 TIMES DAILY
Status: DISCONTINUED | OUTPATIENT
Start: 2022-10-29 | End: 2022-10-31 | Stop reason: HOSPADM

## 2022-10-29 RX ORDER — HYDROCORTISONE 5 MG/1
15 TABLET ORAL DAILY
Status: DISCONTINUED | OUTPATIENT
Start: 2022-10-29 | End: 2022-10-31 | Stop reason: HOSPADM

## 2022-10-29 RX ORDER — HYDROCORTISONE 10 MG/1
10 TABLET ORAL DAILY
Status: DISCONTINUED | OUTPATIENT
Start: 2022-10-29 | End: 2022-10-31 | Stop reason: HOSPADM

## 2022-10-29 RX ORDER — DOXEPIN HYDROCHLORIDE 25 MG/1
25 CAPSULE ORAL NIGHTLY
Status: DISCONTINUED | OUTPATIENT
Start: 2022-10-29 | End: 2022-10-31 | Stop reason: HOSPADM

## 2022-10-29 RX ORDER — VERAPAMIL HYDROCHLORIDE 40 MG/1
40 TABLET ORAL 3 TIMES DAILY
COMMUNITY
End: 2023-06-22 | Stop reason: SDUPTHER

## 2022-10-29 RX ORDER — LEVOTHYROXINE SODIUM 100 UG/1
100 TABLET ORAL DAILY
Status: DISCONTINUED | OUTPATIENT
Start: 2022-10-29 | End: 2022-10-31 | Stop reason: HOSPADM

## 2022-10-29 RX ORDER — DULOXETIN HYDROCHLORIDE 30 MG/1
30 CAPSULE, DELAYED RELEASE ORAL DAILY
Status: DISCONTINUED | OUTPATIENT
Start: 2022-10-29 | End: 2022-10-31 | Stop reason: HOSPADM

## 2022-10-29 RX ORDER — VERAPAMIL HYDROCHLORIDE 40 MG/1
40 TABLET ORAL 3 TIMES DAILY
Status: DISCONTINUED | OUTPATIENT
Start: 2022-10-29 | End: 2022-10-31 | Stop reason: HOSPADM

## 2022-10-29 RX ADMIN — LEVOTHYROXINE SODIUM 100 MCG: 100 TABLET ORAL at 09:10

## 2022-10-29 RX ADMIN — METRONIDAZOLE 500 MG: 5 INJECTION, SOLUTION INTRAVENOUS at 01:10

## 2022-10-29 RX ADMIN — APIXABAN 5 MG: 5 TABLET, FILM COATED ORAL at 09:10

## 2022-10-29 RX ADMIN — METRONIDAZOLE 500 MG: 5 INJECTION, SOLUTION INTRAVENOUS at 05:10

## 2022-10-29 RX ADMIN — ONDANSETRON 4 MG: 2 INJECTION INTRAMUSCULAR; INTRAVENOUS at 12:10

## 2022-10-29 RX ADMIN — METRONIDAZOLE 500 MG: 5 INJECTION, SOLUTION INTRAVENOUS at 09:10

## 2022-10-29 RX ADMIN — VERAPAMIL HYDROCHLORIDE 40 MG: 40 TABLET, FILM COATED ORAL at 09:10

## 2022-10-29 RX ADMIN — ACETAMINOPHEN 650 MG: 325 TABLET ORAL at 09:10

## 2022-10-29 RX ADMIN — INSULIN ASPART 4 UNITS: 100 INJECTION, SOLUTION INTRAVENOUS; SUBCUTANEOUS at 06:10

## 2022-10-29 RX ADMIN — INSULIN ASPART 6 UNITS: 100 INJECTION, SOLUTION INTRAVENOUS; SUBCUTANEOUS at 12:10

## 2022-10-29 RX ADMIN — METOPROLOL TARTRATE 50 MG: 50 TABLET, FILM COATED ORAL at 09:10

## 2022-10-29 RX ADMIN — DULOXETINE 30 MG: 30 CAPSULE, DELAYED RELEASE ORAL at 09:10

## 2022-10-29 RX ADMIN — ACETAMINOPHEN 650 MG: 325 TABLET ORAL at 03:10

## 2022-10-29 RX ADMIN — HYDROCORTISONE 10 MG: 10 TABLET ORAL at 01:10

## 2022-10-29 RX ADMIN — ZOLPIDEM TARTRATE 5 MG: 5 TABLET ORAL at 09:10

## 2022-10-29 RX ADMIN — CIPROFLOXACIN 400 MG: 2 INJECTION, SOLUTION INTRAVENOUS at 09:10

## 2022-10-29 RX ADMIN — HYDROCORTISONE 15 MG: 10 TABLET ORAL at 09:10

## 2022-10-29 NOTE — PROGRESS NOTES
[facility]  Hospital Medicine Progress Note        Chief Complaint: Inpatient Follow-up for pain    HPI:   57 y.o. female who  has a past medical history of Clotting disorder, Coronary artery disease, Diabetes mellitus, type 2, DVT (deep venous thrombosis), Heart attack, Hyperlipidemia, Hypertension, Hypothyroidism, and Thyroid disease.. The patient presented to [facility] on 10/28/2022      57-year-old female with past medical history of diabetes, history of DVT on Eliquis, hyperthyroidism, hypertension, and diverticulosis presents to the ER with worsening abdominal pain over the last few days.  Denies any fevers at home that she is also little groggy from it and pain medicine earlier.  Most of history is taken from ER physician note.  She does endorse some midepigastric pain and also on the right lower quadrant.  This seems more diffuse on palpation.  She has been having nausea and vomiting for the last 5 days and also with some mucousy diarrhea.  No bleeding noted. in the emergency room her vital signs were significant for tachypnea and borderline low blood pressure.  She was placed on 4 L nasal cannula vital see report of hypoxemia this.  Her white count was significantly elevated at 17.  She does have some anemia but this appears to be chronic.  Potassium was 5.6 and sodium was 128.  Bicarb 17.  Creatinine was up to 3.32.  I do not have a baseline on her.  PH was compensated at 7.38.  Glucose is one hundred ninety-three.  CT of the abdomen reveals scattered sigmoid diverticulosis with suspected early changes of diverticulitis.  No suggestion of disruption or perforation.  Also some possible colitis included.  The hospitalist group was asked to admit for further workup and treatment.    Interval Hx:  Significant improvement from yesterday.  She denies any current abdominal pain or nausea.  Asking for diet can be advanced.  No fevers overnight.  Still some loose stools with seems to be  slowing down.  Otherwise making great progress.    Objective/physical exam:  General: In no acute distress, afebrile  Chest: Clear to auscultation bilaterally  Heart: RRR, +S1, S2, no appreciable murmur  Abdomen: Soft, nontender, BS +  MSK: Warm, no lower extremity edema, no clubbing or cyanosis  Neurologic: Alert and oriented x4, Cranial nerve II-XII intact, Strength 5/5 in all 4 extremities    VITAL SIGNS: 24 HRS MIN & MAX LAST   Temp  Min: 98.1 °F (36.7 °C)  Max: 99 °F (37.2 °C) 99 °F (37.2 °C)   BP  Min: 91/52  Max: 164/82 (!) 157/70     Pulse  Min: 90  Max: 104  102   Resp  Min: 17  Max: 24 (!) 24     SpO2  Min: 96 %  Max: 100 % 100 %       Recent Labs   Lab 10/28/22  0506 10/29/22  0358   WBC 17.1* 10.1   RBC 3.57* 3.21*   HGB 10.4* 9.1*   HCT 31.6* 28.3*   MCV 88.5 88.2   MCH 29.1 28.3   MCHC 32.9* 32.2*   RDW 14.6 14.5   * 370   MPV 9.5 9.0       Recent Labs   Lab 10/28/22  0244 10/28/22  0445 10/29/22  0358   *  --  134*   K 5.6*  --  4.7   CO2 17*  --  20*   BUN 26.9*  --  19.5   CREATININE 3.32*  --  2.10*   CALCIUM 9.7  --  9.5   PH  --  7.380  --    MG 1.80  --  1.70   ALBUMIN 2.9*  --  2.5*   ALKPHOS 172*  --  156*   ALT 11  --  13   AST 15  --  17   BILITOT 1.0  --  0.7          Microbiology Results (last 7 days)       Procedure Component Value Units Date/Time    Blood culture x two cultures. Draw prior to antibiotics. [[id number]]  (Normal) Collected: 10/28/22 0257    Order Status: Completed Specimen: Blood from Antecubital, Left Updated: 10/29/22 0402     CULTURE, BLOOD ([facility]) No Growth At 24 Hours    Blood culture x two cultures. Draw prior to antibiotics. [[id number]]  (Normal) Collected: 10/28/22 0257    Order Status: Completed Specimen: Blood from Forearm, Left Updated: 10/29/22 0403     CULTURE, BLOOD ([facility]) No Growth At 24 Hours             See below for Radiology    Scheduled Med:   ciprofloxacin  400 mg Intravenous Q24H    metronidazole  500 mg Intravenous Q8H        Continuous  Infusions:   sodium chloride 0.9% 200 mL/hr at 10/28/22 1649        PRN Meds:  acetaminophen, acetaminophen, dextrose 10%, dextrose 10%, glucagon (human recombinant), glucose, glucose, insulin aspart U-100, ondansetron, sodium chloride 0.9%       Assessment/Plan:   Severe sepsis  diverticulitis  acute kidney injury   Hyperosmolar hyperglycemia syndrome  Electrolyte imbalance  anion gap metabolic acidosis, compensated  History of: Essential hypertension, hypothyroidism, type 2 diabetes mellitus, atrial fibrillation on Eliquis, major depressive disorder, hyperlipidemia     Leukocytosis has resolved but still some tachypnea and blood pressure is elevated.  She has also had some low-grade fevers.  Will continue with IV antibiotics for now but could potentially switch over to oral tomorrow.    She has had significant improvement in her abdominal pain and nausea.  Will advance her diet as tolerated today.    Continue IV fluids.  Metabolic acidosis is greatly improved and anion gap has closed.    Will review her home medications and restart them as appropriate. Holding ARB due to KIN. Discussed we may need to restart her home insulin now that she will be eating.  Will continue to monitor closely.    Could potentially go home in next 24-48 hours depending on how well she does.     Critical care diagnosis: sepsis, iv antibiotics  Critical care interventions: hands on evaluation, review of labs/radiographs/records and discussions with family  Critical care time spent: >32 minutes    Patient condition:  Stable    Anticipated discharge and Disposition: TBD      All diagnosis and differential diagnosis have been reviewed; assessment and plan has been documented; I have personally reviewed the labs and test results that are presently available; I have reviewed the patients medication list; I have reviewed the consulting providers response and recommendations. I have reviewed or attempted to review medical records based upon their  availability    All of the patient's questions have been  addressed and answered. Patient's is agreeable to the above stated plan. I will continue to monitor closely and make adjustments to medical management as needed.  _____________________________________________________________________      Radiology:  X-Ray Chest AP Portable  EXAMINATION  XR CHEST AP PORTABLE    CLINICAL HISTORY  Sepsis;    TECHNIQUE  A total of 1 frontal view(s) of the chest.    COMPARISON  None available at the time of initial interpretation.    FINDINGS  Lines/tubes/devices: ECG leads overlie the imaged region.    Mediastinal postoperative changes are noted.  The cardiac silhouette is prominent in size, with mild central vascular congestion.  The trachea is midline. There is no lobar consolidation, pleural effusion, or pneumothorax.    There is no acute osseous or extrathoracic abnormality.    IMPRESSION  1. No acute cardiopulmonary process.  2. Nonacute secondary details discussed above.    Electronically signed by: Roger Chavis  Date:    10/28/2022  Time:    07:10  CT Abdomen Pelvis  Without Contrast  EXAMINATION  CT ABDOMEN PELVIS WITHOUT CONTRAST    CLINICAL HISTORY  Abdominal pain, acute, nonlocalized;Right lower quadrant, epigastric abdominal pain, nausea vomiting diarrhea x5 days;    TECHNIQUE  Non-contrast helical-acquisition CT images were obtained and multiplanar reformats accomplished by a CT technologist at a separate workstation, pushed to PACS for physician review.    Enteric contrast: none utilized    COMPARISON  None available at the time of initial interpretation.    FINDINGS  Images were reviewed in soft tissue, lung, and bone windows.    Exam quality: Inherently limited evaluation of the abdominopelvic organs and vasculature secondary to non-contrast protocol.    Lines/tubes: none visualized    Cardiopulmonary: Visualized heart chambers are normal volume.  Small layering left pleural effusion is present, with adjacent  airspace consolidation likely representing atelectasis.  No significant right pleural or pericardial fluid is identified.    Hepatobiliary/Pancreas: No acute or focal liver abnormality.  The gallbladder is not clearly visualized.  No convincing obstructive biliary process.  There is no evidence of expansile pancreatic lesion, ductal dilatation, or peripancreatic inflammatory changes.    Spleen: No acute or focal abnormality.    Adrenals/: No suspicious adrenal or renal lesion. No radiopaque urolithiasis or evidence of distal obstructive uropathy.  No focal bladder wall thickening or convincing intraluminal abnormality.  No suspicious adnexal findings.    Esophagus/GI tract: The included lower esophagus is unremarkable.  No evidence of gastric outlet or small bowel obstruction. Multiple diverticula are seen at the descending and sigmoid colon. Mild inflammatory stranding is noted through this region (series 2, image 69; series 4, image 46), raising concern for mild diverticulitis. The cecum is sub-hepatic in location and shows questionable mild wall thickening, extending to the hepatic flexure, which could represent element of colitis.    Peritoneal/Extraperitoneal Spaces: No free fluid or air, and no drainable collections.  Included vascular structures are without evidence of acute or focal abnormality. No pathologic katerin enlargement or necrotic process.    Musculoskeletal: No acute process or suspicious focal abnormality.  Degenerative changes are present throughout the included spinal column and bony pelvis.    IMPRESSION  1. Scattered sigmoid diverticulosis with suspected early changes of diverticulitis.  No findings to suggest full-thickness mural disruption or pericolonic drainable collection.  2. Mild mural prominence of the proximal colon is nonspecific, element of colitis not excluded.  3. Small left pleural effusion with adjacent atelectasis.  4. Additional chronic secondary details discussed  above.  ==========    No significant discrepancy identified in relation to the teleradiology preliminary report.    RADIATION DOSE  Automated tube current modulation, weight-based exposure dosing, and/or iterative reconstruction technique utilized to reach lowest reasonably achievable exposure rate.    DLP: 863 mGy*cm    Electronically signed by: Roger Chavis  Date:    10/28/2022  Time:    07:03      Gabo Mcgee MD   10/29/2022

## 2022-10-30 LAB
ANION GAP SERPL CALC-SCNC: 9 MEQ/L
BASOPHILS # BLD AUTO: 0.02 X10(3)/MCL (ref 0–0.2)
BASOPHILS NFR BLD AUTO: 0.3 %
BUN SERPL-MCNC: 12.6 MG/DL (ref 9.8–20.1)
CALCIUM SERPL-MCNC: 9.5 MG/DL (ref 8.4–10.2)
CHLORIDE SERPL-SCNC: 105 MMOL/L (ref 98–107)
CO2 SERPL-SCNC: 20 MMOL/L (ref 22–29)
CREAT SERPL-MCNC: 1.31 MG/DL (ref 0.55–1.02)
CREAT/UREA NIT SERPL: 10
EOSINOPHIL # BLD AUTO: 0.19 X10(3)/MCL (ref 0–0.9)
EOSINOPHIL NFR BLD AUTO: 2.5 %
ERYTHROCYTE [DISTWIDTH] IN BLOOD BY AUTOMATED COUNT: 14.4 % (ref 11.5–17)
GFR SERPLBLD CREATININE-BSD FMLA CKD-EPI: 47 MLS/MIN/1.73/M2
GLUCOSE SERPL-MCNC: 192 MG/DL (ref 74–100)
HCT VFR BLD AUTO: 28.2 % (ref 37–47)
HGB BLD-MCNC: 9 GM/DL (ref 12–16)
IMM GRANULOCYTES # BLD AUTO: 0.03 X10(3)/MCL (ref 0–0.04)
IMM GRANULOCYTES NFR BLD AUTO: 0.4 %
LYMPHOCYTES # BLD AUTO: 2.11 X10(3)/MCL (ref 0.6–4.6)
LYMPHOCYTES NFR BLD AUTO: 28.1 %
MCH RBC QN AUTO: 28.4 PG (ref 27–31)
MCHC RBC AUTO-ENTMCNC: 31.9 MG/DL (ref 33–36)
MCV RBC AUTO: 89 FL (ref 80–94)
MONOCYTES # BLD AUTO: 0.62 X10(3)/MCL (ref 0.1–1.3)
MONOCYTES NFR BLD AUTO: 8.3 %
NEUTROPHILS # BLD AUTO: 4.5 X10(3)/MCL (ref 2.1–9.2)
NEUTROPHILS NFR BLD AUTO: 60.4 %
NRBC BLD AUTO-RTO: 0 %
PLATELET # BLD AUTO: 407 X10(3)/MCL (ref 130–400)
PMV BLD AUTO: 9.1 FL (ref 7.4–10.4)
POCT GLUCOSE: 174 MG/DL (ref 70–110)
POCT GLUCOSE: 200 MG/DL (ref 70–110)
POCT GLUCOSE: 265 MG/DL (ref 70–110)
POTASSIUM SERPL-SCNC: 4.4 MMOL/L (ref 3.5–5.1)
RBC # BLD AUTO: 3.17 X10(6)/MCL (ref 4.2–5.4)
SODIUM SERPL-SCNC: 134 MMOL/L (ref 136–145)
WBC # SPEC AUTO: 7.5 X10(3)/MCL (ref 4.5–11.5)

## 2022-10-30 PROCEDURE — 80048 BASIC METABOLIC PNL TOTAL CA: CPT | Performed by: HOSPITALIST

## 2022-10-30 PROCEDURE — 63600175 PHARM REV CODE 636 W HCPCS: Performed by: PHYSICIAN ASSISTANT

## 2022-10-30 PROCEDURE — 25000003 PHARM REV CODE 250: Performed by: HOSPITALIST

## 2022-10-30 PROCEDURE — 25000003 PHARM REV CODE 250: Performed by: PHYSICIAN ASSISTANT

## 2022-10-30 PROCEDURE — S0030 INJECTION, METRONIDAZOLE: HCPCS | Performed by: HOSPITALIST

## 2022-10-30 PROCEDURE — 36415 COLL VENOUS BLD VENIPUNCTURE: CPT | Performed by: HOSPITALIST

## 2022-10-30 PROCEDURE — 85025 COMPLETE CBC W/AUTO DIFF WBC: CPT | Performed by: HOSPITALIST

## 2022-10-30 PROCEDURE — 11000001 HC ACUTE MED/SURG PRIVATE ROOM

## 2022-10-30 RX ORDER — CIPROFLOXACIN 500 MG/1
500 TABLET ORAL EVERY 12 HOURS
Status: DISCONTINUED | OUTPATIENT
Start: 2022-10-30 | End: 2022-10-31 | Stop reason: HOSPADM

## 2022-10-30 RX ORDER — METRONIDAZOLE 500 MG/1
500 TABLET ORAL EVERY 8 HOURS
Status: DISCONTINUED | OUTPATIENT
Start: 2022-10-30 | End: 2022-10-31 | Stop reason: HOSPADM

## 2022-10-30 RX ADMIN — APIXABAN 5 MG: 5 TABLET, FILM COATED ORAL at 09:10

## 2022-10-30 RX ADMIN — APIXABAN 5 MG: 5 TABLET, FILM COATED ORAL at 08:10

## 2022-10-30 RX ADMIN — INSULIN ASPART 1 UNITS: 100 INJECTION, SOLUTION INTRAVENOUS; SUBCUTANEOUS at 01:10

## 2022-10-30 RX ADMIN — Medication 1 EACH: at 09:10

## 2022-10-30 RX ADMIN — CIPROFLOXACIN 500 MG: 500 TABLET, FILM COATED ORAL at 09:10

## 2022-10-30 RX ADMIN — VERAPAMIL HYDROCHLORIDE 40 MG: 40 TABLET, FILM COATED ORAL at 04:10

## 2022-10-30 RX ADMIN — LEVOTHYROXINE SODIUM 100 MCG: 100 TABLET ORAL at 08:10

## 2022-10-30 RX ADMIN — VERAPAMIL HYDROCHLORIDE 40 MG: 40 TABLET, FILM COATED ORAL at 08:10

## 2022-10-30 RX ADMIN — INSULIN ASPART 2 UNITS: 100 INJECTION, SOLUTION INTRAVENOUS; SUBCUTANEOUS at 06:10

## 2022-10-30 RX ADMIN — METRONIDAZOLE 500 MG: 5 INJECTION, SOLUTION INTRAVENOUS at 12:10

## 2022-10-30 RX ADMIN — CIPROFLOXACIN 500 MG: 500 TABLET, FILM COATED ORAL at 08:10

## 2022-10-30 RX ADMIN — INSULIN ASPART 6 UNITS: 100 INJECTION, SOLUTION INTRAVENOUS; SUBCUTANEOUS at 11:10

## 2022-10-30 RX ADMIN — METRONIDAZOLE 500 MG: 500 TABLET ORAL at 01:10

## 2022-10-30 RX ADMIN — INSULIN ASPART 6 UNITS: 100 INJECTION, SOLUTION INTRAVENOUS; SUBCUTANEOUS at 04:10

## 2022-10-30 RX ADMIN — ACETAMINOPHEN 650 MG: 325 TABLET ORAL at 09:10

## 2022-10-30 RX ADMIN — VERAPAMIL HYDROCHLORIDE 40 MG: 40 TABLET, FILM COATED ORAL at 09:10

## 2022-10-30 RX ADMIN — METOPROLOL TARTRATE 50 MG: 50 TABLET, FILM COATED ORAL at 08:10

## 2022-10-30 RX ADMIN — HYDROCORTISONE 10 MG: 10 TABLET ORAL at 01:10

## 2022-10-30 RX ADMIN — DULOXETINE 30 MG: 30 CAPSULE, DELAYED RELEASE ORAL at 08:10

## 2022-10-30 RX ADMIN — METRONIDAZOLE 500 MG: 500 TABLET ORAL at 09:10

## 2022-10-30 RX ADMIN — METOPROLOL TARTRATE 50 MG: 50 TABLET, FILM COATED ORAL at 09:10

## 2022-10-30 NOTE — PROGRESS NOTES
[facility]  Hospital Medicine Progress Note        Chief Complaint: Inpatient Follow-up for  pain     HPI:   57 y.o. female who  has a past medical history of Clotting disorder, Coronary artery disease, Diabetes mellitus, type 2, DVT (deep venous thrombosis), Heart attack, Hyperlipidemia, Hypertension, Hypothyroidism, and Thyroid disease.. The patient presented to [facility] on 10/28/2022      57-year-old female with past medical history of diabetes, history of DVT on Eliquis, hyperthyroidism, hypertension, and diverticulosis presents to the ER with worsening abdominal pain over the last few days.  Denies any fevers at home that she is also little groggy from it and pain medicine earlier.  Most of history is taken from ER physician note.  She does endorse some midepigastric pain and also on the right lower quadrant.  This seems more diffuse on palpation.  She has been having nausea and vomiting for the last 5 days and also with some mucousy diarrhea.  No bleeding noted. in the emergency room her vital signs were significant for tachypnea and borderline low blood pressure.  She was placed on 4 L nasal cannula vital see report of hypoxemia this.  Her white count was significantly elevated at 17.  She does have some anemia but this appears to be chronic.  Potassium was 5.6 and sodium was 128.  Bicarb 17.  Creatinine was up to 3.32.  I do not have a baseline on her.  PH was compensated at 7.38.  Glucose is one hundred ninety-three.  CT of the abdomen reveals scattered sigmoid diverticulosis with suspected early changes of diverticulitis.  No suggestion of disruption or perforation.  Also some possible colitis included.  The hospitalist group was asked to admit for further workup and treatment.     Interval Hx:   patient reports that she did have an episode of nausea and vomiting after lunch yesterday.  Feeling better this morning thinks she can not eat some breakfast.  Discussed that will take  it slow and see how she is feeling.  P.r.n. antiemetics available.  Vitals are stable.  No fevers.  Waiting on labs this morning.  Discussed that if she tolerates p.o. can likely be discharged tomorrow.  Would like to make sure she gets a meal and before we change her over to oral antibiotics.    Objective/physical exam:  General: In no acute distress, afebrile  Chest: Clear to auscultation bilaterally  Heart: RRR, +S1, S2, no appreciable murmur  Abdomen: Soft, nontender, BS +  MSK: Warm, no lower extremity edema, no clubbing or cyanosis  Neurologic: Alert and oriented x4, Cranial nerve II-XII intact, Strength 5/5 in all 4 extremities    VITAL SIGNS: 24 HRS MIN & MAX LAST   Temp  Min: 97.6 °F (36.4 °C)  Max: 99 °F (37.2 °C) 98.1 °F (36.7 °C)   BP  Min: 102/70  Max: 157/70 (!) 146/77     Pulse  Min: 73  Max: 102  89   Resp  Min: 20  Max: 24 20   SpO2  Min: 97 %  Max: 100 % 100 %       Recent Labs   Lab 10/28/22  0506 10/29/22  0358   WBC 17.1* 10.1   RBC 3.57* 3.21*   HGB 10.4* 9.1*   HCT 31.6* 28.3*   MCV 88.5 88.2   MCH 29.1 28.3   MCHC 32.9* 32.2*   RDW 14.6 14.5   * 370   MPV 9.5 9.0       Recent Labs   Lab 10/28/22  0244 10/28/22  0445 10/29/22  0358   *  --  134*   K 5.6*  --  4.7   CO2 17*  --  20*   BUN 26.9*  --  19.5   CREATININE 3.32*  --  2.10*   CALCIUM 9.7  --  9.5   PH  --  7.380  --    MG 1.80  --  1.70   ALBUMIN 2.9*  --  2.5*   ALKPHOS 172*  --  156*   ALT 11  --  13   AST 15  --  17   BILITOT 1.0  --  0.7          Microbiology Results (last 7 days)       Procedure Component Value Units Date/Time    Blood culture x two cultures. Draw prior to antibiotics. [[id number]]  (Normal) Collected: 10/28/22 0257    Order Status: Completed Specimen: Blood from Antecubital, Left Updated: 10/30/22 0402     CULTURE, BLOOD ([facility]) No Growth At 48 Hours    Blood culture x two cultures. Draw prior to antibiotics. [[id number]]  (Normal) Collected: 10/28/22 0257    Order Status: Completed Specimen: Blood from  Forearm, Left Updated: 10/30/22 0402     CULTURE, BLOOD ([facility]) No Growth At 48 Hours             See below for Radiology    Scheduled Med:   apixaban  5 mg Oral BID    ciprofloxacin  400 mg Intravenous Q24H    doxepin  25 mg Oral QHS    DULoxetine  30 mg Oral Daily    hydrocortisone  10 mg Oral Daily    hydrocortisone  15 mg Oral Daily    levothyroxine  100 mcg Oral Daily    metoprolol tartrate  50 mg Oral BID    metronidazole  500 mg Intravenous Q8H    verapamiL  40 mg Oral TID        Continuous Infusions:   sodium chloride 0.9% 200 mL/hr at 10/28/22 1649        PRN Meds:  acetaminophen, acetaminophen, dextrose 10%, dextrose 10%, glucagon (human recombinant), glucose, glucose, insulin aspart U-100, ondansetron, sodium chloride 0.9%, zolpidem       Assessment/Plan:   Severe sepsis  diverticulitis  acute kidney injury   Hyperosmolar hyperglycemia syndrome  Electrolyte imbalance  anion gap metabolic acidosis, compensated  History of: Essential hypertension, hypothyroidism, type 2 diabetes mellitus, atrial fibrillation on Eliquis, major depressive disorder, hyperlipidemia      Labs stabilized.  No further tachypnea.  Mildly elevated blood pressure but stable.  Will discontinue IV fluids as this may be driving her pressure up as well.  Tolerating p.o..    Will transition her over to oral antibiotics today.    Repeat labs tomorrow morning and if stable can likely be discharged home.    Will follow up her renal function this morning.  Currently holding her ARB due to acute kidney injury.  May need to hold for a few more days and resume as an outpatient if creatinine is still elevated.    Will tentatively plan to send the patient home tomorrow if she is feeling well     Patient condition:  Stable    Anticipated discharge and Disposition: Home tomorrow      All diagnosis and differential diagnosis have been reviewed; assessment and plan has been documented; I have personally reviewed the labs and test results that are  presently available; I have reviewed the patients medication list; I have reviewed the consulting providers response and recommendations. I have reviewed or attempted to review medical records based upon their availability    All of the patient's questions have been  addressed and answered. Patient's is agreeable to the above stated plan. I will continue to monitor closely and make adjustments to medical management as needed.  _____________________________________________________________________      Radiology:  X-Ray Chest AP Portable  EXAMINATION  XR CHEST AP PORTABLE    CLINICAL HISTORY  Sepsis;    TECHNIQUE  A total of 1 frontal view(s) of the chest.    COMPARISON  None available at the time of initial interpretation.    FINDINGS  Lines/tubes/devices: ECG leads overlie the imaged region.    Mediastinal postoperative changes are noted.  The cardiac silhouette is prominent in size, with mild central vascular congestion.  The trachea is midline. There is no lobar consolidation, pleural effusion, or pneumothorax.    There is no acute osseous or extrathoracic abnormality.    IMPRESSION  1. No acute cardiopulmonary process.  2. Nonacute secondary details discussed above.    Electronically signed by: Roger Chavis  Date:    10/28/2022  Time:    07:10  CT Abdomen Pelvis  Without Contrast  EXAMINATION  CT ABDOMEN PELVIS WITHOUT CONTRAST    CLINICAL HISTORY  Abdominal pain, acute, nonlocalized;Right lower quadrant, epigastric abdominal pain, nausea vomiting diarrhea x5 days;    TECHNIQUE  Non-contrast helical-acquisition CT images were obtained and multiplanar reformats accomplished by a CT technologist at a separate workstation, pushed to PACS for physician review.    Enteric contrast: none utilized    COMPARISON  None available at the time of initial interpretation.    FINDINGS  Images were reviewed in soft tissue, lung, and bone windows.    Exam quality: Inherently limited evaluation of the abdominopelvic organs and  vasculature secondary to non-contrast protocol.    Lines/tubes: none visualized    Cardiopulmonary: Visualized heart chambers are normal volume.  Small layering left pleural effusion is present, with adjacent airspace consolidation likely representing atelectasis.  No significant right pleural or pericardial fluid is identified.    Hepatobiliary/Pancreas: No acute or focal liver abnormality.  The gallbladder is not clearly visualized.  No convincing obstructive biliary process.  There is no evidence of expansile pancreatic lesion, ductal dilatation, or peripancreatic inflammatory changes.    Spleen: No acute or focal abnormality.    Adrenals/: No suspicious adrenal or renal lesion. No radiopaque urolithiasis or evidence of distal obstructive uropathy.  No focal bladder wall thickening or convincing intraluminal abnormality.  No suspicious adnexal findings.    Esophagus/GI tract: The included lower esophagus is unremarkable.  No evidence of gastric outlet or small bowel obstruction. Multiple diverticula are seen at the descending and sigmoid colon. Mild inflammatory stranding is noted through this region (series 2, image 69; series 4, image 46), raising concern for mild diverticulitis. The cecum is sub-hepatic in location and shows questionable mild wall thickening, extending to the hepatic flexure, which could represent element of colitis.    Peritoneal/Extraperitoneal Spaces: No free fluid or air, and no drainable collections.  Included vascular structures are without evidence of acute or focal abnormality. No pathologic katerin enlargement or necrotic process.    Musculoskeletal: No acute process or suspicious focal abnormality.  Degenerative changes are present throughout the included spinal column and bony pelvis.    IMPRESSION  1. Scattered sigmoid diverticulosis with suspected early changes of diverticulitis.  No findings to suggest full-thickness mural disruption or pericolonic drainable collection.  2. Mild  mural prominence of the proximal colon is nonspecific, element of colitis not excluded.  3. Small left pleural effusion with adjacent atelectasis.  4. Additional chronic secondary details discussed above.  ==========    No significant discrepancy identified in relation to the teleradiology preliminary report.    RADIATION DOSE  Automated tube current modulation, weight-based exposure dosing, and/or iterative reconstruction technique utilized to reach lowest reasonably achievable exposure rate.    DLP: 863 mGy*cm    Electronically signed by: Roger Chavis  Date:    10/28/2022  Time:    07:03      Gabo Mcgee MD   10/30/2022

## 2022-10-30 NOTE — PROGRESS NOTES
Inpatient Nutrition Assessment    Admit Date: 10/28/2022   Total duration of encounter: 2 days     Nutrition Recommendation/Prescription     - continue diabetic diet; encourage intake of best tolerated foods    Communication of Recommendations: reviewed with patient/caregiver    Nutrition Assessment     Malnutrition Assessment/Nutrition-Focused Physical Exam    Malnutrition in the context of acute illness or injury  Degree of Malnutrition: severe malnutrition  Energy Intake: </= 50% of estimated energy requirement for >/= 5 days  Interpretation of Weight Loss: >2% in 1 week  Body Fat:does not meet criteria  Area of Body Fat Loss: does not meet criteria  Muscle Mass Loss: mild depletion  Area of Muscle Mass Loss: temple region - temporalis muscle  Fluid Accumulation: does not meet criteria  Edema: no edema present   Reduced  Strength: unable to obtain  A minimum of two characteristics is recommended for diagnosis of either severe or non-severe malnutrition.    Chart Review    Reason Seen: malnutrition screening tool    Diagnosis:  Severe sepsis  diverticulitis  acute kidney injury   Hyperosmolar hyperglycemia syndrome  Electrolyte imbalance  anion gap metabolic acidosis, compensated    Relevant Medical History: Essential hypertension, hypothyroidism, type 2 diabetes mellitus, atrial fibrillation on Eliquis, major depressive disorder, hyperlipidemia     Nutrition-Related Medications: lactobacillus  Calorie Containing IV Medications: no significant kcals from medications at this time    Nutrition-Related Labs:  10/30: Na 134, Crea 1.31, GFR 47, Glu 192    Diet/PN Order: Diet diabetic 1800 kcal, Soft (Fiber Restricted)  Oral Supplement Order: none  Tube Feeding Order: none  Appetite/Oral Intake: fair/50-75% of meals  Factors Affecting Nutritional Intake: nausea and vomiting  Food/Hoahaoism/Cultural Preferences: none reported  Food Allergies: none reported       Wound(s):   none    Comments    10/30: pt reports n/v  for 5 days before admit up until today; no vomiting today, feeling better, ate some solid food; mentioned she would not like to gain back the weight that she lost    Anthropometrics    Height: 5' (152.4 cm) Height Method: Stated  Last Weight: 77.1 kg (169 lb 15.6 oz) (10/30/22 1517) Weight Method: Stated  BMI (Calculated): 33.2  BMI Classification: obese grade I (BMI 30-34.9)     Ideal Body Weight (IBW), Female: 100 lb     % Ideal Body Weight, Female (lb): 169.98 %                    Usual Body Weight (UBW), k.3 kg  % Usual Body Weight: 89.53  % Weight Change From Usual Weight: -10.66 %  Usual Weight Provided By: EMR weight history    Wt Readings from Last 5 Encounters:   10/30/22 77.1 kg (169 lb 15.6 oz)   10/12/22 86.6 kg (190 lb 14.4 oz)     Weight Change(s) Since Admission:  Admit Weight: 77.1 kg (170 lb) (10/28/22 0224)      Estimated Needs    Weight Used For Calorie Calculations: 77.1 kg (169 lb 15.6 oz)  Energy Calorie Requirements (kcal): 1520     Weight Used For Protein Calculations: 77.1 kg (169 lb 15.6 oz)  Protein Requirements: 92gm (1.2 gm/kg)  Fluid Requirements (mL): 1520ml (1ml/kcal)  Temp: 97.6 °F (36.4 °C)       Enteral Nutrition    Patient not receiving enteral nutrition at this time.    Parenteral Nutrition    Patient not receiving parenteral nutrition support at this time.    Evaluation of Received Nutrient Intake    Calories: not meeting estimated needs  Protein: not meeting estimated needs    Patient Education    Not applicable.    Nutrition Diagnosis     PES: Malnutrition related to n/v as evidenced by <50% energy intake for > 5 days, weight loss >2% in one week. (new)    Interventions/Goals     Intervention(s): general/healthful diet  Goal: Meet greater than 75% of nutritional needs by follow-up. (goal progressing)    Monitoring & Evaluation     Dietitian will monitor food and beverage intake and weight change.  Nutrition Risk/Follow-Up: high (follow-up in 1-4 days)   Please consult if  re-assessment needed sooner.

## 2022-10-31 VITALS
BODY MASS INDEX: 33.38 KG/M2 | OXYGEN SATURATION: 98 % | HEART RATE: 80 BPM | DIASTOLIC BLOOD PRESSURE: 81 MMHG | RESPIRATION RATE: 18 BRPM | WEIGHT: 170 LBS | HEIGHT: 60 IN | SYSTOLIC BLOOD PRESSURE: 148 MMHG | TEMPERATURE: 98 F

## 2022-10-31 PROBLEM — K57.92 DIVERTICULITIS: Status: ACTIVE | Noted: 2022-10-31

## 2022-10-31 LAB
ANION GAP SERPL CALC-SCNC: 10 MEQ/L
BASOPHILS # BLD AUTO: 0.03 X10(3)/MCL (ref 0–0.2)
BASOPHILS NFR BLD AUTO: 0.4 %
BUN SERPL-MCNC: 12.8 MG/DL (ref 9.8–20.1)
CALCIUM SERPL-MCNC: 8.8 MG/DL (ref 8.4–10.2)
CHLORIDE SERPL-SCNC: 105 MMOL/L (ref 98–107)
CO2 SERPL-SCNC: 21 MMOL/L (ref 22–29)
CREAT SERPL-MCNC: 1.14 MG/DL (ref 0.55–1.02)
CREAT/UREA NIT SERPL: 11
EOSINOPHIL # BLD AUTO: 0.22 X10(3)/MCL (ref 0–0.9)
EOSINOPHIL NFR BLD AUTO: 3.2 %
ERYTHROCYTE [DISTWIDTH] IN BLOOD BY AUTOMATED COUNT: 14.3 % (ref 11.5–17)
GFR SERPLBLD CREATININE-BSD FMLA CKD-EPI: 56 MLS/MIN/1.73/M2
GLUCOSE SERPL-MCNC: 249 MG/DL (ref 74–100)
GLUCOSE SERPL-MCNC: 275 MG/DL (ref 70–110)
HCT VFR BLD AUTO: 26.9 % (ref 37–47)
HGB BLD-MCNC: 8.7 GM/DL (ref 12–16)
IMM GRANULOCYTES # BLD AUTO: 0.03 X10(3)/MCL (ref 0–0.04)
IMM GRANULOCYTES NFR BLD AUTO: 0.4 %
LYMPHOCYTES # BLD AUTO: 2.62 X10(3)/MCL (ref 0.6–4.6)
LYMPHOCYTES NFR BLD AUTO: 37.8 %
MCH RBC QN AUTO: 28.5 PG (ref 27–31)
MCHC RBC AUTO-ENTMCNC: 32.3 MG/DL (ref 33–36)
MCV RBC AUTO: 88.2 FL (ref 80–94)
MONOCYTES # BLD AUTO: 0.68 X10(3)/MCL (ref 0.1–1.3)
MONOCYTES NFR BLD AUTO: 9.8 %
NEUTROPHILS # BLD AUTO: 3.4 X10(3)/MCL (ref 2.1–9.2)
NEUTROPHILS NFR BLD AUTO: 48.4 %
NRBC BLD AUTO-RTO: 0 %
PLATELET # BLD AUTO: 414 X10(3)/MCL (ref 130–400)
PMV BLD AUTO: 8.9 FL (ref 7.4–10.4)
POCT GLUCOSE: 230 MG/DL (ref 70–110)
POCT GLUCOSE: 262 MG/DL (ref 70–110)
POCT GLUCOSE: 275 MG/DL (ref 70–110)
POTASSIUM SERPL-SCNC: 5.1 MMOL/L (ref 3.5–5.1)
RBC # BLD AUTO: 3.05 X10(6)/MCL (ref 4.2–5.4)
SODIUM SERPL-SCNC: 136 MMOL/L (ref 136–145)
WBC # SPEC AUTO: 6.9 X10(3)/MCL (ref 4.5–11.5)

## 2022-10-31 PROCEDURE — 85025 COMPLETE CBC W/AUTO DIFF WBC: CPT | Performed by: HOSPITALIST

## 2022-10-31 PROCEDURE — 36415 COLL VENOUS BLD VENIPUNCTURE: CPT | Performed by: HOSPITALIST

## 2022-10-31 PROCEDURE — 25000003 PHARM REV CODE 250: Performed by: HOSPITALIST

## 2022-10-31 PROCEDURE — 63600175 PHARM REV CODE 636 W HCPCS: Performed by: PHYSICIAN ASSISTANT

## 2022-10-31 PROCEDURE — 80048 BASIC METABOLIC PNL TOTAL CA: CPT | Performed by: HOSPITALIST

## 2022-10-31 RX ORDER — CIPROFLOXACIN 500 MG/1
500 TABLET ORAL EVERY 12 HOURS
Qty: 14 TABLET | Refills: 0 | Status: SHIPPED | OUTPATIENT
Start: 2022-10-31 | End: 2022-11-07

## 2022-10-31 RX ORDER — ZOLPIDEM TARTRATE 5 MG/1
5 TABLET ORAL NIGHTLY PRN
Qty: 7 TABLET | Refills: 0 | Status: SHIPPED | OUTPATIENT
Start: 2022-10-31 | End: 2022-11-09 | Stop reason: SDUPTHER

## 2022-10-31 RX ORDER — METRONIDAZOLE 500 MG/1
500 TABLET ORAL EVERY 8 HOURS
Qty: 21 TABLET | Refills: 0 | Status: SHIPPED | OUTPATIENT
Start: 2022-10-31 | End: 2022-11-07

## 2022-10-31 RX ADMIN — CIPROFLOXACIN 500 MG: 500 TABLET, FILM COATED ORAL at 08:10

## 2022-10-31 RX ADMIN — METRONIDAZOLE 500 MG: 500 TABLET ORAL at 05:10

## 2022-10-31 RX ADMIN — INSULIN ASPART 4 UNITS: 100 INJECTION, SOLUTION INTRAVENOUS; SUBCUTANEOUS at 05:10

## 2022-10-31 RX ADMIN — Medication 1 EACH: at 08:10

## 2022-10-31 RX ADMIN — LEVOTHYROXINE SODIUM 100 MCG: 100 TABLET ORAL at 08:10

## 2022-10-31 RX ADMIN — INSULIN ASPART 3 UNITS: 100 INJECTION, SOLUTION INTRAVENOUS; SUBCUTANEOUS at 12:10

## 2022-10-31 RX ADMIN — APIXABAN 5 MG: 5 TABLET, FILM COATED ORAL at 08:10

## 2022-10-31 RX ADMIN — DULOXETINE 30 MG: 30 CAPSULE, DELAYED RELEASE ORAL at 08:10

## 2022-10-31 RX ADMIN — METOPROLOL TARTRATE 50 MG: 50 TABLET, FILM COATED ORAL at 08:10

## 2022-10-31 RX ADMIN — VERAPAMIL HYDROCHLORIDE 40 MG: 40 TABLET, FILM COATED ORAL at 08:10

## 2022-10-31 NOTE — DISCHARGE SUMMARY
[facility] Medicine Discharge Summary    Admit Date: 10/28/2022  Discharge Date and Time: 10/31/26141:23 AM  Admitting Physician: Hospitalist team   Discharging Physician: Gabo Mcgee MD.  Primary Care Physician: Aquilino Weinberg MD      Discharge Diagnoses:  Severe sepsis  diverticulitis  acute kidney injury   Hyperosmolar hyperglycemia syndrome  Electrolyte imbalance  anion gap metabolic acidosis, compensated  History of: Essential hypertension, hypothyroidism, type 2 diabetes mellitus, atrial fibrillation on Eliquis, major depressive disorder, hyperlipidemia     Hospital Course:   57 y.o. female who  has a past medical history of Clotting disorder, Coronary artery disease, Diabetes mellitus, type 2, DVT (deep venous thrombosis), Heart attack, Hyperlipidemia, Hypertension, Hypothyroidism, and Thyroid disease.. The patient presented to [facility] on 10/28/2022      57-year-old female with past medical history of diabetes, history of DVT on Eliquis, hyperthyroidism, hypertension, and diverticulosis presents to the ER with worsening abdominal pain over the last few days.  Denies any fevers at home that she is also little groggy from it and pain medicine earlier.  Most of history is taken from ER physician note.  She does endorse some midepigastric pain and also on the right lower quadrant.  This seems more diffuse on palpation.  She has been having nausea and vomiting for the last 5 days and also with some mucousy diarrhea.  No bleeding noted. in the emergency room her vital signs were significant for tachypnea and borderline low blood pressure.  She was placed on 4 L nasal cannula vital see report of hypoxemia this.  Her white count was significantly elevated at 17.  She does have some anemia but this appears to be chronic.  Potassium was 5.6 and sodium was 128.  Bicarb 17.  Creatinine was up to 3.32.  I do not have a baseline on her.  PH was compensated at 7.38.  Glucose is one  hundred ninety-three.  CT of the abdomen reveals scattered sigmoid diverticulosis with suspected early changes of diverticulitis.  No suggestion of disruption or perforation.  Also some possible colitis included.  The hospitalist group was asked to admit for further workup and treatment.   She was started empirically on ciprofloxacin and Flagyl.  She had significant improvement in her symptoms over the next 24 hours.  Diet was advanced.  Diarrhea slowed down.  On day of discharge she is tolerating p.o..  She already has her backpack and states she is feeling much better and ready to go home.  We discussed advancing her diet slowly at home.  She is also requesting a prescription for Ambien to help her rest at night.  Scratch that give her a week's worth but this will need to be refilled through her PCP.  We also discussed getting a screening colonoscopy this year.  She states that she has had home kit testing prior but cannot remember when her last actual colonoscopy was.  Defer to her PCP on timing.  Will send 7 more days of oral Cipro and Flagyl.  She is not requiring any oral narcotics for pain.    Vitals:  Blood pressure (!) 148/81, pulse 80, temperature 98 °F (36.7 °C), temperature source Oral, resp. rate 18, height 5' (1.524 m), weight 77.1 kg (169 lb 15.6 oz), SpO2 98 %..    Physical Exam:  Awake, Alert, Oriented x 3, No new focal Neurologic deficit, Normal Affect  NC/AT, PERRLA, EOMI  Supple neck, no JVD, No cervical lymphadenopathy  Symmetrical chest, Good air entry bilaterally. No rhonchi, wheezes, crackles appreciated  RRR, No gallop, rub or murmur  +ve Bowel sounds x4, Abd soft and non tender, no rebound, guarding or rigidity  No Cyanosis, cludding or edema, No new rash or bruises    Procedures Performed: No admission procedures for hospital encounter.     Significant Diagnostic Studies: See Full reports for all details  No results displayed because visit has over 200 results.           Microbiology  Results (last 7 days)       Procedure Component Value Units Date/Time    Blood culture x two cultures. Draw prior to antibiotics. [[id number]]  (Normal) Collected: 10/28/22 0257    Order Status: Completed Specimen: Blood from Antecubital, Left Updated: 10/31/22 0403     CULTURE, BLOOD ([facility]) No Growth At 72 Hours    Blood culture x two cultures. Draw prior to antibiotics. [[id number]]  (Normal) Collected: 10/28/22 0257    Order Status: Completed Specimen: Blood from Forearm, Left Updated: 10/31/22 0403     CULTURE, BLOOD ([facility]) No Growth At 72 Hours             X-Ray Chest AP Portable    Result Date: 10/28/2022  EXAMINATION XR CHEST AP PORTABLE CLINICAL HISTORY Sepsis; TECHNIQUE A total of 1 frontal view(s) of the chest. COMPARISON None available at the time of initial interpretation. FINDINGS Lines/tubes/devices: ECG leads overlie the imaged region. Mediastinal postoperative changes are noted.  The cardiac silhouette is prominent in size, with mild central vascular congestion.  The trachea is midline. There is no lobar consolidation, pleural effusion, or pneumothorax. There is no acute osseous or extrathoracic abnormality. IMPRESSION 1. No acute cardiopulmonary process. 2. Nonacute secondary details discussed above. Electronically signed by: Roger Chavis Date:    10/28/2022 Time:    07:10    CT Abdomen Pelvis  Without Contrast    Result Date: 10/28/2022  EXAMINATION CT ABDOMEN PELVIS WITHOUT CONTRAST CLINICAL HISTORY Abdominal pain, acute, nonlocalized;Right lower quadrant, epigastric abdominal pain, nausea vomiting diarrhea x5 days; TECHNIQUE Non-contrast helical-acquisition CT images were obtained and multiplanar reformats accomplished by a CT technologist at a separate workstation, pushed to PACS for physician review. Enteric contrast: none utilized COMPARISON None available at the time of initial interpretation. FINDINGS Images were reviewed in soft tissue, lung, and bone windows. Exam quality: Inherently limited  evaluation of the abdominopelvic organs and vasculature secondary to non-contrast protocol. Lines/tubes: none visualized Cardiopulmonary: Visualized heart chambers are normal volume.  Small layering left pleural effusion is present, with adjacent airspace consolidation likely representing atelectasis.  No significant right pleural or pericardial fluid is identified. Hepatobiliary/Pancreas: No acute or focal liver abnormality.  The gallbladder is not clearly visualized.  No convincing obstructive biliary process.  There is no evidence of expansile pancreatic lesion, ductal dilatation, or peripancreatic inflammatory changes. Spleen: No acute or focal abnormality. Adrenals/: No suspicious adrenal or renal lesion. No radiopaque urolithiasis or evidence of distal obstructive uropathy.  No focal bladder wall thickening or convincing intraluminal abnormality.  No suspicious adnexal findings. Esophagus/GI tract: The included lower esophagus is unremarkable.  No evidence of gastric outlet or small bowel obstruction. Multiple diverticula are seen at the descending and sigmoid colon. Mild inflammatory stranding is noted through this region (series 2, image 69; series 4, image 46), raising concern for mild diverticulitis. The cecum is sub-hepatic in location and shows questionable mild wall thickening, extending to the hepatic flexure, which could represent element of colitis. Peritoneal/Extraperitoneal Spaces: No free fluid or air, and no drainable collections.  Included vascular structures are without evidence of acute or focal abnormality. No pathologic katerin enlargement or necrotic process. Musculoskeletal: No acute process or suspicious focal abnormality.  Degenerative changes are present throughout the included spinal column and bony pelvis. IMPRESSION 1. Scattered sigmoid diverticulosis with suspected early changes of diverticulitis.  No findings to suggest full-thickness mural disruption or pericolonic drainable  "collection. 2. Mild mural prominence of the proximal colon is nonspecific, element of colitis not excluded. 3. Small left pleural effusion with adjacent atelectasis. 4. Additional chronic secondary details discussed above. ========== No significant discrepancy identified in relation to the teleradiology preliminary report. RADIATION DOSE Automated tube current modulation, weight-based exposure dosing, and/or iterative reconstruction technique utilized to reach lowest reasonably achievable exposure rate. DLP: 863 mGy*cm Electronically signed by: Roger Chavis Date:    10/28/2022 Time:    07:03  - pulls last radiology orders        Medication List        START taking these medications      ciprofloxacin HCl 500 MG tablet  Commonly known as: CIPRO  Take 1 tablet (500 mg total) by mouth every 12 (twelve) hours. for 7 days     Lactobacillus rhamnosus GG 5 billion cell packet (PEDS)  Commonly known as: CULTERELLE  Take 1 packet (1 each total) by mouth once daily. for 7 days     metroNIDAZOLE 500 MG tablet  Commonly known as: FLAGYL  Take 1 tablet (500 mg total) by mouth every 8 (eight) hours. for 7 days     zolpidem 5 MG Tab  Commonly known as: AMBIEN  Take 1 tablet (5 mg total) by mouth nightly as needed.            CHANGE how you take these medications      verapamiL 40 MG Tab  Commonly known as: CALAN  What changed: Another medication with the same name was removed. Continue taking this medication, and follow the directions you see here.            CONTINUE taking these medications      atorvastatin 80 MG tablet  Commonly known as: LIPITOR     BD GINA 2ND GEN PEN NEEDLE 32 gauge x 5/32" Ndle  Generic drug: pen needle, diabetic     blood-glucose meter kit  Use as instructed     doxepin 25 MG capsule  Commonly known as: SINEQUAN  Take 1 capsule (25 mg total) by mouth every evening.     DULoxetine 30 MG capsule  Commonly known as: CYMBALTA     ELIQUIS 5 mg Tab  Generic drug: apixaban  Take 1 tablet (5 mg total) by mouth 2 " (two) times a day.     fenofibrate 160 MG Tab     * hydrocortisone 10 MG Tab  Commonly known as: CORTEF     * hydrocortisone 5 MG Tab  Commonly known as: CORTEF     LEVEMIR FLEXTOUCH U-100 INSULN 100 unit/mL (3 mL) Inpn pen  Generic drug: insulin detemir U-100  Inject 100 Units into the skin 2 (two) times daily.     levothyroxine 100 MCG tablet  Commonly known as: SYNTHROID     losartan 100 MG tablet  Commonly known as: COZAAR  Take 1 tablet (100 mg total) by mouth once daily.     metoprolol tartrate 50 MG tablet  Commonly known as: LOPRESSOR  Take 1 tablet (50 mg total) by mouth 2 (two) times daily.     ondansetron 4 MG tablet  Commonly known as: ZOFRAN     VICTOZA 2-DREW 0.6 mg/0.1 mL (18 mg/3 mL) Pnij pen  Generic drug: liraglutide 0.6 mg/0.1 mL (18 mg/3 mL) subq PNIJ           * This list has 2 medication(s) that are the same as other medications prescribed for you. Read the directions carefully, and ask your doctor or other care provider to review them with you.                   Where to Get Your Medications        These medications were sent to [id number] [address] [address]      Phone: [phone]   ciprofloxacin HCl 500 MG tablet  Lactobacillus rhamnosus GG 5 billion cell packet (PEDS)  metroNIDAZOLE 500 MG tablet  zolpidem 5 MG Tab          Explained in detail to the patient about the discharge plan, medications, and follow-up visits. Pt understands and agrees with the treatment plan  Discharged Condition: stable  Diet: cardiac  Disposition: home    Medications Per DC med rec  Activities as tolerated  Follow up with your PCP in 2 wks   For further questions contact hospitalist office    Discharge time 33 minutes    For worsening symptoms, chest pain, shortness of breath, increased abdominal pain, high grade fever, stroke or stroke like symptoms, immediately go to the nearest Emergency Room or call 911 as soon as possible.      Gabo REDDY  Rolf GARCES on 10/31/2022. at 7:23 AM.

## 2022-11-01 ENCOUNTER — PATIENT OUTREACH (OUTPATIENT)
Dept: ADMINISTRATIVE | Facility: CLINIC | Age: 57
End: 2022-11-01
Payer: COMMERCIAL

## 2022-11-02 ENCOUNTER — DOCUMENT SCAN (OUTPATIENT)
Dept: HOME HEALTH SERVICES | Facility: HOSPITAL | Age: 57
End: 2022-11-02
Payer: COMMERCIAL

## 2022-11-02 ENCOUNTER — DOCUMENTATION ONLY (OUTPATIENT)
Dept: ADMINISTRATIVE | Facility: HOSPITAL | Age: 57
End: 2022-11-02
Payer: COMMERCIAL

## 2022-11-02 LAB
BACTERIA BLD CULT: NORMAL
BACTERIA BLD CULT: NORMAL

## 2022-11-03 ENCOUNTER — DOCUMENT SCAN (OUTPATIENT)
Dept: HOME HEALTH SERVICES | Facility: HOSPITAL | Age: 57
End: 2022-11-03
Payer: COMMERCIAL

## 2022-11-07 ENCOUNTER — TELEPHONE (OUTPATIENT)
Dept: FAMILY MEDICINE | Facility: CLINIC | Age: 57
End: 2022-11-07
Payer: COMMERCIAL

## 2022-11-07 NOTE — TELEPHONE ENCOUNTER
----- Message from Maggie Panchal sent at 11/7/2022  2:06 PM CST -----  Regarding: call back  Type:  Needs Medical Advice    Who Called:tiffani  Would the patient rather a call back or a response via [facility]? cb  Best Call Back Number: [phone]  Additional Information: Pt would like a call back has questions about upcoming appt. Pls have nurse call kaylee pt

## 2022-11-09 ENCOUNTER — OFFICE VISIT (OUTPATIENT)
Dept: FAMILY MEDICINE | Facility: CLINIC | Age: 57
End: 2022-11-09
Payer: COMMERCIAL

## 2022-11-09 ENCOUNTER — LAB VISIT (OUTPATIENT)
Dept: LAB | Facility: HOSPITAL | Age: 57
End: 2022-11-09
Attending: FAMILY MEDICINE
Payer: COMMERCIAL

## 2022-11-09 VITALS
HEART RATE: 76 BPM | SYSTOLIC BLOOD PRESSURE: 103 MMHG | WEIGHT: 181.63 LBS | TEMPERATURE: 98 F | BODY MASS INDEX: 35.47 KG/M2 | OXYGEN SATURATION: 98 % | RESPIRATION RATE: 18 BRPM | DIASTOLIC BLOOD PRESSURE: 67 MMHG

## 2022-11-09 DIAGNOSIS — N18.31 ANEMIA DUE TO STAGE 3A CHRONIC KIDNEY DISEASE: ICD-10-CM

## 2022-11-09 DIAGNOSIS — E11.22 TYPE 2 DIABETES MELLITUS WITH STAGE 3A CHRONIC KIDNEY DISEASE, WITH LONG-TERM CURRENT USE OF INSULIN: Chronic | ICD-10-CM

## 2022-11-09 DIAGNOSIS — I10 PRIMARY HYPERTENSION: Chronic | ICD-10-CM

## 2022-11-09 DIAGNOSIS — Z79.4 TYPE 2 DIABETES MELLITUS WITH STAGE 3A CHRONIC KIDNEY DISEASE, WITH LONG-TERM CURRENT USE OF INSULIN: Chronic | ICD-10-CM

## 2022-11-09 DIAGNOSIS — N18.31 TYPE 2 DIABETES MELLITUS WITH STAGE 3A CHRONIC KIDNEY DISEASE, WITH LONG-TERM CURRENT USE OF INSULIN: Chronic | ICD-10-CM

## 2022-11-09 DIAGNOSIS — E11.69 MIXED HYPERLIPIDEMIA DUE TO TYPE 2 DIABETES MELLITUS: Chronic | ICD-10-CM

## 2022-11-09 DIAGNOSIS — I15.2 HYPERTENSION ASSOCIATED WITH TYPE 2 DIABETES MELLITUS: Chronic | ICD-10-CM

## 2022-11-09 DIAGNOSIS — K57.92 DIVERTICULITIS: ICD-10-CM

## 2022-11-09 DIAGNOSIS — Z79.4 TYPE 2 DIABETES MELLITUS WITH HYPERGLYCEMIA, WITH LONG-TERM CURRENT USE OF INSULIN: Chronic | ICD-10-CM

## 2022-11-09 DIAGNOSIS — E44.0 PROTEIN-CALORIE MALNUTRITION, MODERATE: ICD-10-CM

## 2022-11-09 DIAGNOSIS — D63.1 ANEMIA DUE TO STAGE 3A CHRONIC KIDNEY DISEASE: ICD-10-CM

## 2022-11-09 DIAGNOSIS — E78.2 MIXED HYPERLIPIDEMIA: Chronic | ICD-10-CM

## 2022-11-09 DIAGNOSIS — D50.8 IRON DEFICIENCY ANEMIA SECONDARY TO INADEQUATE DIETARY IRON INTAKE: ICD-10-CM

## 2022-11-09 DIAGNOSIS — E11.65 TYPE 2 DIABETES MELLITUS WITH HYPERGLYCEMIA, WITH LONG-TERM CURRENT USE OF INSULIN: Chronic | ICD-10-CM

## 2022-11-09 DIAGNOSIS — Z09 HOSPITAL DISCHARGE FOLLOW-UP: ICD-10-CM

## 2022-11-09 DIAGNOSIS — E11.59 HYPERTENSION ASSOCIATED WITH TYPE 2 DIABETES MELLITUS: Chronic | ICD-10-CM

## 2022-11-09 DIAGNOSIS — E78.2 MIXED HYPERLIPIDEMIA DUE TO TYPE 2 DIABETES MELLITUS: Chronic | ICD-10-CM

## 2022-11-09 DIAGNOSIS — F51.01 PRIMARY INSOMNIA: ICD-10-CM

## 2022-11-09 DIAGNOSIS — K57.90 DIVERTICULOSIS: ICD-10-CM

## 2022-11-09 DIAGNOSIS — Z09 HOSPITAL DISCHARGE FOLLOW-UP: Primary | ICD-10-CM

## 2022-11-09 LAB
ALBUMIN SERPL-MCNC: 2.9 GM/DL (ref 3.5–5)
ALBUMIN/GLOB SERPL: 0.7 RATIO (ref 1.1–2)
ALP SERPL-CCNC: 101 UNIT/L (ref 40–150)
ALT SERPL-CCNC: 10 UNIT/L (ref 0–55)
AST SERPL-CCNC: 13 UNIT/L (ref 5–34)
BASOPHILS # BLD AUTO: 0.03 X10(3)/MCL (ref 0–0.2)
BASOPHILS NFR BLD AUTO: 0.3 %
BILIRUBIN DIRECT+TOT PNL SERPL-MCNC: 0.5 MG/DL
BUN SERPL-MCNC: 22.1 MG/DL (ref 9.8–20.1)
CALCIUM SERPL-MCNC: 9.8 MG/DL (ref 8.4–10.2)
CHLORIDE SERPL-SCNC: 99 MMOL/L (ref 98–107)
CO2 SERPL-SCNC: 25 MMOL/L (ref 22–29)
CREAT SERPL-MCNC: 2.37 MG/DL (ref 0.55–1.02)
EOSINOPHIL # BLD AUTO: 0.17 X10(3)/MCL (ref 0–0.9)
EOSINOPHIL NFR BLD AUTO: 1.7 %
ERYTHROCYTE [DISTWIDTH] IN BLOOD BY AUTOMATED COUNT: 14.8 % (ref 11.5–17)
EST. AVERAGE GLUCOSE BLD GHB EST-MCNC: 194.4 MG/DL
GFR SERPLBLD CREATININE-BSD FMLA CKD-EPI: 23 MLS/MIN/1.73/M2
GLOBULIN SER-MCNC: 4.2 GM/DL (ref 2.4–3.5)
GLUCOSE SERPL-MCNC: 330 MG/DL (ref 74–100)
HBA1C MFR BLD: 8.4 %
HCT VFR BLD AUTO: 32.7 % (ref 37–47)
HGB BLD-MCNC: 10.5 GM/DL (ref 12–16)
IMM GRANULOCYTES # BLD AUTO: 0.02 X10(3)/MCL (ref 0–0.04)
IMM GRANULOCYTES NFR BLD AUTO: 0.2 %
LYMPHOCYTES # BLD AUTO: 2.65 X10(3)/MCL (ref 0.6–4.6)
LYMPHOCYTES NFR BLD AUTO: 27.1 %
MCH RBC QN AUTO: 28.5 PG (ref 27–31)
MCHC RBC AUTO-ENTMCNC: 32.1 MG/DL (ref 33–36)
MCV RBC AUTO: 88.6 FL (ref 80–94)
MONOCYTES # BLD AUTO: 1.04 X10(3)/MCL (ref 0.1–1.3)
MONOCYTES NFR BLD AUTO: 10.6 %
NEUTROPHILS # BLD AUTO: 5.9 X10(3)/MCL (ref 2.1–9.2)
NEUTROPHILS NFR BLD AUTO: 60.1 %
NRBC BLD AUTO-RTO: 0.2 %
PLATELET # BLD AUTO: 504 X10(3)/MCL (ref 130–400)
PMV BLD AUTO: 8.7 FL (ref 7.4–10.4)
POTASSIUM SERPL-SCNC: 5.4 MMOL/L (ref 3.5–5.1)
PROT SERPL-MCNC: 7.1 GM/DL (ref 6.4–8.3)
RBC # BLD AUTO: 3.69 X10(6)/MCL (ref 4.2–5.4)
SODIUM SERPL-SCNC: 132 MMOL/L (ref 136–145)
WBC # SPEC AUTO: 9.8 X10(3)/MCL (ref 4.5–11.5)

## 2022-11-09 PROCEDURE — 80053 COMPREHEN METABOLIC PANEL: CPT

## 2022-11-09 PROCEDURE — 85025 COMPLETE CBC W/AUTO DIFF WBC: CPT

## 2022-11-09 PROCEDURE — 99495 TCM SERVICES (MODERATE COMPLEXITY): ICD-10-PCS | Mod: ,,, | Performed by: FAMILY MEDICINE

## 2022-11-09 PROCEDURE — 36415 COLL VENOUS BLD VENIPUNCTURE: CPT

## 2022-11-09 PROCEDURE — 99495 TRANSJ CARE MGMT MOD F2F 14D: CPT | Mod: ,,, | Performed by: FAMILY MEDICINE

## 2022-11-09 PROCEDURE — 83036 HEMOGLOBIN GLYCOSYLATED A1C: CPT

## 2022-11-09 RX ORDER — ONDANSETRON 4 MG/1
4 TABLET, ORALLY DISINTEGRATING ORAL EVERY 8 HOURS PRN
COMMUNITY
End: 2023-09-18 | Stop reason: SDUPTHER

## 2022-11-09 RX ORDER — ZOLPIDEM TARTRATE 10 MG/1
10 TABLET ORAL NIGHTLY
Qty: 7 TABLET | Refills: 0 | Status: SHIPPED | OUTPATIENT
Start: 2022-11-09 | End: 2022-11-14 | Stop reason: SDUPTHER

## 2022-11-09 NOTE — PROGRESS NOTES
Transitional Care Note    Family and/or Caretaker present at visit?  Yes.  Diagnostic tests reviewed/disposition: I have reviewed all completed as well as pending diagnostic tests at the time of discharge.  Disease/illness education: yes  Home health/community services discussion/referrals: Patient does not have home health established from hospital visit.  They do not need home health.  If needed, we will set up home health for the patient.   Establishment or re-establishment of referral orders for community resources: No other necessary community resources.   Discussion with other health care providers: No discussion with other health care providers necessary.       Subjective:      Patient ID: Cleopatra Campos is a 57 y.o. female.    Chief Complaint: Hospital Follow Up (Diverticulitis 10/28/2022-10/31/2022)    Hospitalized for diverticulitis with exacerbation of brittle diabetes, dehydration, ARF, electrolyte imbalances.     Here today for hospital follow-up    Denies acute complaints. Denies acute needs.      Problem List Items Addressed This Visit       Primary hypertension (Chronic)    Relevant Orders    Hemoglobin A1C    Comprehensive Metabolic Panel    Mixed hyperlipidemia (Chronic)    Relevant Orders    Comprehensive Metabolic Panel    Type 2 diabetes mellitus with hyperglycemia (Chronic)    Relevant Orders    Hemoglobin A1C    Comprehensive Metabolic Panel    Type 2 diabetes mellitus with stage 3a chronic kidney disease (Chronic)    Relevant Orders    Hemoglobin A1C    Comprehensive Metabolic Panel    Mixed hyperlipidemia due to type 2 diabetes mellitus (Chronic)    Relevant Orders    Hemoglobin A1C    Comprehensive Metabolic Panel    Hypertension associated with type 2 diabetes mellitus (Chronic)    Relevant Orders    Hemoglobin A1C    Comprehensive Metabolic Panel    Hospital discharge follow-up - Primary    Relevant Orders    Hemoglobin A1C    Comprehensive Metabolic Panel    Ambulatory referral/consult  to Gastroenterology    Diverticulitis    Relevant Orders    CBC Auto Differential    Ambulatory referral/consult to Gastroenterology     Other Visit Diagnoses       Diverticulosis        Relevant Orders    Ambulatory referral/consult to Gastroenterology    Anemia due to stage 3a chronic kidney disease        Relevant Orders    CBC Auto Differential    Iron deficiency anemia secondary to inadequate dietary iron intake        Relevant Orders    CBC Auto Differential    Protein-calorie malnutrition, moderate        Relevant Orders    Comprehensive Metabolic Panel    BMI 35.0-35.9,adult        Primary insomnia        Relevant Medications    zolpidem (AMBIEN) 10 mg Tab            The patient's Health Maintenance was reviewed and the following appears to be due:   Health Maintenance Due   Topic Date Due    Hepatitis C Screening  Never done    Pneumococcal Vaccines (Age 0-64) (1 - PCV) Never done    Eye Exam  Never done    HIV Screening  Never done    TETANUS VACCINE  Never done    Mammogram  Never done    Shingles Vaccine (1 of 2) Never done    COVID-19 Vaccine (2 - Mixed Product series) 2021    Influenza Vaccine (1) Never done       Past Medical History:  Past Medical History:   Diagnosis Date    Clotting disorder     Coronary artery disease     Diabetes mellitus, type 2     DVT (deep venous thrombosis)     Heart attack     Hyperlipidemia     Hypertension     Hypothyroidism     Thyroid disease      Past Surgical History:   Procedure Laterality Date    APPENDECTOMY      CARDIAC CATHETERIZATION      CARPAL TUNNEL RELEASE       SECTION      CORONARY ARTERY BYPASS GRAFT      HYSTERECTOMY      ROTATOR CUFF REPAIR Right     TRIGGER FINGER RELEASE       Review of patient's allergies indicates:   Allergen Reactions    Enoxaparin Other (See Comments)     Heparin induced thrombocytopenia     Meperidine Swelling    Meperidine hcl Swelling    Heparin      Current Outpatient Medications on File Prior to Visit  "  Medication Sig Dispense Refill    atorvastatin (LIPITOR) 80 MG tablet Take 80 mg by mouth once daily.      BD GINA 2ND GEN PEN NEEDLE 32 gauge x 5/32" Ndle       blood-glucose meter kit Use as instructed 1 each 0    doxepin (SINEQUAN) 25 MG capsule Take 1 capsule (25 mg total) by mouth every evening. 30 capsule 11    DULoxetine (CYMBALTA) 30 MG capsule Take 30 mg by mouth once daily.      ELIQUIS 5 mg Tab Take 1 tablet (5 mg total) by mouth 2 (two) times a day. 180 tablet 3    fenofibrate 160 MG Tab Take 160 mg by mouth once daily.      hydrocortisone (CORTEF) 10 MG Tab Take 10 mg by mouth once daily. At 3 pm      hydrocortisone (CORTEF) 5 MG Tab Take 15 mg by mouth once daily. With breakfast.      insulin detemir U-100 (LEVEMIR FLEXTOUCH U-100 INSULN) 100 unit/mL (3 mL) InPn pen Inject 100 Units into the skin 2 (two) times daily. 60 mL 0    levothyroxine (SYNTHROID) 100 MCG tablet Take 100 mcg by mouth once daily.      liraglutide 0.6 mg/0.1 mL, 18 mg/3 mL, subq PNIJ (VICTOZA 2-DREW) 0.6 mg/0.1 mL (18 mg/3 mL) PnIj pen Inject 1.8 mg into the skin once daily.      losartan (COZAAR) 100 MG tablet Take 1 tablet (100 mg total) by mouth once daily. 90 tablet 3    metoprolol tartrate (LOPRESSOR) 50 MG tablet Take 1 tablet (50 mg total) by mouth 2 (two) times daily. 180 tablet 3    ondansetron (ZOFRAN) 4 MG tablet Take 4 mg by mouth every 8 (eight) hours as needed.      ondansetron (ZOFRAN-ODT) 4 MG TbDL Take 4 mg by mouth every 8 (eight) hours as needed.      verapamiL (CALAN) 40 MG Tab Take 40 mg by mouth 3 (three) times daily.      [DISCONTINUED] zolpidem (AMBIEN) 5 MG Tab Take 1 tablet (5 mg total) by mouth nightly as needed. 7 tablet 0     No current facility-administered medications on file prior to visit.     Social History     Socioeconomic History    Marital status:     Number of children: 3   Occupational History    Occupation: Retired    Tobacco Use    Smoking status: Former    Smokeless tobacco: Never "    Tobacco comments:     Pt quit as of recent hospitalization for CABG/MI.    Substance and Sexual Activity    Alcohol use: Not Currently    Drug use: Never     Family History   Problem Relation Age of Onset    Diabetes Mother     Cancer Father        Review of Systems   Constitutional:  Positive for fatigue.   Neurological:  Positive for weakness.   All other systems reviewed and are negative.    Objective:   /67 (BP Location: Left arm, Patient Position: Sitting, BP Method: Large (Automatic))   Pulse 76   Temp 97.5 °F (36.4 °C) (Oral)   Resp 18   Wt 82.4 kg (181 lb 9.6 oz)   SpO2 98%   BMI 35.47 kg/m²     Physical Exam  Vitals and nursing note reviewed.   Constitutional:       General: She is awake.      Appearance: Normal appearance. She is well-developed and well-groomed. She is morbidly obese. She is ill-appearing.   HENT:      Head: Normocephalic and atraumatic.      Right Ear: Tympanic membrane, ear canal and external ear normal.      Left Ear: Tympanic membrane, ear canal and external ear normal.      Nose: Nose normal.      Mouth/Throat:      Mouth: Mucous membranes are moist.      Pharynx: Oropharynx is clear.   Eyes:      Extraocular Movements: Extraocular movements intact.      Conjunctiva/sclera: Conjunctivae normal.      Pupils: Pupils are equal, round, and reactive to light.      Comments: Sunken eyes   Cardiovascular:      Rate and Rhythm: Normal rate and regular rhythm.      Pulses: Normal pulses.      Heart sounds: Normal heart sounds.   Pulmonary:      Effort: Pulmonary effort is normal.      Breath sounds: Normal breath sounds.   Abdominal:      General: Abdomen is flat. Bowel sounds are normal.      Palpations: Abdomen is soft.   Musculoskeletal:         General: Normal range of motion.      Cervical back: Normal range of motion and neck supple.   Skin:     General: Skin is warm and dry.      Capillary Refill: Capillary refill takes less than 2 seconds.      Coloration: Skin is pale.    Neurological:      General: No focal deficit present.      Mental Status: She is alert and oriented to person, place, and time. Mental status is at baseline.   Psychiatric:         Mood and Affect: Mood normal.         Behavior: Behavior normal. Behavior is cooperative.         Thought Content: Thought content normal.         Judgment: Judgment normal.       Procedures     No results displayed because visit has over 200 results.      Historical on 09/17/2022   Component Date Value Ref Range Status    Blood Urea Nitrogen 08/10/2020 21 (H)  7 - 18 mg/dL Final    Creatinine 08/10/2020 0.70  0.6 - 1.3 mg/dL Final    Glucose Level 08/10/2020 252 (H)  74 - 106 mg/dL Final    HDL Cholesterol 08/10/2020 39  35 - 60 mg/dL Final    Cholesterol Total 08/10/2020 212 (H)  <200 mg/dL Final    Triglyceride 08/10/2020 499 (H)  30 - 150 mg/dL Final   Lab Requisition on 09/09/2022   Component Date Value Ref Range Status    Phosphorus Level 09/09/2022 6.0 (H)  2.3 - 4.7 mg/dL Final    Sodium Level 09/09/2022 135 (L)  136 - 145 mmol/L Final    Potassium Level 09/09/2022 5.5 (H)  3.5 - 5.1 mmol/L Final    Chloride 09/09/2022 100  98 - 107 mmol/L Final    Carbon Dioxide 09/09/2022 27  22 - 29 mmol/L Final    Glucose Level 09/09/2022 197 (H)  74 - 100 mg/dL Final    Blood Urea Nitrogen 09/09/2022 41.0 (H)  9.8 - 20.1 mg/dL Final    Creatinine 09/09/2022 3.36 (H)  0.55 - 1.02 mg/dL Final    Calcium Level Total 09/09/2022 8.9  8.4 - 10.2 mg/dL Final    Protein Total 09/09/2022 6.5  6.4 - 8.3 gm/dL Final    Albumin Level 09/09/2022 2.8 (L)  3.5 - 5.0 gm/dL Final    Globulin 09/09/2022 3.7 (H)  2.4 - 3.5 gm/dL Final    Albumin/Globulin Ratio 09/09/2022 0.8 (L)  1.1 - 2.0 ratio Final    Bilirubin Total 09/09/2022 0.4  <=1.5 mg/dL Final    Alkaline Phosphatase 09/09/2022 298 (H)  40 - 150 unit/L Final    Alanine Aminotransferase 09/09/2022 15  0 - 55 unit/L Final    Aspartate Aminotransferase 09/09/2022 14  5 - 34 unit/L Final    eGFR  09/09/2022 15  mls/min/1.73/m2 Final    WBC 09/09/2022 9.9  4.5 - 11.5 x10(3)/mcL Final    RBC 09/09/2022 3.43 (L)  4.20 - 5.40 x10(6)/mcL Final    Hgb 09/09/2022 10.0 (L)  12.0 - 16.0 gm/dL Final    Hct 09/09/2022 31.7 (L)  37.0 - 47.0 % Final    MCV 09/09/2022 92.4  80.0 - 94.0 fL Final    MCH 09/09/2022 29.2  27.0 - 31.0 pg Final    MCHC 09/09/2022 31.5 (L)  33.0 - 36.0 mg/dL Final    RDW 09/09/2022 14.6  11.5 - 17.0 % Final    Platelet 09/09/2022 351  130 - 400 x10(3)/mcL Final    MPV 09/09/2022 9.6  7.4 - 10.4 fL Final    Neut % 09/09/2022 55.4  % Final    Lymph % 09/09/2022 34.1  % Final    Mono % 09/09/2022 8.8  % Final    Eos % 09/09/2022 1.1  % Final    Basophil % 09/09/2022 0.3  % Final    Lymph # 09/09/2022 3.36  0.6 - 4.6 x10(3)/mcL Final    Neut # 09/09/2022 5.5  2.1 - 9.2 x10(3)/mcL Final    Mono # 09/09/2022 0.87  0.1 - 1.3 x10(3)/mcL Final    Eos # 09/09/2022 0.11  0 - 0.9 x10(3)/mcL Final    Baso # 09/09/2022 0.03  0 - 0.2 x10(3)/mcL Final    IG# 09/09/2022 0.03  0 - 0.04 x10(3)/mcL Final    IG% 09/09/2022 0.3  % Final    NRBC% 09/09/2022 0.0  % Final   Patient Outreach on 08/02/2022   Component Date Value Ref Range Status    Cologuard Result 01/09/2020 negative   Final-Edited       X-Ray Chest AP Portable  EXAMINATION  XR CHEST AP PORTABLE    CLINICAL HISTORY  Sepsis;    TECHNIQUE  A total of 1 frontal view(s) of the chest.    COMPARISON  None available at the time of initial interpretation.    FINDINGS  Lines/tubes/devices: ECG leads overlie the imaged region.    Mediastinal postoperative changes are noted.  The cardiac silhouette is prominent in size, with mild central vascular congestion.  The trachea is midline. There is no lobar consolidation, pleural effusion, or pneumothorax.    There is no acute osseous or extrathoracic abnormality.    IMPRESSION  1. No acute cardiopulmonary process.  2. Nonacute secondary details discussed above.    Electronically signed by: Roger  Chavis  Date:    10/28/2022  Time:    07:10  CT Abdomen Pelvis  Without Contrast  EXAMINATION  CT ABDOMEN PELVIS WITHOUT CONTRAST    CLINICAL HISTORY  Abdominal pain, acute, nonlocalized;Right lower quadrant, epigastric abdominal pain, nausea vomiting diarrhea x5 days;    TECHNIQUE  Non-contrast helical-acquisition CT images were obtained and multiplanar reformats accomplished by a CT technologist at a separate workstation, pushed to PACS for physician review.    Enteric contrast: none utilized    COMPARISON  None available at the time of initial interpretation.    FINDINGS  Images were reviewed in soft tissue, lung, and bone windows.    Exam quality: Inherently limited evaluation of the abdominopelvic organs and vasculature secondary to non-contrast protocol.    Lines/tubes: none visualized    Cardiopulmonary: Visualized heart chambers are normal volume.  Small layering left pleural effusion is present, with adjacent airspace consolidation likely representing atelectasis.  No significant right pleural or pericardial fluid is identified.    Hepatobiliary/Pancreas: No acute or focal liver abnormality.  The gallbladder is not clearly visualized.  No convincing obstructive biliary process.  There is no evidence of expansile pancreatic lesion, ductal dilatation, or peripancreatic inflammatory changes.    Spleen: No acute or focal abnormality.    Adrenals/: No suspicious adrenal or renal lesion. No radiopaque urolithiasis or evidence of distal obstructive uropathy.  No focal bladder wall thickening or convincing intraluminal abnormality.  No suspicious adnexal findings.    Esophagus/GI tract: The included lower esophagus is unremarkable.  No evidence of gastric outlet or small bowel obstruction. Multiple diverticula are seen at the descending and sigmoid colon. Mild inflammatory stranding is noted through this region (series 2, image 69; series 4, image 46), raising concern for mild diverticulitis. The cecum is  sub-hepatic in location and shows questionable mild wall thickening, extending to the hepatic flexure, which could represent element of colitis.    Peritoneal/Extraperitoneal Spaces: No free fluid or air, and no drainable collections.  Included vascular structures are without evidence of acute or focal abnormality. No pathologic katerin enlargement or necrotic process.    Musculoskeletal: No acute process or suspicious focal abnormality.  Degenerative changes are present throughout the included spinal column and bony pelvis.    IMPRESSION  1. Scattered sigmoid diverticulosis with suspected early changes of diverticulitis.  No findings to suggest full-thickness mural disruption or pericolonic drainable collection.  2. Mild mural prominence of the proximal colon is nonspecific, element of colitis not excluded.  3. Small left pleural effusion with adjacent atelectasis.  4. Additional chronic secondary details discussed above.  ==========    No significant discrepancy identified in relation to the teleradiology preliminary report.    RADIATION DOSE  Automated tube current modulation, weight-based exposure dosing, and/or iterative reconstruction technique utilized to reach lowest reasonably achievable exposure rate.    DLP: 863 mGy*cm    Electronically signed by: Roger Chavis  Date:    10/28/2022  Time:    07:03       Assessment:     1. Hospital discharge follow-up    2. Diverticulitis    3. Diverticulosis    4. Anemia due to stage 3a chronic kidney disease    5. Iron deficiency anemia secondary to inadequate dietary iron intake    6. Protein-calorie malnutrition, moderate    7. Type 2 diabetes mellitus with hyperglycemia, with long-term current use of insulin    8. Type 2 diabetes mellitus with stage 3a chronic kidney disease, with long-term current use of insulin    9. Hypertension associated with type 2 diabetes mellitus    10. Mixed hyperlipidemia due to type 2 diabetes mellitus    11. Primary hypertension    12. Mixed  hyperlipidemia    13. BMI 35.0-35.9,adult    14. Primary insomnia      Plan:   I have changed Cleopatra Campos's zolpidem. I am also having her maintain her LEVEMIR FLEXTOUCH U-100 INSULN, atorvastatin, DULoxetine, fenofibrate, hydrocortisone, hydrocortisone, levothyroxine, ondansetron, BD GINA 2ND GEN PEN NEEDLE, VICTOZA 2-DREW, blood-glucose meter, ELIQUIS, losartan, metoprolol tartrate, doxepin, verapamiL, and ondansetron.  Problem List Items Addressed This Visit       Primary hypertension (Chronic)    Relevant Orders    Hemoglobin A1C    Comprehensive Metabolic Panel    Mixed hyperlipidemia (Chronic)    Relevant Orders    Comprehensive Metabolic Panel    Type 2 diabetes mellitus with hyperglycemia (Chronic)    Relevant Orders    Hemoglobin A1C    Comprehensive Metabolic Panel    Type 2 diabetes mellitus with stage 3a chronic kidney disease (Chronic)    Relevant Orders    Hemoglobin A1C    Comprehensive Metabolic Panel    Mixed hyperlipidemia due to type 2 diabetes mellitus (Chronic)    Relevant Orders    Hemoglobin A1C    Comprehensive Metabolic Panel    Hypertension associated with type 2 diabetes mellitus (Chronic)    Relevant Orders    Hemoglobin A1C    Comprehensive Metabolic Panel    Hospital discharge follow-up - Primary    Relevant Orders    Hemoglobin A1C    Comprehensive Metabolic Panel    Ambulatory referral/consult to Gastroenterology    Diverticulitis    Relevant Orders    CBC Auto Differential    Ambulatory referral/consult to Gastroenterology     Other Visit Diagnoses       Diverticulosis        Relevant Orders    Ambulatory referral/consult to Gastroenterology    Anemia due to stage 3a chronic kidney disease        Relevant Orders    CBC Auto Differential    Iron deficiency anemia secondary to inadequate dietary iron intake        Relevant Orders    CBC Auto Differential    Protein-calorie malnutrition, moderate        Relevant Orders    Comprehensive Metabolic Panel    BMI 35.0-35.9,adult         Primary insomnia        Relevant Medications    zolpidem (AMBIEN) 10 mg Tab          Follow up for as previously scheduled.    Cleopatra was seen today for hospital follow up.    Diagnoses and all orders for this visit:    Hospital discharge follow-up  -     Hemoglobin A1C; Future  -     Comprehensive Metabolic Panel; Future  -     Ambulatory referral/consult to Gastroenterology; Future  - Stable since DC, has these acute needs    Diverticulitis  -     CBC Auto Differential; Future  -     Ambulatory referral/consult to Gastroenterology; Future  - Has completed abx    Diverticulosis  -     Ambulatory referral/consult to Gastroenterology; Future  - See above    Anemia due to stage 3a chronic kidney disease  -     CBC Auto Differential; Future  - Needs to have surveillance labs as ordered to assess stability after DC from hospitalization    Iron deficiency anemia secondary to inadequate dietary iron intake  -     CBC Auto Differential; Future  - Surveillance labs ordered    Protein-calorie malnutrition, moderate  -     Comprehensive Metabolic Panel; Future  - Surveillance labs ordered to reassess after DC from hospital    Type 2 diabetes mellitus with hyperglycemia, with long-term current use of insulin  -     Hemoglobin A1C; Future  -     Comprehensive Metabolic Panel; Future  - Surveillance labs ordered, additional POC (Rx changes, assessment of control vs uncontrolled, etc) pending results    Type 2 diabetes mellitus with stage 3a chronic kidney disease, with long-term current use of insulin  -     Hemoglobin A1C; Future  -     Comprehensive Metabolic Panel; Future  - Surveillance labs ordered, additional POC (Rx changes, assessment of control vs uncontrolled, etc) pending results    Hypertension associated with type 2 diabetes mellitus  -     Hemoglobin A1C; Future  -     Comprehensive Metabolic Panel; Future  - Surveillance labs ordered, additional POC (Rx changes, assessment of control vs uncontrolled, etc) pending  results    Mixed hyperlipidemia due to type 2 diabetes mellitus  -     Hemoglobin A1C; Future  -     Comprehensive Metabolic Panel; Future  - Surveillance labs ordered, additional POC (Rx changes, assessment of control vs uncontrolled, etc) pending results    Primary hypertension  -     Hemoglobin A1C; Future  -     Comprehensive Metabolic Panel; Future  - Surveillance labs ordered, additional POC (Rx changes, assessment of control vs uncontrolled, etc) pending results    Mixed hyperlipidemia  -     Comprehensive Metabolic Panel; Future  - Surveillance labs ordered, additional POC (Rx changes, assessment of control vs uncontrolled, etc) pending results    BMI 35.0-35.9,adult  Documented for chart completeness and HCC    Primary insomnia  -     zolpidem (AMBIEN) 10 mg Tab; Take 1 tablet (10 mg total) by mouth every evening. for 7 days  - New condition, identified in hospital, try new dose of Rx, reassess for efficacy in 1 week    Medications Ordered This Encounter   Medications    zolpidem (AMBIEN) 10 mg Tab     Sig: Take 1 tablet (10 mg total) by mouth every evening. for 7 days     Dispense:  7 tablet     Refill:  0     [unfilled]  Orders Placed This Encounter   Procedures    Hemoglobin A1C     Standing Status:   Future     Standing Expiration Date:   1/8/2024    CBC Auto Differential     Standing Status:   Future     Standing Expiration Date:   1/8/2024    Comprehensive Metabolic Panel     Standing Status:   Future     Standing Expiration Date:   1/8/2024    Ambulatory referral/consult to Gastroenterology     Standing Status:   Future     Standing Expiration Date:   12/9/2023     Referral Priority:   Routine     Referral Type:   Consultation     Referral Reason:   Specialty Services Required     Requested Specialty:   Gastroenterology     Number of Visits Requested:   1       Medication List with Changes/Refills   Current Medications    ATORVASTATIN (LIPITOR) 80 MG TABLET    Take 80 mg by mouth once daily.    BD  "GINA 2ND GEN PEN NEEDLE 32 GAUGE X 5/32" NDLE        BLOOD-GLUCOSE METER KIT    Use as instructed    DOXEPIN (SINEQUAN) 25 MG CAPSULE    Take 1 capsule (25 mg total) by mouth every evening.    DULOXETINE (CYMBALTA) 30 MG CAPSULE    Take 30 mg by mouth once daily.    ELIQUIS 5 MG TAB    Take 1 tablet (5 mg total) by mouth 2 (two) times a day.    FENOFIBRATE 160 MG TAB    Take 160 mg by mouth once daily.    HYDROCORTISONE (CORTEF) 10 MG TAB    Take 10 mg by mouth once daily. At 3 pm    HYDROCORTISONE (CORTEF) 5 MG TAB    Take 15 mg by mouth once daily. With breakfast.    INSULIN DETEMIR U-100 (LEVEMIR FLEXTOUCH U-100 INSULN) 100 UNIT/ML (3 ML) INPN PEN    Inject 100 Units into the skin 2 (two) times daily.    LEVOTHYROXINE (SYNTHROID) 100 MCG TABLET    Take 100 mcg by mouth once daily.    LIRAGLUTIDE 0.6 MG/0.1 ML, 18 MG/3 ML, SUBQ PNIJ (VICTOZA 2-DREW) 0.6 MG/0.1 ML (18 MG/3 ML) PNIJ PEN    Inject 1.8 mg into the skin once daily.    LOSARTAN (COZAAR) 100 MG TABLET    Take 1 tablet (100 mg total) by mouth once daily.    METOPROLOL TARTRATE (LOPRESSOR) 50 MG TABLET    Take 1 tablet (50 mg total) by mouth 2 (two) times daily.    ONDANSETRON (ZOFRAN) 4 MG TABLET    Take 4 mg by mouth every 8 (eight) hours as needed.    ONDANSETRON (ZOFRAN-ODT) 4 MG TBDL    Take 4 mg by mouth every 8 (eight) hours as needed.    VERAPAMIL (CALAN) 40 MG TAB    Take 40 mg by mouth 3 (three) times daily.   Changed and/or Refilled Medications    Modified Medication Previous Medication    ZOLPIDEM (AMBIEN) 10 MG TAB zolpidem (AMBIEN) 5 MG Tab       Take 1 tablet (10 mg total) by mouth every evening. for 7 days    Take 1 tablet (5 mg total) by mouth nightly as needed.      Medication List with Changes/Refills   Current Medications    ATORVASTATIN (LIPITOR) 80 MG TABLET    Take 80 mg by mouth once daily.       Start Date: 2/18/2022 End Date: --    BD GINA 2ND GEN PEN NEEDLE 32 GAUGE X 5/32" NDLE           Start Date: 6/9/2022  End Date: --    " BLOOD-GLUCOSE METER KIT    Use as instructed       Start Date: 8/8/2022  End Date: 8/8/2023    DOXEPIN (SINEQUAN) 25 MG CAPSULE    Take 1 capsule (25 mg total) by mouth every evening.       Start Date: 10/17/2022End Date: 10/17/2023    DULOXETINE (CYMBALTA) 30 MG CAPSULE    Take 30 mg by mouth once daily.       Start Date: 1/28/2022 End Date: --    ELIQUIS 5 MG TAB    Take 1 tablet (5 mg total) by mouth 2 (two) times a day.       Start Date: 8/30/2022 End Date: --    FENOFIBRATE 160 MG TAB    Take 160 mg by mouth once daily.       Start Date: 9/13/2021 End Date: --    HYDROCORTISONE (CORTEF) 10 MG TAB    Take 10 mg by mouth once daily. At 3 pm       Start Date: 7/27/2022 End Date: --    HYDROCORTISONE (CORTEF) 5 MG TAB    Take 15 mg by mouth once daily. With breakfast.       Start Date: 7/28/2022 End Date: --    INSULIN DETEMIR U-100 (LEVEMIR FLEXTOUCH U-100 INSULN) 100 UNIT/ML (3 ML) INPN PEN    Inject 100 Units into the skin 2 (two) times daily.       Start Date: 6/8/2022  End Date: 8/8/2023    LEVOTHYROXINE (SYNTHROID) 100 MCG TABLET    Take 100 mcg by mouth once daily.       Start Date: 11/22/2021End Date: --    LIRAGLUTIDE 0.6 MG/0.1 ML, 18 MG/3 ML, SUBQ PNIJ (VICTOZA 2-DREW) 0.6 MG/0.1 ML (18 MG/3 ML) PNIJ PEN    Inject 1.8 mg into the skin once daily.       Start Date: 11/30/2021End Date: --    LOSARTAN (COZAAR) 100 MG TABLET    Take 1 tablet (100 mg total) by mouth once daily.       Start Date: 8/30/2022 End Date: 8/30/2023    METOPROLOL TARTRATE (LOPRESSOR) 50 MG TABLET    Take 1 tablet (50 mg total) by mouth 2 (two) times daily.       Start Date: 8/30/2022 End Date: 8/30/2023    ONDANSETRON (ZOFRAN) 4 MG TABLET    Take 4 mg by mouth every 8 (eight) hours as needed.       Start Date: 8/5/2022  End Date: --    ONDANSETRON (ZOFRAN-ODT) 4 MG TBDL    Take 4 mg by mouth every 8 (eight) hours as needed.       Start Date: --        End Date: --    VERAPAMIL (CALAN) 40 MG TAB    Take 40 mg by mouth 3 (three) times  daily.       Start Date: --        End Date: --   Changed and/or Refilled Medications    Modified Medication Previous Medication    ZOLPIDEM (AMBIEN) 10 MG TAB zolpidem (AMBIEN) 5 MG Tab       Take 1 tablet (10 mg total) by mouth every evening. for 7 days    Take 1 tablet (5 mg total) by mouth nightly as needed.       Start Date: 11/9/2022 End Date: 11/16/2022    Start Date: 10/31/2022End Date: 11/9/2022

## 2022-11-14 DIAGNOSIS — F51.01 PRIMARY INSOMNIA: ICD-10-CM

## 2022-11-14 DIAGNOSIS — I48.91 ATRIAL FIBRILLATION, UNSPECIFIED TYPE: ICD-10-CM

## 2022-11-14 RX ORDER — VERAPAMIL HYDROCHLORIDE 40 MG/1
40 TABLET ORAL
COMMUNITY
Start: 2022-10-06 | End: 2023-01-23 | Stop reason: SDUPTHER

## 2022-11-14 RX ORDER — ONDANSETRON 4 MG/1
4 TABLET, FILM COATED ORAL
COMMUNITY
Start: 2022-08-05 | End: 2023-01-23 | Stop reason: SDUPTHER

## 2022-11-14 RX ORDER — APIXABAN 5 MG/1
5 TABLET, FILM COATED ORAL 2 TIMES DAILY
Qty: 180 TABLET | Refills: 3 | Status: SHIPPED | OUTPATIENT
Start: 2022-11-14 | End: 2023-06-22 | Stop reason: SDUPTHER

## 2022-11-14 RX ORDER — CALCIUM CITRATE/VITAMIN D3 200MG-6.25
1 TABLET ORAL 4 TIMES DAILY
COMMUNITY
Start: 2022-10-07 | End: 2023-03-02 | Stop reason: SDUPTHER

## 2022-11-14 RX ORDER — BLOOD-GLUCOSE METER
EACH MISCELLANEOUS
COMMUNITY
Start: 2022-08-17 | End: 2023-06-22

## 2022-11-14 RX ORDER — GLUCOSAM/CHON-MSM1/C/MANG/BOSW 500-416.6
TABLET ORAL
COMMUNITY
Start: 2022-10-07

## 2022-11-14 RX ORDER — LEVOTHYROXINE SODIUM 100 UG/1
100 TABLET ORAL EVERY MORNING
COMMUNITY
Start: 2021-11-22 | End: 2023-03-21 | Stop reason: SDUPTHER

## 2022-11-14 NOTE — PROGRESS NOTES
Please inform patient of results.    1.) A1c 8.4 - Needs to DC victoza and start Mounjaro (sample pack here)  2.) increasing creatinine - needs to increase daily water intake   3.) need to recheck CMP in 1 month (and A1c)    Other labwork within acceptable ranges.

## 2022-11-15 RX ORDER — ZOLPIDEM TARTRATE 10 MG/1
10 TABLET ORAL NIGHTLY
Qty: 30 TABLET | Refills: 0 | Status: SHIPPED | OUTPATIENT
Start: 2022-11-15 | End: 2022-12-19 | Stop reason: SDUPTHER

## 2022-11-15 RX ORDER — ZOLPIDEM TARTRATE 10 MG/1
10 TABLET ORAL NIGHTLY
Qty: 7 TABLET | Refills: 0 | Status: SHIPPED | OUTPATIENT
Start: 2022-11-15 | End: 2022-11-22

## 2022-11-15 NOTE — TELEPHONE ENCOUNTER
----- Message from Leticia Cindy sent at 11/15/2022 10:02 AM CST -----  Regarding: call back  .Type:  Needs Medical Advice    Who Called: pt   Symptoms (please be specific):    How long has patient had these symptoms:    Pharmacy name and phone #:  [facility] [address]   Would the patient rather a call back or a response via [facility]? Call back   Best Call Back Number: [phone]  Additional Information: pt states that the medication she was prescribed for sleeping works; however, the pharmacy only gave her a 1 week supply instead of a 1 month supply

## 2022-11-15 NOTE — TELEPHONE ENCOUNTER
----- Message from Leticia Cindy sent at 11/15/2022 10:02 AM CST -----  Regarding: call back  .Type:  Needs Medical Advice    Who Called: pt   Symptoms (please be specific):    How long has patient had these symptoms:    Pharmacy name and phone #:  [facility] [address]   Would the patient rather a call back or a response via [facility]? Call back   Best Call Back Number: [phone]  Additional Information: pt states that the medication she was prescribed for sleeping works; however, the pharmacy only gave her a 1 week supply instead of a 1 month supply

## 2022-11-15 NOTE — TELEPHONE ENCOUNTER
Please advise if pt. needs f/u appt.on new medication or if refill can just be sent to pharmacy for 30 day supply

## 2022-11-16 ENCOUNTER — TELEPHONE (OUTPATIENT)
Dept: FAMILY MEDICINE | Facility: CLINIC | Age: 57
End: 2022-11-16

## 2022-11-20 ENCOUNTER — PATIENT MESSAGE (OUTPATIENT)
Dept: ADMINISTRATIVE | Facility: OTHER | Age: 57
End: 2022-11-20
Payer: COMMERCIAL

## 2022-11-28 ENCOUNTER — DOCUMENTATION ONLY (OUTPATIENT)
Dept: ADMINISTRATIVE | Facility: HOSPITAL | Age: 57
End: 2022-11-28
Payer: COMMERCIAL

## 2022-11-29 NOTE — TELEPHONE ENCOUNTER
I spoke with Beth at [facility] and Dr. Weinberg had ordered a lower extremity u/s due to patient having calf pain. She stated the u/s wasn't done because patient is asymptomatic. She is asking if the u/s still needs to be done. Please advise. [place]

## 2022-12-02 ENCOUNTER — DOCUMENTATION ONLY (OUTPATIENT)
Dept: FAMILY MEDICINE | Facility: CLINIC | Age: 57
End: 2022-12-02
Payer: COMMERCIAL

## 2022-12-02 NOTE — PROGRESS NOTES
Diabetic Eye Results: Please inform patient of NORMAL Diabetic Eye Screen, indicating no signs of damage to the retina from diabetes. Repeat screening in 1 year.

## 2022-12-13 DIAGNOSIS — I48.91 ATRIAL FIBRILLATION, UNSPECIFIED TYPE: ICD-10-CM

## 2022-12-13 RX ORDER — APIXABAN 5 MG/1
5 TABLET, FILM COATED ORAL 2 TIMES DAILY
Qty: 180 TABLET | Refills: 3 | Status: CANCELLED | OUTPATIENT
Start: 2022-12-13

## 2022-12-15 ENCOUNTER — TELEPHONE (OUTPATIENT)
Dept: FAMILY MEDICINE | Facility: CLINIC | Age: 57
End: 2022-12-15
Payer: COMMERCIAL

## 2022-12-15 DIAGNOSIS — Z79.4 TYPE 2 DIABETES MELLITUS WITH HYPERGLYCEMIA, WITH LONG-TERM CURRENT USE OF INSULIN: Primary | Chronic | ICD-10-CM

## 2022-12-15 DIAGNOSIS — E11.65 TYPE 2 DIABETES MELLITUS WITH HYPERGLYCEMIA, WITH LONG-TERM CURRENT USE OF INSULIN: Primary | Chronic | ICD-10-CM

## 2022-12-15 RX ORDER — TIRZEPATIDE 5 MG/.5ML
5 INJECTION, SOLUTION SUBCUTANEOUS
Qty: 12 PEN | Refills: 0 | Status: SHIPPED | OUTPATIENT
Start: 2022-12-15 | End: 2023-01-05 | Stop reason: ALTCHOICE

## 2022-12-15 NOTE — TELEPHONE ENCOUNTER
----- Message from Junior Bhat sent at 12/15/2022  1:15 PM CST -----  .Type:  Needs Medical Advice    Who Called: pt  Pharmacy name and phone #:  [id number] - [address]  Would the patient rather a call back or a response via [facility]? Call back  Best Call Back Number: [phone]  Additional Information: pt has finished the Mounjaro sample packet that she was given and is asking if it could be prescribed and  sent to her pharmacy

## 2022-12-15 NOTE — TELEPHONE ENCOUNTER
I have signed for the following orders AND/OR meds.  Please call the patient and ask the patient to schedule the testing AND/OR inform about any medications that were sent.      No orders of the defined types were placed in this encounter.      Medications Ordered This Encounter   Medications    tirzepatide (MOUNJARO) 5 mg/0.5 mL PnIj     Sig: Inject 5 mg into the skin every 7 days.     Dispense:  12 pen     Refill:  0

## 2022-12-16 NOTE — TELEPHONE ENCOUNTER
Informed pt. of RX. Would like to know if there any labs that need to be done at this time.  Please advise.

## 2022-12-19 DIAGNOSIS — F51.01 PRIMARY INSOMNIA: ICD-10-CM

## 2022-12-21 ENCOUNTER — TELEPHONE (OUTPATIENT)
Dept: FAMILY MEDICINE | Facility: CLINIC | Age: 57
End: 2022-12-21
Payer: COMMERCIAL

## 2022-12-21 RX ORDER — ZOLPIDEM TARTRATE 10 MG/1
10 TABLET ORAL NIGHTLY
Qty: 30 TABLET | Refills: 0 | Status: SHIPPED | OUTPATIENT
Start: 2022-12-21 | End: 2023-01-19 | Stop reason: SDUPTHER

## 2022-12-21 NOTE — TELEPHONE ENCOUNTER
Spoke to pt. Informed that RX for Ambien was sent into pharmacy today. Advised to call pharmacy for . PA for Vernell is pending

## 2023-01-04 ENCOUNTER — TELEPHONE (OUTPATIENT)
Dept: FAMILY MEDICINE | Facility: CLINIC | Age: 58
End: 2023-01-04
Payer: COMMERCIAL

## 2023-01-04 DIAGNOSIS — Z79.4 TYPE 2 DIABETES MELLITUS WITH HYPERGLYCEMIA, WITH LONG-TERM CURRENT USE OF INSULIN: Primary | Chronic | ICD-10-CM

## 2023-01-04 DIAGNOSIS — E11.65 TYPE 2 DIABETES MELLITUS WITH HYPERGLYCEMIA, WITH LONG-TERM CURRENT USE OF INSULIN: Primary | Chronic | ICD-10-CM

## 2023-01-04 NOTE — TELEPHONE ENCOUNTER
----- Message from Dacia Salinas sent at 1/4/2023  1:13 PM CST -----  Regarding: advice  Pt called wanting to know if she could get another script for the moujouro being that the insurance doesn't pay for it.  [phone]

## 2023-01-04 NOTE — TELEPHONE ENCOUNTER
Due to denial of Mounjaro by insurance. Pt. Is requesting a script for Trulicity to be sent to her pharmacy. Insurance will cover RX.

## 2023-01-04 NOTE — TELEPHONE ENCOUNTER
----- Message from Dacia Salinas sent at 1/4/2023  1:08 PM CST -----  Regarding: refill  Type:  RX Refill Request    Who Called: pt  Refill or New Rx:refil  RX Name and Strength:DULoxetine DR capsule 30 mg    How is the patient currently taking it? (ex. 1XDay):1xday  Is this a 30 day or 90 day RX:30  Preferred Pharmacy [facility] phone number:[facility] [place]   Local or Mail Order:local  Ordering Provider:min peña  Would the patient rather a call back or a response via [facility]?   Best Call Back Number:[phone]  Additional Information: pt asked to get a 3 month supply

## 2023-01-05 RX ORDER — DULAGLUTIDE 3 MG/.5ML
3 INJECTION, SOLUTION SUBCUTANEOUS
Qty: 12 PEN | Refills: 3 | Status: SHIPPED | OUTPATIENT
Start: 2023-01-05 | End: 2023-01-23 | Stop reason: SDUPTHER

## 2023-01-05 RX ORDER — DULOXETIN HYDROCHLORIDE 30 MG/1
30 CAPSULE, DELAYED RELEASE ORAL DAILY
Qty: 90 CAPSULE | Refills: 0 | Status: SHIPPED | OUTPATIENT
Start: 2023-01-05 | End: 2023-04-12 | Stop reason: SDUPTHER

## 2023-01-06 NOTE — TELEPHONE ENCOUNTER
Phoned pt. No answer. Left VM to inform that RX for Trulicity pen was sent to pharmacy. Contact pharmacy for .

## 2023-01-06 NOTE — TELEPHONE ENCOUNTER
I have signed for the following orders AND/OR meds.  Please call the patient and ask the patient to schedule the testing AND/OR inform about any medications that were sent.      No orders of the defined types were placed in this encounter.      Medications Ordered This Encounter   Medications    DULoxetine (CYMBALTA) 30 MG capsule     Sig: Take 1 capsule (30 mg total) by mouth once daily.     Dispense:  90 capsule     Refill:  0

## 2023-01-06 NOTE — TELEPHONE ENCOUNTER
I have signed for the following orders AND/OR meds.  Please call the patient and ask the patient to schedule the testing AND/OR inform about any medications that were sent.      No orders of the defined types were placed in this encounter.      Medications Ordered This Encounter   Medications    dulaglutide (TRULICITY) 3 mg/0.5 mL pen injector     Sig: Inject 3 mg into the skin every 7 days.     Dispense:  12 pen     Refill:  3

## 2023-01-16 PROBLEM — Z09 HOSPITAL DISCHARGE FOLLOW-UP: Status: RESOLVED | Noted: 2022-08-08 | Resolved: 2023-01-16

## 2023-01-16 PROBLEM — J96.01 ACUTE RESPIRATORY FAILURE WITH HYPOXIA: Status: RESOLVED | Noted: 2022-10-12 | Resolved: 2023-01-16

## 2023-01-18 ENCOUNTER — PATIENT MESSAGE (OUTPATIENT)
Dept: ADMINISTRATIVE | Facility: HOSPITAL | Age: 58
End: 2023-01-18
Payer: COMMERCIAL

## 2023-01-18 DIAGNOSIS — E11.65 TYPE 2 DIABETES MELLITUS WITH HYPERGLYCEMIA, WITH LONG-TERM CURRENT USE OF INSULIN: Primary | Chronic | ICD-10-CM

## 2023-01-18 DIAGNOSIS — F51.01 PRIMARY INSOMNIA: ICD-10-CM

## 2023-01-18 DIAGNOSIS — Z79.4 TYPE 2 DIABETES MELLITUS WITH HYPERGLYCEMIA, WITH LONG-TERM CURRENT USE OF INSULIN: Primary | Chronic | ICD-10-CM

## 2023-01-18 NOTE — TELEPHONE ENCOUNTER
I have signed for the following orders AND/OR meds.  Please call the patient and ask the patient to schedule the testing AND/OR inform about any medications that were sent.      Orders Placed This Encounter   Procedures    Hemoglobin A1C     Standing Status:   Future     Standing Expiration Date:   4/18/2023    Comprehensive Metabolic Panel     Standing Status:   Future     Standing Expiration Date:   4/18/2023    MICROALBUMIN / CREATININE RATIO URINE     Standing Status:   Future     Standing Expiration Date:   4/18/2023     Order Specific Question:   Specimen Source     Answer:   Urine

## 2023-01-18 NOTE — TELEPHONE ENCOUNTER
----- Message from Layla Su sent at 1/18/2023  1:58 PM CST -----  Regarding: Lab orders  .Type:  Needs Medical Advice    Who Called: Pt  Symptoms (please be specific):    How long has patient had these symptoms:    Pharmacy name and phone #:    Would the patient rather a call back or a response via [facility]? Call back  Best Call Back Number: [phone]  Additional Information: Requesting lab orders, appt 1/23

## 2023-01-19 ENCOUNTER — TELEPHONE (OUTPATIENT)
Dept: FAMILY MEDICINE | Facility: CLINIC | Age: 58
End: 2023-01-19
Payer: COMMERCIAL

## 2023-01-19 RX ORDER — ZOLPIDEM TARTRATE 10 MG/1
10 TABLET ORAL NIGHTLY
Qty: 30 TABLET | Refills: 0 | Status: SHIPPED | OUTPATIENT
Start: 2023-01-19 | End: 2023-02-22 | Stop reason: SDUPTHER

## 2023-01-19 NOTE — TELEPHONE ENCOUNTER
----- Message from Leticia White sent at 1/19/2023  1:53 PM CST -----  Regarding: call back  .Type:  Needs Medical Advice    Who Called: pt   Symptoms (please be specific):    How long has patient had these symptoms:    Pharmacy name and phone #:    Would the patient rather a call back or a response via [facility]? Call back   Best Call Back Number: [phone]  Additional Information: pt is returning a call from the clinic

## 2023-01-20 DIAGNOSIS — E11.65 TYPE 2 DIABETES MELLITUS WITH HYPERGLYCEMIA, WITH LONG-TERM CURRENT USE OF INSULIN: Chronic | ICD-10-CM

## 2023-01-20 DIAGNOSIS — Z79.4 TYPE 2 DIABETES MELLITUS WITH HYPERGLYCEMIA, WITH LONG-TERM CURRENT USE OF INSULIN: Chronic | ICD-10-CM

## 2023-01-23 ENCOUNTER — OFFICE VISIT (OUTPATIENT)
Dept: FAMILY MEDICINE | Facility: CLINIC | Age: 58
End: 2023-01-23
Payer: COMMERCIAL

## 2023-01-23 VITALS
OXYGEN SATURATION: 97 % | HEIGHT: 60 IN | RESPIRATION RATE: 18 BRPM | HEART RATE: 73 BPM | BODY MASS INDEX: 34 KG/M2 | DIASTOLIC BLOOD PRESSURE: 82 MMHG | SYSTOLIC BLOOD PRESSURE: 130 MMHG | WEIGHT: 173.19 LBS | TEMPERATURE: 98 F

## 2023-01-23 DIAGNOSIS — E78.1 HYPERTRIGLYCERIDEMIA: Chronic | ICD-10-CM

## 2023-01-23 DIAGNOSIS — Z79.4 TYPE 2 DIABETES MELLITUS WITH STAGE 3A CHRONIC KIDNEY DISEASE, WITH LONG-TERM CURRENT USE OF INSULIN: Chronic | ICD-10-CM

## 2023-01-23 DIAGNOSIS — Z79.4 TYPE 2 DIABETES MELLITUS WITH DIABETIC POLYNEUROPATHY, WITH LONG-TERM CURRENT USE OF INSULIN: ICD-10-CM

## 2023-01-23 DIAGNOSIS — E87.5 HYPERKALEMIA: ICD-10-CM

## 2023-01-23 DIAGNOSIS — E11.65 TYPE 2 DIABETES MELLITUS WITH HYPERGLYCEMIA, WITH LONG-TERM CURRENT USE OF INSULIN: Chronic | ICD-10-CM

## 2023-01-23 DIAGNOSIS — E03.4 ACQUIRED ATROPHY OF THYROID: Chronic | ICD-10-CM

## 2023-01-23 DIAGNOSIS — E11.22 TYPE 2 DIABETES MELLITUS WITH STAGE 3A CHRONIC KIDNEY DISEASE, WITH LONG-TERM CURRENT USE OF INSULIN: Chronic | ICD-10-CM

## 2023-01-23 DIAGNOSIS — N18.31 TYPE 2 DIABETES MELLITUS WITH STAGE 3A CHRONIC KIDNEY DISEASE, WITH LONG-TERM CURRENT USE OF INSULIN: Chronic | ICD-10-CM

## 2023-01-23 DIAGNOSIS — E78.2 MIXED HYPERLIPIDEMIA DUE TO TYPE 2 DIABETES MELLITUS: Chronic | ICD-10-CM

## 2023-01-23 DIAGNOSIS — I15.2 HYPERTENSION ASSOCIATED WITH TYPE 2 DIABETES MELLITUS: Chronic | ICD-10-CM

## 2023-01-23 DIAGNOSIS — E11.69 MIXED HYPERLIPIDEMIA DUE TO TYPE 2 DIABETES MELLITUS: Chronic | ICD-10-CM

## 2023-01-23 DIAGNOSIS — E11.42 TYPE 2 DIABETES MELLITUS WITH DIABETIC POLYNEUROPATHY, WITH LONG-TERM CURRENT USE OF INSULIN: ICD-10-CM

## 2023-01-23 DIAGNOSIS — E11.59 HYPERTENSION ASSOCIATED WITH TYPE 2 DIABETES MELLITUS: Chronic | ICD-10-CM

## 2023-01-23 DIAGNOSIS — Z79.4 TYPE 2 DIABETES MELLITUS WITH HYPERGLYCEMIA, WITH LONG-TERM CURRENT USE OF INSULIN: Chronic | ICD-10-CM

## 2023-01-23 DIAGNOSIS — I10 PRIMARY HYPERTENSION: Primary | Chronic | ICD-10-CM

## 2023-01-23 DIAGNOSIS — E11.21 TYPE 2 DIABETES MELLITUS WITH DIABETIC NEPHROPATHY, WITH LONG-TERM CURRENT USE OF INSULIN: ICD-10-CM

## 2023-01-23 DIAGNOSIS — Z12.11 COLON CANCER SCREENING: ICD-10-CM

## 2023-01-23 DIAGNOSIS — Z79.4 TYPE 2 DIABETES MELLITUS WITH DIABETIC NEPHROPATHY, WITH LONG-TERM CURRENT USE OF INSULIN: ICD-10-CM

## 2023-01-23 DIAGNOSIS — E78.2 MIXED HYPERLIPIDEMIA: Chronic | ICD-10-CM

## 2023-01-23 DIAGNOSIS — E83.52 HYPERCALCEMIA: ICD-10-CM

## 2023-01-23 DIAGNOSIS — N18.31 CHRONIC KIDNEY DISEASE, STAGE 3A: Chronic | ICD-10-CM

## 2023-01-23 PROBLEM — E66.09 CLASS 1 OBESITY DUE TO EXCESS CALORIES WITH SERIOUS COMORBIDITY AND BODY MASS INDEX (BMI) OF 33.0 TO 33.9 IN ADULT: Chronic | Status: ACTIVE | Noted: 2022-08-08

## 2023-01-23 PROBLEM — E66.811 CLASS 1 OBESITY DUE TO EXCESS CALORIES WITH SERIOUS COMORBIDITY AND BODY MASS INDEX (BMI) OF 33.0 TO 33.9 IN ADULT: Chronic | Status: ACTIVE | Noted: 2022-08-08

## 2023-01-23 PROCEDURE — 99214 OFFICE O/P EST MOD 30 MIN: CPT | Mod: ,,, | Performed by: FAMILY MEDICINE

## 2023-01-23 PROCEDURE — 99214 PR OFFICE/OUTPT VISIT, EST, LEVL IV, 30-39 MIN: ICD-10-PCS | Mod: ,,, | Performed by: FAMILY MEDICINE

## 2023-01-23 RX ORDER — DULAGLUTIDE 3 MG/.5ML
3 INJECTION, SOLUTION SUBCUTANEOUS
Qty: 12 PEN | Refills: 3 | OUTPATIENT
Start: 2023-01-23 | End: 2024-01-23

## 2023-01-23 RX ORDER — DULAGLUTIDE 3 MG/.5ML
3 INJECTION, SOLUTION SUBCUTANEOUS
Qty: 12 PEN | Refills: 3 | Status: SHIPPED | OUTPATIENT
Start: 2023-01-23 | End: 2023-06-22 | Stop reason: SDUPTHER

## 2023-01-23 RX ORDER — PREGABALIN 25 MG/1
25 CAPSULE ORAL 2 TIMES DAILY
Qty: 60 CAPSULE | Refills: 6 | Status: SHIPPED | OUTPATIENT
Start: 2023-01-23 | End: 2023-06-23 | Stop reason: SDUPTHER

## 2023-01-23 NOTE — PROGRESS NOTES
Subjective:      Patient ID: Cleopatra Campos is a 57 y.o. female.    Chief Complaint: Lab Results (A1C)    DMII with multiple associated comorbidities and surveillances needed. Labs reviewed in person today.    No acute complaints, feels well.    Has been having trouble keeping a supply of trulicity, states pharmacy tells her it's been denied.      Problem List Items Addressed This Visit       Acquired atrophy of thyroid (Chronic)    Relevant Orders    TSH    Hemoglobin A1C    Primary hypertension - Primary (Chronic)    Relevant Orders    CBC Auto Differential    Comprehensive Metabolic Panel    Lipid Panel    Urinalysis    Hypertriglyceridemia (Chronic)    Relevant Orders    Comprehensive Metabolic Panel    Lipid Panel    Mixed hyperlipidemia (Chronic)    Relevant Orders    Comprehensive Metabolic Panel    Lipid Panel    Chronic kidney disease, stage 3a (Chronic)    Relevant Orders    Comprehensive Metabolic Panel    Lipid Panel    Urinalysis    Type 2 diabetes mellitus with stage 3a chronic kidney disease (Chronic)    Relevant Medications    dulaglutide (TRULICITY) 3 mg/0.5 mL pen injector    Other Relevant Orders    Comprehensive Metabolic Panel    Lipid Panel    Hemoglobin A1C    Urinalysis    Mixed hyperlipidemia due to type 2 diabetes mellitus (Chronic)    Relevant Medications    dulaglutide (TRULICITY) 3 mg/0.5 mL pen injector    Other Relevant Orders    Comprehensive Metabolic Panel    Lipid Panel    Hemoglobin A1C    Urinalysis    Hypertension associated with type 2 diabetes mellitus (Chronic)    Relevant Medications    dulaglutide (TRULICITY) 3 mg/0.5 mL pen injector    Other Relevant Orders    Comprehensive Metabolic Panel    Lipid Panel    Hemoglobin A1C    Urinalysis    BMI 33.0-33.9,adult (Chronic)    Type 2 diabetes mellitus with hyperglycemia, with long-term current use of insulin (Chronic)    Overview     Formatting of this note might be different from the original.  Last Assessment & Plan:    Formatting of this note might be different from the original.  Moderate dose ISS  Goal of blood sugars less than 180         Relevant Medications    dulaglutide (TRULICITY) 3 mg/0.5 mL pen injector    Other Relevant Orders    Comprehensive Metabolic Panel    Lipid Panel    Hemoglobin A1C    Urinalysis    Hypercalcemia    Relevant Orders    Comprehensive Metabolic Panel    Hyperkalemia    Relevant Orders    Comprehensive Metabolic Panel    Colon cancer screening    Relevant Orders    Cologuard Screening (Multitarget Stool DNA)     Other Visit Diagnoses       Type 2 diabetes mellitus with diabetic nephropathy, with long-term current use of insulin        Relevant Medications    dulaglutide (TRULICITY) 3 mg/0.5 mL pen injector    Other Relevant Orders    Comprehensive Metabolic Panel    Lipid Panel    Hemoglobin A1C    Urinalysis            The patient's Health Maintenance was reviewed and the following appears to be due:   Health Maintenance Due   Topic Date Due    Hepatitis C Screening  Never done    Pneumococcal Vaccines (Age 0-64) (1 - PCV) Never done    HIV Screening  Never done    TETANUS VACCINE  Never done    Mammogram  Never done    Shingles Vaccine (1 of 2) Never done    COVID-19 Vaccine (2 - Mixed Product series) 2021    Colorectal Cancer Screening  2023       Past Medical History:  Past Medical History:   Diagnosis Date    Clotting disorder     Coronary artery disease     Diabetes mellitus, type 2     DVT (deep venous thrombosis)     Heart attack     Hyperlipidemia     Hypertension     Hypothyroidism     Thyroid disease      Past Surgical History:   Procedure Laterality Date    APPENDECTOMY      CARDIAC CATHETERIZATION      CARPAL TUNNEL RELEASE       SECTION      CORONARY ARTERY BYPASS GRAFT      HYSTERECTOMY      ROTATOR CUFF REPAIR Right     TRIGGER FINGER RELEASE       Review of patient's allergies indicates:   Allergen Reactions    Enoxaparin Other (See Comments)     Heparin  "induced thrombocytopenia     Meperidine Swelling    Meperidine hcl Swelling    Heparin      Current Outpatient Medications on File Prior to Visit   Medication Sig Dispense Refill    atorvastatin (LIPITOR) 80 MG tablet Take 80 mg by mouth once daily.      BD GINA 2ND GEN PEN NEEDLE 32 gauge x " Ndle       blood-glucose meter kit Use as instructed 1 each 0    DULoxetine (CYMBALTA) 30 MG capsule Take 1 capsule (30 mg total) by mouth once daily. 90 capsule 0    ELIQUIS 5 mg Tab Take 1 tablet (5 mg total) by mouth 2 (two) times a day. 180 tablet 3    fenofibrate 160 MG Tab Take 160 mg by mouth once daily.      insulin detemir U-100 (LEVEMIR FLEXTOUCH U-100 INSULN) 100 unit/mL (3 mL) InPn pen Inject 100 Units into the skin 2 (two) times daily. 60 mL 0    levothyroxine (SYNTHROID) 100 MCG tablet Take 100 mcg by mouth every morning.      losartan (COZAAR) 100 MG tablet Take 1 tablet (100 mg total) by mouth once daily. 90 tablet 3    TRUE METRIX GLUCOSE METER Misc       TRUE METRIX GLUCOSE TEST STRIP Strp 1 strip 4 (four) times daily.      TRUEPLUS LANCETS 28 gauge Misc SMARTSI Topical Twice Daily      verapamiL (CALAN) 40 MG Tab Take 40 mg by mouth 3 (three) times daily.      zolpidem (AMBIEN) 10 mg Tab Take 1 tablet (10 mg total) by mouth every evening. 30 tablet 0    [DISCONTINUED] levothyroxine (SYNTHROID) 100 MCG tablet Take 100 mcg by mouth once daily.      [DISCONTINUED] ondansetron (ZOFRAN) 4 MG tablet Take 4 mg by mouth every 8 (eight) hours as needed.      metoprolol tartrate (LOPRESSOR) 50 MG tablet Take 1 tablet (50 mg total) by mouth 2 (two) times daily. (Patient not taking: Reported on 2023) 180 tablet 3    ondansetron (ZOFRAN-ODT) 4 MG TbDL Take 4 mg by mouth every 8 (eight) hours as needed.      [DISCONTINUED] doxepin (SINEQUAN) 25 MG capsule Take 1 capsule (25 mg total) by mouth every evening. 30 capsule 11    [DISCONTINUED] dulaglutide (TRULICITY) 3 mg/0.5 mL pen injector Inject 3 mg into the " skin every 7 days. (Patient not taking: Reported on 1/23/2023) 12 pen 3    [DISCONTINUED] hydrocortisone (CORTEF) 10 MG Tab Take 10 mg by mouth once daily. At 3 pm      [DISCONTINUED] hydrocortisone (CORTEF) 5 MG Tab Take 15 mg by mouth once daily. With breakfast.      [DISCONTINUED] ondansetron (ZOFRAN) 4 MG tablet Take 4 mg by mouth.      [DISCONTINUED] verapamiL (CALAN) 40 MG Tab Take 40 mg by mouth.       No current facility-administered medications on file prior to visit.     Social History     Socioeconomic History    Marital status:     Number of children: 3   Occupational History    Occupation: Retired    Tobacco Use    Smoking status: Former    Smokeless tobacco: Never    Tobacco comments:     Pt quit as of recent hospitalization for CABG/MI.    Substance and Sexual Activity    Alcohol use: Not Currently    Drug use: Never     Family History   Problem Relation Age of Onset    Diabetes Mother     Cancer Father        Review of Systems   Constitutional:  Negative for fatigue.   Cardiovascular:  Negative for chest pain.   Endocrine: Positive for polyphagia and polyuria. Negative for polydipsia.   Skin:  Negative for pallor.   Neurological:  Negative for dizziness, tremors, seizures, speech difficulty, weakness and headaches.   Psychiatric/Behavioral:  Negative for confusion. The patient is not nervous/anxious.    All other systems reviewed and are negative.    Objective:   /82 (BP Location: Right arm, Patient Position: Sitting, BP Method: Medium (Automatic))   Pulse 73   Temp 98.2 °F (36.8 °C) (Oral)   Resp 18   Ht 5' (1.524 m)   Wt 78.6 kg (173 lb 3.2 oz)   SpO2 97%   BMI 33.83 kg/m²     Physical Exam  Vitals and nursing note reviewed.   Constitutional:       General: She is awake.      Appearance: Normal appearance. She is well-developed and well-groomed. She is morbidly obese.   HENT:      Head: Normocephalic and atraumatic.      Right Ear: Tympanic membrane, ear canal and external ear  normal.      Left Ear: Tympanic membrane, ear canal and external ear normal.      Nose: Nose normal.      Mouth/Throat:      Mouth: Mucous membranes are moist.      Pharynx: Oropharynx is clear.   Eyes:      Extraocular Movements: Extraocular movements intact.      Conjunctiva/sclera: Conjunctivae normal.      Pupils: Pupils are equal, round, and reactive to light.   Cardiovascular:      Rate and Rhythm: Normal rate and regular rhythm.      Pulses: Normal pulses.      Heart sounds: Normal heart sounds.   Pulmonary:      Effort: Pulmonary effort is normal.      Breath sounds: Normal breath sounds.   Abdominal:      General: Abdomen is flat. Bowel sounds are normal.      Palpations: Abdomen is soft.   Musculoskeletal:         General: Normal range of motion.      Cervical back: Normal range of motion and neck supple.   Skin:     General: Skin is warm and dry.      Capillary Refill: Capillary refill takes less than 2 seconds.   Neurological:      General: No focal deficit present.      Mental Status: She is alert and oriented to person, place, and time. Mental status is at baseline.   Psychiatric:         Mood and Affect: Mood normal.         Behavior: Behavior normal. Behavior is cooperative.         Thought Content: Thought content normal.         Judgment: Judgment normal.       Procedures     Nutrition on 01/20/2023   Component Date Value Ref Range Status    Hemoglobin A1c 01/20/2023 7.0  <=7.0 % Final    Estimated Average Glucose 01/20/2023 154.2  mg/dL Final    Sodium Level 01/20/2023 138  136 - 145 mmol/L Final    Potassium Level 01/20/2023 5.8 (H)  3.5 - 5.1 mmol/L Final    Chloride 01/20/2023 109 (H)  98 - 107 mmol/L Final    Carbon Dioxide 01/20/2023 22  22 - 29 mmol/L Final    Glucose Level 01/20/2023 184 (H)  74 - 100 mg/dL Final    Blood Urea Nitrogen 01/20/2023 43.6 (H)  9.8 - 20.1 mg/dL Final    Creatinine 01/20/2023 0.94  0.55 - 1.02 mg/dL Final    Calcium Level Total 01/20/2023 10.4 (H)  8.4 - 10.2  mg/dL Final    Protein Total 01/20/2023 7.3  6.4 - 8.3 gm/dL Final    Albumin Level 01/20/2023 3.6  3.5 - 5.0 g/dL Final    Globulin 01/20/2023 3.7 (H)  2.4 - 3.5 gm/dL Final    Albumin/Globulin Ratio 01/20/2023 1.0 (L)  1.1 - 2.0 ratio Final    Bilirubin Total 01/20/2023 0.6  <=1.5 mg/dL Final    Alkaline Phosphatase 01/20/2023 209 (H)  40 - 150 unit/L Final    Alanine Aminotransferase 01/20/2023 57 (H)  0 - 55 unit/L Final    Aspartate Aminotransferase 01/20/2023 24  5 - 34 unit/L Final    eGFR 01/20/2023 >60  mls/min/1.73/m2 Final    Urine Microalbumin 01/20/2023 361.4 (H)  <=30.0 ug/ml Final    Urine Creatinine 01/20/2023 149.6 (H)  47.0 - 110.0 mg/dL Final    Microalbumin Creatinine Ratio 01/20/2023 241.6 (H)  0.0 - 30.0 mg/gm Cr Final   Documentation Only on 12/02/2022   Component Date Value Ref Range Status    Left Eye DM Retinopathy 10/12/2022 Negative   Final   Lab Visit on 11/09/2022   Component Date Value Ref Range Status    Hemoglobin A1c 11/09/2022 8.4 (H)  <=7.0 % Final    Estimated Average Glucose 11/09/2022 194.4  mg/dL Final    Sodium Level 11/09/2022 132 (L)  136 - 145 mmol/L Final    Potassium Level 11/09/2022 5.4 (H)  3.5 - 5.1 mmol/L Final    Chloride 11/09/2022 99  98 - 107 mmol/L Final    Carbon Dioxide 11/09/2022 25  22 - 29 mmol/L Final    Glucose Level 11/09/2022 330 (H)  74 - 100 mg/dL Final    Blood Urea Nitrogen 11/09/2022 22.1 (H)  9.8 - 20.1 mg/dL Final    Creatinine 11/09/2022 2.37 (H)  0.55 - 1.02 mg/dL Final    Calcium Level Total 11/09/2022 9.8  8.4 - 10.2 mg/dL Final    Protein Total 11/09/2022 7.1  6.4 - 8.3 gm/dL Final    Albumin Level 11/09/2022 2.9 (L)  3.5 - 5.0 gm/dL Final    Globulin 11/09/2022 4.2 (H)  2.4 - 3.5 gm/dL Final    Albumin/Globulin Ratio 11/09/2022 0.7 (L)  1.1 - 2.0 ratio Final    Bilirubin Total 11/09/2022 0.5  <=1.5 mg/dL Final    Alkaline Phosphatase 11/09/2022 101  40 - 150 unit/L Final    Alanine Aminotransferase 11/09/2022 10  0 - 55 unit/L Final     Aspartate Aminotransferase 11/09/2022 13  5 - 34 unit/L Final    eGFR 11/09/2022 23  mls/min/1.73/m2 Final    WBC 11/09/2022 9.8  4.5 - 11.5 x10(3)/mcL Final    RBC 11/09/2022 3.69 (L)  4.20 - 5.40 x10(6)/mcL Final    Hgb 11/09/2022 10.5 (L)  12.0 - 16.0 gm/dL Final    Hct 11/09/2022 32.7 (L)  37.0 - 47.0 % Final    MCV 11/09/2022 88.6  80.0 - 94.0 fL Final    MCH 11/09/2022 28.5  27.0 - 31.0 pg Final    MCHC 11/09/2022 32.1 (L)  33.0 - 36.0 mg/dL Final    RDW 11/09/2022 14.8  11.5 - 17.0 % Final    Platelet 11/09/2022 504 (H)  130 - 400 x10(3)/mcL Final    MPV 11/09/2022 8.7  7.4 - 10.4 fL Final    Neut % 11/09/2022 60.1  % Final    Lymph % 11/09/2022 27.1  % Final    Mono % 11/09/2022 10.6  % Final    Eos % 11/09/2022 1.7  % Final    Basophil % 11/09/2022 0.3  % Final    Lymph # 11/09/2022 2.65  0.6 - 4.6 x10(3)/mcL Final    Neut # 11/09/2022 5.9  2.1 - 9.2 x10(3)/mcL Final    Mono # 11/09/2022 1.04  0.1 - 1.3 x10(3)/mcL Final    Eos # 11/09/2022 0.17  0 - 0.9 x10(3)/mcL Final    Baso # 11/09/2022 0.03  0 - 0.2 x10(3)/mcL Final    IG# 11/09/2022 0.02  0 - 0.04 x10(3)/mcL Final    IG% 11/09/2022 0.2  % Final    NRBC% 11/09/2022 0.2  % Final   No results displayed because visit has over 200 results.      Historical on 09/17/2022   Component Date Value Ref Range Status    Blood Urea Nitrogen 08/10/2020 21 (H)  7 - 18 mg/dL Final    Creatinine 08/10/2020 0.70  0.6 - 1.3 mg/dL Final    Glucose Level 08/10/2020 252 (H)  74 - 106 mg/dL Final    HDL Cholesterol 08/10/2020 39  35 - 60 mg/dL Final    Cholesterol Total 08/10/2020 212 (H)  <200 mg/dL Final    Triglyceride 08/10/2020 499 (H)  30 - 150 mg/dL Final   Lab Requisition on 09/09/2022   Component Date Value Ref Range Status    Phosphorus Level 09/09/2022 6.0 (H)  2.3 - 4.7 mg/dL Final    Sodium Level 09/09/2022 135 (L)  136 - 145 mmol/L Final    Potassium Level 09/09/2022 5.5 (H)  3.5 - 5.1 mmol/L Final    Chloride 09/09/2022 100  98 - 107 mmol/L Final    Carbon  Dioxide 09/09/2022 27  22 - 29 mmol/L Final    Glucose Level 09/09/2022 197 (H)  74 - 100 mg/dL Final    Blood Urea Nitrogen 09/09/2022 41.0 (H)  9.8 - 20.1 mg/dL Final    Creatinine 09/09/2022 3.36 (H)  0.55 - 1.02 mg/dL Final    Calcium Level Total 09/09/2022 8.9  8.4 - 10.2 mg/dL Final    Protein Total 09/09/2022 6.5  6.4 - 8.3 gm/dL Final    Albumin Level 09/09/2022 2.8 (L)  3.5 - 5.0 gm/dL Final    Globulin 09/09/2022 3.7 (H)  2.4 - 3.5 gm/dL Final    Albumin/Globulin Ratio 09/09/2022 0.8 (L)  1.1 - 2.0 ratio Final    Bilirubin Total 09/09/2022 0.4  <=1.5 mg/dL Final    Alkaline Phosphatase 09/09/2022 298 (H)  40 - 150 unit/L Final    Alanine Aminotransferase 09/09/2022 15  0 - 55 unit/L Final    Aspartate Aminotransferase 09/09/2022 14  5 - 34 unit/L Final    eGFR 09/09/2022 15  mls/min/1.73/m2 Final    WBC 09/09/2022 9.9  4.5 - 11.5 x10(3)/mcL Final    RBC 09/09/2022 3.43 (L)  4.20 - 5.40 x10(6)/mcL Final    Hgb 09/09/2022 10.0 (L)  12.0 - 16.0 gm/dL Final    Hct 09/09/2022 31.7 (L)  37.0 - 47.0 % Final    MCV 09/09/2022 92.4  80.0 - 94.0 fL Final    MCH 09/09/2022 29.2  27.0 - 31.0 pg Final    MCHC 09/09/2022 31.5 (L)  33.0 - 36.0 mg/dL Final    RDW 09/09/2022 14.6  11.5 - 17.0 % Final    Platelet 09/09/2022 351  130 - 400 x10(3)/mcL Final    MPV 09/09/2022 9.6  7.4 - 10.4 fL Final    Neut % 09/09/2022 55.4  % Final    Lymph % 09/09/2022 34.1  % Final    Mono % 09/09/2022 8.8  % Final    Eos % 09/09/2022 1.1  % Final    Basophil % 09/09/2022 0.3  % Final    Lymph # 09/09/2022 3.36  0.6 - 4.6 x10(3)/mcL Final    Neut # 09/09/2022 5.5  2.1 - 9.2 x10(3)/mcL Final    Mono # 09/09/2022 0.87  0.1 - 1.3 x10(3)/mcL Final    Eos # 09/09/2022 0.11  0 - 0.9 x10(3)/mcL Final    Baso # 09/09/2022 0.03  0 - 0.2 x10(3)/mcL Final    IG# 09/09/2022 0.03  0 - 0.04 x10(3)/mcL Final    IG% 09/09/2022 0.3  % Final    NRBC% 09/09/2022 0.0  % Final   Patient Outreach on 08/02/2022   Component Date Value Ref Range Status     Jacklyn Result 01/09/2020 negative   Final-Edited       Assessment:     1. Primary hypertension    2. Mixed hyperlipidemia    3. Hypertriglyceridemia    4. Chronic kidney disease, stage 3a    5. Type 2 diabetes mellitus with hyperglycemia, with long-term current use of insulin    6. Hypertension associated with type 2 diabetes mellitus    7. Mixed hyperlipidemia due to type 2 diabetes mellitus    8. Type 2 diabetes mellitus with stage 3a chronic kidney disease, with long-term current use of insulin    9. Type 2 diabetes mellitus with diabetic nephropathy, with long-term current use of insulin    10. BMI 33.0-33.9,adult    11. Colon cancer screening    12. Hyperkalemia    13. Hypercalcemia    14. Acquired atrophy of thyroid      Plan:   I have discontinued Cleopatra MATHEW Campos's hydrocortisone, hydrocortisone, and doxepin. I am also having her maintain her LEVEMIR FLEXTOUCH U-100 INSULN, atorvastatin, fenofibrate, BD GINA 2ND GEN PEN NEEDLE, blood-glucose meter, losartan, metoprolol tartrate, verapamiL, ondansetron, ELIQUIS, TRUE METRIX GLUCOSE TEST STRIP, TRUEPLUS LANCETS, TRUE METRIX GLUCOSE METER, levothyroxine, DULoxetine, zolpidem, and TRULICITY.  Problem List Items Addressed This Visit       Acquired atrophy of thyroid (Chronic)    Relevant Orders    TSH    Hemoglobin A1C    Primary hypertension - Primary (Chronic)    Relevant Orders    CBC Auto Differential    Comprehensive Metabolic Panel    Lipid Panel    Urinalysis    Hypertriglyceridemia (Chronic)    Relevant Orders    Comprehensive Metabolic Panel    Lipid Panel    Mixed hyperlipidemia (Chronic)    Relevant Orders    Comprehensive Metabolic Panel    Lipid Panel    Chronic kidney disease, stage 3a (Chronic)    Relevant Orders    Comprehensive Metabolic Panel    Lipid Panel    Urinalysis    Type 2 diabetes mellitus with stage 3a chronic kidney disease (Chronic)    Relevant Medications    dulaglutide (TRULICITY) 3 mg/0.5 mL pen injector    Other Relevant Orders     Comprehensive Metabolic Panel    Lipid Panel    Hemoglobin A1C    Urinalysis    Mixed hyperlipidemia due to type 2 diabetes mellitus (Chronic)    Relevant Medications    dulaglutide (TRULICITY) 3 mg/0.5 mL pen injector    Other Relevant Orders    Comprehensive Metabolic Panel    Lipid Panel    Hemoglobin A1C    Urinalysis    Hypertension associated with type 2 diabetes mellitus (Chronic)    Relevant Medications    dulaglutide (TRULICITY) 3 mg/0.5 mL pen injector    Other Relevant Orders    Comprehensive Metabolic Panel    Lipid Panel    Hemoglobin A1C    Urinalysis    BMI 33.0-33.9,adult (Chronic)    Type 2 diabetes mellitus with hyperglycemia, with long-term current use of insulin (Chronic)    Relevant Medications    dulaglutide (TRULICITY) 3 mg/0.5 mL pen injector    Other Relevant Orders    Comprehensive Metabolic Panel    Lipid Panel    Hemoglobin A1C    Urinalysis    Hypercalcemia    Relevant Orders    Comprehensive Metabolic Panel    Hyperkalemia    Relevant Orders    Comprehensive Metabolic Panel    Colon cancer screening    Relevant Orders    Cologuard Screening (Multitarget Stool DNA)     Other Visit Diagnoses       Type 2 diabetes mellitus with diabetic nephropathy, with long-term current use of insulin        Relevant Medications    dulaglutide (TRULICITY) 3 mg/0.5 mL pen injector    Other Relevant Orders    Comprehensive Metabolic Panel    Lipid Panel    Hemoglobin A1C    Urinalysis          Follow up in about 3 months (around 4/23/2023).    Cleopatra was seen today for lab results.    Diagnoses and all orders for this visit:    Primary hypertension  -     CBC Auto Differential; Future  -     Comprehensive Metabolic Panel; Future  -     Lipid Panel; Future  -     Urinalysis; Future  -     Urinalysis   Continue current prescription medications. Refills as needed   Condition/Symptoms controlled/stable   Surveillance labs ordered as needed, or reviewed in visit.   RTC 6 months (as scheduled) or PRN    Mixed  hyperlipidemia  -     Comprehensive Metabolic Panel; Future  -     Lipid Panel; Future  - Surveillance labs ordered for prior to next visit in 3 months    Hypertriglyceridemia  -     Comprehensive Metabolic Panel; Future  -     Lipid Panel; Future  - See above  - Continue statins, etc    Chronic kidney disease, stage 3a  -     Comprehensive Metabolic Panel; Future  -     Lipid Panel; Future  -     Urinalysis; Future  -     Urinalysis  - Improved labs, but (see last 2 entries)    Type 2 diabetes mellitus with hyperglycemia, with long-term current use of insulin  -     dulaglutide (TRULICITY) 3 mg/0.5 mL pen injector; Inject 3 mg into the skin every 7 days.  -     Comprehensive Metabolic Panel; Future  -     Lipid Panel; Future  -     Hemoglobin A1C; Future  -     Urinalysis; Future  -     Urinalysis  - A1c down to 7.0!, continue current medications, seeking even tighter control, refill trulicity.  - RTC 3 months to assess improvement with steady supply of trulicity.    Hypertension associated with type 2 diabetes mellitus  -     Comprehensive Metabolic Panel; Future  -     Lipid Panel; Future  -     Hemoglobin A1C; Future  -     Urinalysis; Future  -     Urinalysis  - A1c down to 7.0!, continue current medications, seeking even tighter control, refill trulicity.  - RTC 3 months to assess improvement with steady supply of trulicity.    Mixed hyperlipidemia due to type 2 diabetes mellitus  -     Comprehensive Metabolic Panel; Future  -     Lipid Panel; Future  -     Hemoglobin A1C; Future  -     Urinalysis; Future  -     Urinalysis  - A1c down to 7.0!, continue current medications, seeking even tighter control, refill trulicity.  - RTC 3 months to assess improvement with steady supply of trulicity.    Type 2 diabetes mellitus with stage 3a chronic kidney disease, with long-term current use of insulin  -     Comprehensive Metabolic Panel; Future  -     Lipid Panel; Future  -     Hemoglobin A1C; Future  -     Urinalysis;  Future  -     Urinalysis  - A1c down to 7.0!, continue current medications, seeking even tighter control, refill trulicity.  - RTC 3 months to assess improvement with steady supply of trulicity.    Type 2 diabetes mellitus with diabetic nephropathy, with long-term current use of insulin  -     Comprehensive Metabolic Panel; Future  -     Lipid Panel; Future  -     Hemoglobin A1C; Future  -     Urinalysis; Future  -     Urinalysis  - A1c down to 7.0!, continue current medications, seeking even tighter control, refill trulicity.  - RTC 3 months to assess improvement with steady supply of trulicity.    BMI 33.0-33.9,adult   Improved, continue current care, expect contined improvement on this topic with steady supply of trulicity.    Colon cancer screening  -     Cologuard Screening (Multitarget Stool DNA); Future  -     Cologuard Screening (Multitarget Stool DNA)    Hyperkalemia  -     Comprehensive Metabolic Panel; Future  Hypercalcemia  -     Comprehensive Metabolic Panel; Future   For both above conditions, patient is asymptomatic and without exam findings    Forward results to nephrology for additional care options   Reassess in 3 months - if labs/levels persist, will begin my own investigations    Acquired atrophy of thyroid  -     TSH; Future  -     Hemoglobin A1C; Future      Medications Ordered This Encounter   Medications    dulaglutide (TRULICITY) 3 mg/0.5 mL pen injector     Sig: Inject 3 mg into the skin every 7 days.     Dispense:  12 pen     Refill:  3     [unfilled]  Orders Placed This Encounter   Procedures    Cologuard Screening (Multitarget Stool DNA)     Standing Status:   Future     Number of Occurrences:   1     Standing Expiration Date:   3/23/2024    CBC Auto Differential     Standing Status:   Future     Standing Expiration Date:   1/23/2024    Comprehensive Metabolic Panel     Standing Status:   Future     Standing Expiration Date:   1/23/2024    Lipid Panel     Standing Status:   Future      "Standing Expiration Date:   2024    TSH     Standing Status:   Future     Standing Expiration Date:   2024    Hemoglobin A1C     Standing Status:   Future     Standing Expiration Date:   2024    Urinalysis     Standing Status:   Future     Number of Occurrences:   1     Standing Expiration Date:   2024       Medication List with Changes/Refills   Current Medications    ATORVASTATIN (LIPITOR) 80 MG TABLET    Take 80 mg by mouth once daily.    BD GINA 2ND GEN PEN NEEDLE 32 GAUGE X 5/32" NDLE        BLOOD-GLUCOSE METER KIT    Use as instructed    DULOXETINE (CYMBALTA) 30 MG CAPSULE    Take 1 capsule (30 mg total) by mouth once daily.    ELIQUIS 5 MG TAB    Take 1 tablet (5 mg total) by mouth 2 (two) times a day.    FENOFIBRATE 160 MG TAB    Take 160 mg by mouth once daily.    INSULIN DETEMIR U-100 (LEVEMIR FLEXTOUCH U-100 INSULN) 100 UNIT/ML (3 ML) INPN PEN    Inject 100 Units into the skin 2 (two) times daily.    LEVOTHYROXINE (SYNTHROID) 100 MCG TABLET    Take 100 mcg by mouth every morning.    LOSARTAN (COZAAR) 100 MG TABLET    Take 1 tablet (100 mg total) by mouth once daily.    METOPROLOL TARTRATE (LOPRESSOR) 50 MG TABLET    Take 1 tablet (50 mg total) by mouth 2 (two) times daily.    ONDANSETRON (ZOFRAN-ODT) 4 MG TBDL    Take 4 mg by mouth every 8 (eight) hours as needed.    TRUE METRIX GLUCOSE METER MISC        TRUE METRIX GLUCOSE TEST STRIP STRP    1 strip 4 (four) times daily.    TRUEPLUS LANCETS 28 GAUGE MISC    SMARTSI Topical Twice Daily    VERAPAMIL (CALAN) 40 MG TAB    Take 40 mg by mouth 3 (three) times daily.    ZOLPIDEM (AMBIEN) 10 MG TAB    Take 1 tablet (10 mg total) by mouth every evening.   Changed and/or Refilled Medications    Modified Medication Previous Medication    DULAGLUTIDE (TRULICITY) 3 MG/0.5 ML PEN INJECTOR dulaglutide (TRULICITY) 3 mg/0.5 mL pen injector       Inject 3 mg into the skin every 7 days.    Inject 3 mg into the skin every 7 days.   Discontinued " "Medications    DOXEPIN (SINEQUAN) 25 MG CAPSULE    Take 1 capsule (25 mg total) by mouth every evening.    HYDROCORTISONE (CORTEF) 10 MG TAB    Take 10 mg by mouth once daily. At 3 pm    HYDROCORTISONE (CORTEF) 5 MG TAB    Take 15 mg by mouth once daily. With breakfast.    LEVOTHYROXINE (SYNTHROID) 100 MCG TABLET    Take 100 mcg by mouth once daily.    ONDANSETRON (ZOFRAN) 4 MG TABLET    Take 4 mg by mouth every 8 (eight) hours as needed.    ONDANSETRON (ZOFRAN) 4 MG TABLET    Take 4 mg by mouth.    VERAPAMIL (CALAN) 40 MG TAB    Take 40 mg by mouth.      Medication List with Changes/Refills   Current Medications    ATORVASTATIN (LIPITOR) 80 MG TABLET    Take 80 mg by mouth once daily.       Start Date: 2/18/2022 End Date: --    BD GINA 2ND GEN PEN NEEDLE 32 GAUGE X 5/32" NDLE           Start Date: 6/9/2022  End Date: --    BLOOD-GLUCOSE METER KIT    Use as instructed       Start Date: 8/8/2022  End Date: 8/8/2023    DULOXETINE (CYMBALTA) 30 MG CAPSULE    Take 1 capsule (30 mg total) by mouth once daily.       Start Date: 1/5/2023  End Date: 4/5/2023    ELIQUIS 5 MG TAB    Take 1 tablet (5 mg total) by mouth 2 (two) times a day.       Start Date: 11/14/2022End Date: --    FENOFIBRATE 160 MG TAB    Take 160 mg by mouth once daily.       Start Date: 9/13/2021 End Date: --    INSULIN DETEMIR U-100 (LEVEMIR FLEXTOUCH U-100 INSULN) 100 UNIT/ML (3 ML) INPN PEN    Inject 100 Units into the skin 2 (two) times daily.       Start Date: 6/8/2022  End Date: 8/8/2023    LEVOTHYROXINE (SYNTHROID) 100 MCG TABLET    Take 100 mcg by mouth every morning.       Start Date: 11/22/2021End Date: --    LOSARTAN (COZAAR) 100 MG TABLET    Take 1 tablet (100 mg total) by mouth once daily.       Start Date: 8/30/2022 End Date: 8/30/2023    METOPROLOL TARTRATE (LOPRESSOR) 50 MG TABLET    Take 1 tablet (50 mg total) by mouth 2 (two) times daily.       Start Date: 8/30/2022 End Date: 8/30/2023    ONDANSETRON (ZOFRAN-ODT) 4 MG TBLUDMILA    Take 4 mg " by mouth every 8 (eight) hours as needed.       Start Date: --        End Date: --    TRUE METRIX GLUCOSE METER MISC           Start Date: 2022 End Date: --    TRUE METRIX GLUCOSE TEST STRIP STRP    1 strip 4 (four) times daily.       Start Date: 10/7/2022 End Date: --    TRUEPLUS LANCETS 28 GAUGE MISC    SMARTSI Topical Twice Daily       Start Date: 10/7/2022 End Date: --    VERAPAMIL (CALAN) 40 MG TAB    Take 40 mg by mouth 3 (three) times daily.       Start Date: --        End Date: --    ZOLPIDEM (AMBIEN) 10 MG TAB    Take 1 tablet (10 mg total) by mouth every evening.       Start Date: 2023 End Date: 2023   Changed and/or Refilled Medications    Modified Medication Previous Medication    DULAGLUTIDE (TRULICITY) 3 MG/0.5 ML PEN INJECTOR dulaglutide (TRULICITY) 3 mg/0.5 mL pen injector       Inject 3 mg into the skin every 7 days.    Inject 3 mg into the skin every 7 days.       Start Date: 2023 End Date: 2024    Start Date: 2023  End Date: 2023   Discontinued Medications    DOXEPIN (SINEQUAN) 25 MG CAPSULE    Take 1 capsule (25 mg total) by mouth every evening.       Start Date: 10/17/2022End Date: 2023    HYDROCORTISONE (CORTEF) 10 MG TAB    Take 10 mg by mouth once daily. At 3 pm       Start Date: 2022 End Date: 2023    HYDROCORTISONE (CORTEF) 5 MG TAB    Take 15 mg by mouth once daily. With breakfast.       Start Date: 2022 End Date: 2023    LEVOTHYROXINE (SYNTHROID) 100 MCG TABLET    Take 100 mcg by mouth once daily.       Start Date: nd Date: 2023    ONDANSETRON (ZOFRAN) 4 MG TABLET    Take 4 mg by mouth every 8 (eight) hours as needed.       Start Date: 2022  End Date: 2023    ONDANSETRON (ZOFRAN) 4 MG TABLET    Take 4 mg by mouth.       Start Date: 2022  End Date: 2023    VERAPAMIL (CALAN) 40 MG TAB    Take 40 mg by mouth.       Start Date: 10/6/2022 End Date: 2023          Answers submitted by the patient  for this visit:  Diabetes Questionnaire (Submitted on 1/20/2023)  Chief Complaint: Diabetes problem  Diabetes type: type 2  MedicAlert ID: No  blurred vision: No  foot paresthesias: Yes  foot ulcerations: No  visual change: Yes  weight loss: Yes  Symptom course: improving  hunger: No  mood changes: No  sleepiness: No  sweats: No  blackouts: No  hospitalization: No  nocturnal hypoglycemia: No  required assistance: No  required glucagon: No  CVA: No  heart disease: Yes  nephropathy: Yes  peripheral neuropathy: Yes  PVD: No  retinopathy: No  autonomic neuropathy: Yes  CAD risks: dyslipidemia, family history, hypertension, obesity, diabetes mellitus  Current treatments: diet, insulin injections, intensive insulin program  Treatment compliance: all of the time  Dose schedule: pre-breakfast, pre-lunch, at bedtime  Given by: patient  Injection sites: abdominal wall, thighs  Home blood tests: 3-4 x per day  Home urines: <1 x per month  Monitoring compliance: excellent  Blood glucose trend: decreasing steadily  Weight trend: fluctuating minimally  Current diet: generally healthy  Meal planning: avoidance of concentrated sweets, calorie counting, carbohydrate counting  Exercise: three times a week  Dietitian visit: No  Eye exam current: Yes  Sees podiatrist: No

## 2023-01-30 ENCOUNTER — PATIENT MESSAGE (OUTPATIENT)
Dept: ADMINISTRATIVE | Facility: HOSPITAL | Age: 58
End: 2023-01-30
Payer: COMMERCIAL

## 2023-02-16 NOTE — PLAN OF CARE
Problem: Adult Inpatient Plan of Care  Goal: Plan of Care Review  Outcome: Ongoing, Progressing  Goal: Patient-Specific Goal (Individualized)  Outcome: Ongoing, Progressing  Goal: Absence of Hospital-Acquired Illness or Injury  Outcome: Ongoing, Progressing  Goal: Optimal Comfort and Wellbeing  Outcome: Ongoing, Progressing  Goal: Readiness for Transition of Care  Outcome: Ongoing, Progressing     Problem: Diabetes Comorbidity  Goal: Blood Glucose Level Within Targeted Range  Outcome: Ongoing, Progressing      Meloxicam was refilled 12/8/22 #30/3 refills. [facility] sent confirming patient's refill request for Prednisone.

## 2023-02-22 DIAGNOSIS — F51.01 PRIMARY INSOMNIA: ICD-10-CM

## 2023-02-22 RX ORDER — ZOLPIDEM TARTRATE 10 MG/1
10 TABLET ORAL NIGHTLY
Qty: 30 TABLET | Refills: 0 | Status: SHIPPED | OUTPATIENT
Start: 2023-02-22 | End: 2023-03-21 | Stop reason: SDUPTHER

## 2023-03-01 LAB — NONINV COLON CA DNA+OCC BLD SCRN STL QL: NEGATIVE

## 2023-03-02 RX ORDER — CALCIUM CITRATE/VITAMIN D3 200MG-6.25
1 TABLET ORAL 4 TIMES DAILY
Qty: 200 EACH | Refills: 0 | Status: SHIPPED | OUTPATIENT
Start: 2023-03-02 | End: 2023-07-11 | Stop reason: SDUPTHER

## 2023-03-02 NOTE — TELEPHONE ENCOUNTER
----- Message from Mela Simpson sent at 3/2/2023 10:00 AM CST -----  Regarding: Refill  Type:  RX Refill Request    Who Called: Cleopatra  Refill or New Rx:refill  RX Name and Strength:TRUE METRIX GLUCOSE TEST STRIP Strp  How is the patient currently taking it? (ex. 1XDay):  Is this a 30 day or 90 day RX:  Preferred Pharmacy with phone number:[facility] [address]  Local or Mail Order:Local  Ordering Provider:  Would the patient rather a call back or a response via [facility]?   Best Call Back Number:  Additional Information:

## 2023-03-21 DIAGNOSIS — F51.01 PRIMARY INSOMNIA: ICD-10-CM

## 2023-03-21 RX ORDER — ZOLPIDEM TARTRATE 10 MG/1
10 TABLET ORAL NIGHTLY
Qty: 15 TABLET | Refills: 0 | Status: SHIPPED | OUTPATIENT
Start: 2023-03-21 | End: 2023-06-22

## 2023-03-21 RX ORDER — LEVOTHYROXINE SODIUM 100 UG/1
100 TABLET ORAL EVERY MORNING
Qty: 15 TABLET | Refills: 0 | Status: SHIPPED | OUTPATIENT
Start: 2023-03-21 | End: 2023-04-12 | Stop reason: SDUPTHER

## 2023-04-03 ENCOUNTER — PATIENT MESSAGE (OUTPATIENT)
Dept: ADMINISTRATIVE | Facility: HOSPITAL | Age: 58
End: 2023-04-03
Payer: COMMERCIAL

## 2023-04-12 RX ORDER — DULOXETIN HYDROCHLORIDE 30 MG/1
30 CAPSULE, DELAYED RELEASE ORAL DAILY
Qty: 90 CAPSULE | Refills: 0 | Status: SHIPPED | OUTPATIENT
Start: 2023-04-12 | End: 2023-07-11 | Stop reason: SDUPTHER

## 2023-04-12 RX ORDER — LEVOTHYROXINE SODIUM 100 UG/1
100 TABLET ORAL EVERY MORNING
Qty: 15 TABLET | Refills: 0 | Status: SHIPPED | OUTPATIENT
Start: 2023-04-12 | End: 2023-06-01 | Stop reason: SDUPTHER

## 2023-05-30 ENCOUNTER — PATIENT MESSAGE (OUTPATIENT)
Dept: ADMINISTRATIVE | Facility: HOSPITAL | Age: 58
End: 2023-05-30
Payer: COMMERCIAL

## 2023-06-01 ENCOUNTER — TELEPHONE (OUTPATIENT)
Dept: FAMILY MEDICINE | Facility: CLINIC | Age: 58
End: 2023-06-01
Payer: COMMERCIAL

## 2023-06-01 RX ORDER — LEVOTHYROXINE SODIUM 100 UG/1
100 TABLET ORAL EVERY MORNING
Qty: 30 TABLET | Refills: 0 | Status: SHIPPED | OUTPATIENT
Start: 2023-06-01 | End: 2023-06-22 | Stop reason: SDUPTHER

## 2023-06-01 NOTE — TELEPHONE ENCOUNTER
----- Message from Sheridan Romano sent at 6/1/2023  8:26 AM CDT -----  .Type:  Needs Medical Advice    Who Called: Cleopatra Campos     Who Called: pt   Refill or New Rx:refill   RX Name and Strength:levothyroxine (SYNTHROID) 100 MCG tablet   How is the patient currently taking it?  1XDay   Is this a 30 day or 90 day RX:30   Preferred Pharmacy with phone number:[id number] [address]   Local or Mail Order:local   Ordering Provider:rita   Would the patient rather a call back or a response via [facility]? Call back   Best Call Back Number:[phone]   Additional Information: pt is requesting a refill, called last week no call back , I did explain she needed to see someone for a refill she asked if you would just call it in

## 2023-06-21 ENCOUNTER — TELEPHONE (OUTPATIENT)
Dept: FAMILY MEDICINE | Facility: CLINIC | Age: 58
End: 2023-06-21
Payer: COMMERCIAL

## 2023-06-21 DIAGNOSIS — E83.52 HYPERCALCEMIA: ICD-10-CM

## 2023-06-21 DIAGNOSIS — E11.22 TYPE 2 DIABETES MELLITUS WITH STAGE 3A CHRONIC KIDNEY DISEASE, WITH LONG-TERM CURRENT USE OF INSULIN: Chronic | ICD-10-CM

## 2023-06-21 DIAGNOSIS — Z79.4 TYPE 2 DIABETES MELLITUS WITH STAGE 3A CHRONIC KIDNEY DISEASE, WITH LONG-TERM CURRENT USE OF INSULIN: Chronic | ICD-10-CM

## 2023-06-21 DIAGNOSIS — E78.2 MIXED HYPERLIPIDEMIA: Chronic | ICD-10-CM

## 2023-06-21 DIAGNOSIS — Z11.59 ENCOUNTER FOR HEPATITIS C SCREENING TEST FOR LOW RISK PATIENT: ICD-10-CM

## 2023-06-21 DIAGNOSIS — Z11.4 ENCOUNTER FOR SCREENING FOR HIV: Primary | ICD-10-CM

## 2023-06-21 DIAGNOSIS — N18.31 TYPE 2 DIABETES MELLITUS WITH STAGE 3A CHRONIC KIDNEY DISEASE, WITH LONG-TERM CURRENT USE OF INSULIN: Chronic | ICD-10-CM

## 2023-06-21 NOTE — TELEPHONE ENCOUNTER
I have ordered the following labs. Please notify the patient.    Orders Placed This Encounter   Procedures    HIV 1/2 Ag/Ab (4th Gen)     Standing Status:   Future     Standing Expiration Date:   8/19/2024     Order Specific Question:   Release to patient     Answer:   Immediate    Hepatitis C Antibody     Standing Status:   Future     Standing Expiration Date:   8/19/2024     Order Specific Question:   Release to patient     Answer:   Immediate    Hemoglobin A1C     Standing Status:   Future     Standing Expiration Date:   9/21/2023    CBC Auto Differential     Standing Status:   Future     Standing Expiration Date:   9/21/2023    Comprehensive Metabolic Panel     Standing Status:   Future     Standing Expiration Date:   9/21/2023    Lipid Panel     Standing Status:   Future     Standing Expiration Date:   9/21/2023    Vitamin D     Standing Status:   Future     Standing Expiration Date:   9/21/2023    PTH, Intact     Standing Status:   Future     Standing Expiration Date:   8/19/2024    Calcium, Ionized     Standing Status:   Future     Standing Expiration Date:   8/19/2024

## 2023-06-21 NOTE — TELEPHONE ENCOUNTER
----- Message from Abigail Santos sent at 6/19/2023 12:18 PM CDT -----  Regarding: lab orders  Type:  Needs Medical Advice    Who Called: patient  Would the patient rather a call back or a response via [facility]?   Best Call Back Number: [phone] may leave a VM.  Additional Information: patient is calling for lab orders. Please call patient back.

## 2023-06-22 ENCOUNTER — OFFICE VISIT (OUTPATIENT)
Dept: FAMILY MEDICINE | Facility: CLINIC | Age: 58
End: 2023-06-22
Payer: COMMERCIAL

## 2023-06-22 VITALS
SYSTOLIC BLOOD PRESSURE: 115 MMHG | TEMPERATURE: 98 F | HEART RATE: 85 BPM | DIASTOLIC BLOOD PRESSURE: 75 MMHG | BODY MASS INDEX: 57.52 KG/M2 | RESPIRATION RATE: 18 BRPM | HEIGHT: 60 IN | OXYGEN SATURATION: 100 % | WEIGHT: 293 LBS

## 2023-06-22 DIAGNOSIS — F41.9 ANXIETY AND DEPRESSION: ICD-10-CM

## 2023-06-22 DIAGNOSIS — G47.00 INSOMNIA, UNSPECIFIED TYPE: ICD-10-CM

## 2023-06-22 DIAGNOSIS — I25.118 CORONARY ARTERY DISEASE OF NATIVE ARTERY OF NATIVE HEART WITH STABLE ANGINA PECTORIS: Chronic | ICD-10-CM

## 2023-06-22 DIAGNOSIS — Z79.4 TYPE 2 DIABETES MELLITUS WITH HYPERGLYCEMIA, WITH LONG-TERM CURRENT USE OF INSULIN: Primary | Chronic | ICD-10-CM

## 2023-06-22 DIAGNOSIS — E78.2 MIXED HYPERLIPIDEMIA: Chronic | ICD-10-CM

## 2023-06-22 DIAGNOSIS — Z95.1 S/P CABG (CORONARY ARTERY BYPASS GRAFT): Chronic | ICD-10-CM

## 2023-06-22 DIAGNOSIS — I48.91 ATRIAL FIBRILLATION, UNSPECIFIED TYPE: Chronic | ICD-10-CM

## 2023-06-22 DIAGNOSIS — Z23 NEED FOR SHINGLES VACCINE: ICD-10-CM

## 2023-06-22 DIAGNOSIS — I10 PRIMARY HYPERTENSION: Chronic | ICD-10-CM

## 2023-06-22 DIAGNOSIS — E03.4 ACQUIRED ATROPHY OF THYROID: Chronic | ICD-10-CM

## 2023-06-22 DIAGNOSIS — E11.42 DIABETIC POLYNEUROPATHY ASSOCIATED WITH TYPE 2 DIABETES MELLITUS: ICD-10-CM

## 2023-06-22 DIAGNOSIS — E11.65 TYPE 2 DIABETES MELLITUS WITH HYPERGLYCEMIA, WITH LONG-TERM CURRENT USE OF INSULIN: Primary | Chronic | ICD-10-CM

## 2023-06-22 DIAGNOSIS — F32.A ANXIETY AND DEPRESSION: ICD-10-CM

## 2023-06-22 DIAGNOSIS — Z79.899 HIGH RISK MEDICATION USE: ICD-10-CM

## 2023-06-22 DIAGNOSIS — N18.31 CHRONIC KIDNEY DISEASE, STAGE 3A: Chronic | ICD-10-CM

## 2023-06-22 PROBLEM — K57.92 DIVERTICULITIS: Status: RESOLVED | Noted: 2022-10-31 | Resolved: 2023-06-22

## 2023-06-22 PROBLEM — E66.811 CLASS 1 OBESITY DUE TO EXCESS CALORIES WITH SERIOUS COMORBIDITY AND BODY MASS INDEX (BMI) OF 33.0 TO 33.9 IN ADULT: Chronic | Status: RESOLVED | Noted: 2022-08-08 | Resolved: 2023-06-22

## 2023-06-22 PROBLEM — D63.1 ANEMIA IN CHRONIC KIDNEY DISEASE: Status: RESOLVED | Noted: 2022-08-19 | Resolved: 2023-06-22

## 2023-06-22 PROBLEM — E27.1 PRIMARY ADRENAL INSUFFICIENCY: Chronic | Status: RESOLVED | Noted: 2022-07-27 | Resolved: 2023-06-22

## 2023-06-22 PROBLEM — Z12.31 BREAST CANCER SCREENING BY MAMMOGRAM: Status: RESOLVED | Noted: 2022-08-08 | Resolved: 2023-06-22

## 2023-06-22 PROBLEM — E87.5 HYPERKALEMIA: Status: RESOLVED | Noted: 2022-08-19 | Resolved: 2023-06-22

## 2023-06-22 PROBLEM — I82.412 ACUTE DEEP VEIN THROMBOSIS (DVT) OF FEMORAL VEIN OF LEFT LOWER EXTREMITY: Status: RESOLVED | Noted: 2022-07-03 | Resolved: 2023-06-22

## 2023-06-22 PROBLEM — T46.4X5A ADVERSE EFFECT OF ANGIOTENSIN-CONVERTING ENZYME INHIBITOR: Status: RESOLVED | Noted: 2022-08-08 | Resolved: 2023-06-22

## 2023-06-22 PROBLEM — R40.0 DAYTIME SOMNOLENCE: Status: RESOLVED | Noted: 2022-08-08 | Resolved: 2023-06-22

## 2023-06-22 PROBLEM — F60.3 EMOTIONAL INSTABILITY: Status: RESOLVED | Noted: 2022-08-08 | Resolved: 2023-06-22

## 2023-06-22 PROBLEM — D75.829 HIT (HEPARIN-INDUCED THROMBOCYTOPENIA): Status: RESOLVED | Noted: 2022-07-27 | Resolved: 2023-06-22

## 2023-06-22 PROBLEM — E83.52 HYPERCALCEMIA: Status: RESOLVED | Noted: 2022-08-19 | Resolved: 2023-06-22

## 2023-06-22 PROBLEM — E66.09 CLASS 1 OBESITY DUE TO EXCESS CALORIES WITH SERIOUS COMORBIDITY AND BODY MASS INDEX (BMI) OF 33.0 TO 33.9 IN ADULT: Chronic | Status: RESOLVED | Noted: 2022-08-08 | Resolved: 2023-06-22

## 2023-06-22 PROBLEM — Z78.9 ACE INHIBITOR INTOLERANCE: Status: RESOLVED | Noted: 2022-08-08 | Resolved: 2023-06-22

## 2023-06-22 PROBLEM — E78.1 HYPERTRIGLYCERIDEMIA: Chronic | Status: RESOLVED | Noted: 2022-08-08 | Resolved: 2023-06-22

## 2023-06-22 PROBLEM — E11.59 HYPERTENSION ASSOCIATED WITH TYPE 2 DIABETES MELLITUS: Chronic | Status: RESOLVED | Noted: 2022-08-08 | Resolved: 2023-06-22

## 2023-06-22 PROBLEM — I15.2 HYPERTENSION ASSOCIATED WITH TYPE 2 DIABETES MELLITUS: Chronic | Status: RESOLVED | Noted: 2022-08-08 | Resolved: 2023-06-22

## 2023-06-22 PROBLEM — E11.69 MIXED HYPERLIPIDEMIA DUE TO TYPE 2 DIABETES MELLITUS: Chronic | Status: RESOLVED | Noted: 2022-08-08 | Resolved: 2023-06-22

## 2023-06-22 PROBLEM — Z90.710 HISTORY OF HYSTERECTOMY: Chronic | Status: RESOLVED | Noted: 2022-08-08 | Resolved: 2023-06-22

## 2023-06-22 PROBLEM — N18.9 ANEMIA IN CHRONIC KIDNEY DISEASE: Status: RESOLVED | Noted: 2022-08-19 | Resolved: 2023-06-22

## 2023-06-22 PROBLEM — I25.2 HISTORY OF MI (MYOCARDIAL INFARCTION): Status: RESOLVED | Noted: 2022-08-08 | Resolved: 2023-06-22

## 2023-06-22 PROBLEM — E11.22 TYPE 2 DIABETES MELLITUS WITH STAGE 3A CHRONIC KIDNEY DISEASE: Chronic | Status: RESOLVED | Noted: 2022-08-08 | Resolved: 2023-06-22

## 2023-06-22 PROBLEM — I82.A12 ACUTE DEEP VEIN THROMBOSIS (DVT) OF AXILLARY VEIN OF LEFT UPPER EXTREMITY: Status: RESOLVED | Noted: 2022-07-27 | Resolved: 2023-06-22

## 2023-06-22 PROBLEM — R00.1 BRADYCARDIA: Status: RESOLVED | Noted: 2022-10-12 | Resolved: 2023-06-22

## 2023-06-22 PROBLEM — G47.10 HYPERSOMNIA: Status: RESOLVED | Noted: 2022-08-08 | Resolved: 2023-06-22

## 2023-06-22 PROBLEM — Z12.11 COLON CANCER SCREENING: Status: RESOLVED | Noted: 2023-01-23 | Resolved: 2023-06-22

## 2023-06-22 LAB
AMPHET UR QL SCN: NEGATIVE
BARBITURATE SCN PRESENT UR: NEGATIVE
BENZODIAZ UR QL SCN: NEGATIVE
CANNABINOIDS UR QL SCN: NEGATIVE
COCAINE UR QL SCN: NEGATIVE
FENTANYL UR QL SCN: NEGATIVE
MDMA UR QL SCN: NEGATIVE
OPIATES UR QL SCN: NEGATIVE
PCP UR QL: NEGATIVE
PH UR: 6 [PH] (ref 3–11)
SPECIFIC GRAVITY, URINE AUTO (.000) (OHS): >=1.03 (ref 1–1.03)

## 2023-06-22 PROCEDURE — 90471 ZOSTER RECOMBINANT VACCINE: ICD-10-PCS | Mod: ,,, | Performed by: STUDENT IN AN ORGANIZED HEALTH CARE EDUCATION/TRAINING PROGRAM

## 2023-06-22 PROCEDURE — 90750 ZOSTER RECOMBINANT VACCINE: ICD-10-PCS | Mod: ,,, | Performed by: STUDENT IN AN ORGANIZED HEALTH CARE EDUCATION/TRAINING PROGRAM

## 2023-06-22 PROCEDURE — 99214 PR OFFICE/OUTPT VISIT, EST, LEVL IV, 30-39 MIN: ICD-10-PCS | Mod: 25,,, | Performed by: STUDENT IN AN ORGANIZED HEALTH CARE EDUCATION/TRAINING PROGRAM

## 2023-06-22 PROCEDURE — 80307 DRUG TEST PRSMV CHEM ANLYZR: CPT | Performed by: STUDENT IN AN ORGANIZED HEALTH CARE EDUCATION/TRAINING PROGRAM

## 2023-06-22 PROCEDURE — 90750 HZV VACC RECOMBINANT IM: CPT | Mod: ,,, | Performed by: STUDENT IN AN ORGANIZED HEALTH CARE EDUCATION/TRAINING PROGRAM

## 2023-06-22 PROCEDURE — 90471 IMMUNIZATION ADMIN: CPT | Mod: ,,, | Performed by: STUDENT IN AN ORGANIZED HEALTH CARE EDUCATION/TRAINING PROGRAM

## 2023-06-22 PROCEDURE — 99214 OFFICE O/P EST MOD 30 MIN: CPT | Mod: 25,,, | Performed by: STUDENT IN AN ORGANIZED HEALTH CARE EDUCATION/TRAINING PROGRAM

## 2023-06-22 RX ORDER — APIXABAN 5 MG/1
5 TABLET, FILM COATED ORAL 2 TIMES DAILY
Qty: 180 TABLET | Refills: 3 | Status: SHIPPED | OUTPATIENT
Start: 2023-06-22 | End: 2024-02-09 | Stop reason: SDUPTHER

## 2023-06-22 RX ORDER — VERAPAMIL HYDROCHLORIDE 40 MG/1
40 TABLET ORAL 3 TIMES DAILY
Qty: 270 TABLET | Refills: 3 | Status: SHIPPED | OUTPATIENT
Start: 2023-06-22

## 2023-06-22 RX ORDER — ATORVASTATIN CALCIUM 80 MG/1
80 TABLET, FILM COATED ORAL DAILY
Qty: 90 TABLET | Refills: 3 | Status: SHIPPED | OUTPATIENT
Start: 2023-06-22 | End: 2024-02-08 | Stop reason: SDUPTHER

## 2023-06-22 RX ORDER — DULAGLUTIDE 3 MG/.5ML
3 INJECTION, SOLUTION SUBCUTANEOUS
Qty: 12 PEN | Refills: 3 | Status: SHIPPED | OUTPATIENT
Start: 2023-06-22 | End: 2023-07-12 | Stop reason: SDUPTHER

## 2023-06-22 RX ORDER — HYDROXYZINE PAMOATE 25 MG/1
25 CAPSULE ORAL NIGHTLY PRN
Qty: 30 CAPSULE | Refills: 0 | Status: SHIPPED | OUTPATIENT
Start: 2023-06-22 | End: 2023-07-17 | Stop reason: SDUPTHER

## 2023-06-22 RX ORDER — LEVOTHYROXINE SODIUM 100 UG/1
100 TABLET ORAL EVERY MORNING
Qty: 30 TABLET | Refills: 0 | Status: SHIPPED | OUTPATIENT
Start: 2023-06-22 | End: 2023-07-25 | Stop reason: SDUPTHER

## 2023-06-22 RX ORDER — INSULIN DETEMIR 100 [IU]/ML
100 INJECTION, SOLUTION SUBCUTANEOUS 2 TIMES DAILY
Qty: 360 ML | Refills: 1 | Status: SHIPPED | OUTPATIENT
Start: 2023-06-22 | End: 2024-02-01 | Stop reason: SDUPTHER

## 2023-06-22 NOTE — ASSESSMENT & PLAN NOTE
- BP well-controlled.  -  Patient following with Dr. Conner Light with Cardiology.   - Continue Losartan 100mg daily, Metoprolol 50mg BID, and Verapamil 40mg TID.

## 2023-06-22 NOTE — ASSESSMENT & PLAN NOTE
- Labs for routine monitoring.  - Continue glucose monitoring.  - Continue Levemir 100U IBD and Trulicity 3mg weekly.  - Patient following with Optometry for her eye exams.  - Diabetic foot exam UTD.

## 2023-06-22 NOTE — ASSESSMENT & PLAN NOTE
- Patient stopped taking Ambien because it was hurting her stomach.   - She tried Trazodone in the past, but stopped due to side effects.  - Will trial Vistaril 25mg nightly PRN.

## 2023-06-22 NOTE — ASSESSMENT & PLAN NOTE
- High-Risk Medication Agreement signed by patient.  - UDS for routine monitoring.  - Will prescribe Lyrica 50mg BID if UDS returns consistent. Patient verbalized understanding.

## 2023-06-22 NOTE — ASSESSMENT & PLAN NOTE
- Stable.  -  Patient following with Dr. Conner Light with Cardiology.   - Continue Eliquis 5mg BID and Metoprolol Tartrate 50mg BID.

## 2023-06-22 NOTE — ASSESSMENT & PLAN NOTE
- S/p CABG  -  Patient following with Dr. Conner Light with Cardiology.   - Refer to A-fib, HTN, and HLD plans.

## 2023-06-22 NOTE — PROGRESS NOTES
Subjective:      Patient ID: Cleopatra Campos is a 58 y.o.  female. Patient accompanied by her , Mr. Aj Campos.    Chief Complaint: Establish Care    IDDMII w/Neuropathy: A1c 7.0 from 01/20/23. Patient checks her blood sugars 3-4 daily. Her average fasting blood sugar has been in the 150s. Patient giving herself 100U BID of Levemir. She gives herself Trulicity on Wednesdays. Patient following with Optometry for her eye exams. Neuropathy located to fingers, feet, and toes. She is taking Lyrica BID, but is inquiring about a dose increase if possible.    A-Fib/HTN/HLD/CAD/Hx of CABG: Patient following with Dr. Conner Light with Cardiology. Patient taking Lipitor, Eliquis, Fenofibrate, Losartan, Metoprolol, and Verapamil.    CKDIII: Patient following with Dr. Jordan Gastelum with Nephrology.    Hypothyroidism: Patient taking Synthroid.    Anxiety/Depression/Insomnia: Patient taking Cymbalta. Patient stopped taking Ambien because it was hurting her stomach. She tried Trazodone in the past, but stopped due to side effects.    Preventative Health: Patient amenable to shingles vaccine. Patient had a hysterectomy unrelated to CA. She denies any history of abnormal Pap smears.     Review of Systems   Constitutional:  Negative for activity change, fatigue and fever.   Eyes:  Negative for visual disturbance.   Respiratory:  Negative for apnea, cough and shortness of breath.    Cardiovascular:  Negative for chest pain, palpitations and leg swelling.   Gastrointestinal:  Negative for abdominal pain, blood in stool, diarrhea, nausea and vomiting.   Genitourinary:  Negative for dysuria and hematuria.   Musculoskeletal:  Negative for arthralgias, back pain and myalgias.   Skin:  Negative for rash and wound.   Neurological:  Positive for numbness (neuropathy). Negative for dizziness, syncope, weakness and headaches.   Psychiatric/Behavioral:  Positive for sleep disturbance. Negative for behavioral problems, dysphoric  mood and hallucinations. The patient is not nervous/anxious.      Objective:   /75 (BP Location: Right arm, Patient Position: Sitting, BP Method: Large (Automatic))   Pulse 85   Temp 98.3 °F (36.8 °C) (Temporal)   Resp 18   Ht 5' (1.524 m)   Wt (!) 199 kg (438 lb 11.5 oz)   SpO2 100%   BMI 85.68 kg/m²     Physical Exam  Vitals and nursing note reviewed.   Constitutional:       General: She is not in acute distress.     Appearance: Normal appearance. She is obese. She is not ill-appearing or toxic-appearing.   HENT:      Head: Normocephalic and atraumatic.      Mouth/Throat:      Mouth: Mucous membranes are moist.      Pharynx: Oropharynx is clear.   Eyes:      Conjunctiva/sclera: Conjunctivae normal.   Cardiovascular:      Rate and Rhythm: Normal rate and regular rhythm.      Heart sounds: Normal heart sounds. No murmur heard.  Pulmonary:      Effort: Pulmonary effort is normal. No respiratory distress.      Breath sounds: Normal breath sounds.   Abdominal:      General: There is no distension.      Palpations: Abdomen is soft.      Tenderness: There is no abdominal tenderness.   Musculoskeletal:         General: No deformity.      Cervical back: Neck supple. No tenderness.      Right lower leg: No edema.      Left lower leg: No edema.   Lymphadenopathy:      Cervical: No cervical adenopathy.   Skin:     General: Skin is warm and dry.      Findings: No lesion or rash.   Neurological:      General: No focal deficit present.      Mental Status: She is alert.   Psychiatric:         Mood and Affect: Mood normal.         Behavior: Behavior normal.         Thought Content: Thought content normal.         Judgment: Judgment normal.     Assessment/Plan:   1. Type 2 diabetes mellitus with hyperglycemia, with long-term current use of insulin  Assessment & Plan:  - Labs for routine monitoring.  - Continue glucose monitoring.  - Continue Levemir 100U IBD and Trulicity 3mg weekly.  - Patient following with Optometry  for her eye exams.  - Diabetic foot exam UTD.    Orders:  -     dulaglutide (TRULICITY) 3 mg/0.5 mL pen injector; Inject 3 mg into the skin every 7 days.  Dispense: 12 pen; Refill: 3  -     insulin detemir U-100, Levemir, (LEVEMIR FLEXTOUCH U100 INSULIN) 100 unit/mL (3 mL) InPn pen; Inject 100 Units into the skin 2 (two) times daily.  Dispense: 360 mL; Refill: 1    2. Diabetic polyneuropathy associated with type 2 diabetes mellitus  Assessment & Plan:  - High-Risk Medication Agreement signed by patient.  - UDS for routine monitoring.  - Will prescribe Lyrica 50mg BID if UDS returns consistent. Patient verbalized understanding.      3. High risk medication use  -     Drug Screen, Urine    4. Atrial fibrillation, unspecified type  Assessment & Plan:  - Stable.  -  Patient following with Dr. Conner Light with Cardiology.   - Continue Eliquis 5mg BID and Metoprolol Tartrate 50mg BID.    Orders:  -     ELIQUIS 5 mg Tab; Take 1 tablet (5 mg total) by mouth 2 (two) times a day.  Dispense: 180 tablet; Refill: 3  -     verapamiL (CALAN) 40 MG Tab; Take 1 tablet (40 mg total) by mouth 3 (three) times daily.  Dispense: 270 tablet; Refill: 3    5. Primary hypertension  Assessment & Plan:  - BP well-controlled.  -  Patient following with Dr. Conner Light with Cardiology.   - Continue Losartan 100mg daily, Metoprolol 50mg BID, and Verapamil 40mg TID.     Orders:  -     verapamiL (CALAN) 40 MG Tab; Take 1 tablet (40 mg total) by mouth 3 (three) times daily.  Dispense: 270 tablet; Refill: 3    6. Mixed hyperlipidemia  Assessment & Plan:  - Continue Lipitor 80mg daily and Fenofibrate 160mg daily.    Orders:  -     atorvastatin (LIPITOR) 80 MG tablet; Take 1 tablet (80 mg total) by mouth once daily.  Dispense: 90 tablet; Refill: 3    7. Coronary artery disease of native artery of native heart with stable angina pectoris  Assessment & Plan:  - S/p CABG  -  Patient following with Dr. Conner Light with Cardiology.   - Refer to A-fib,  HTN, and HLD plans.      8. S/P CABG (coronary artery bypass graft)  Assessment & Plan:  - Refer to CAD plan.      9. Chronic kidney disease, stage 3a  Assessment & Plan:  - Patient following with Dr. Jordan Gastelum with Nephrology.      10. Acquired atrophy of thyroid  Assessment & Plan:  - Continue Synthroid 100mcg daily.    Orders:  -     levothyroxine (SYNTHROID) 100 MCG tablet; Take 1 tablet (100 mcg total) by mouth every morning.  Dispense: 30 tablet; Refill: 0    11. Anxiety and depression  Assessment & Plan:  - Mood stable.  - Continue Cymbalta 30mg daily.      12. Insomnia, unspecified type  Assessment & Plan:  - Patient stopped taking Ambien because it was hurting her stomach.   - She tried Trazodone in the past, but stopped due to side effects.  - Will trial Vistaril 25mg nightly PRN.    Orders:  -     hydrOXYzine pamoate (VISTARIL) 25 MG Cap; Take 1 capsule (25 mg total) by mouth nightly as needed (insomnia).  Dispense: 30 capsule; Refill: 0    13. Need for shingles vaccine  -     Zoster Recombinant Vaccine       Follow up in about 3 months (around 9/22/2023) for Wellness.

## 2023-06-23 DIAGNOSIS — E11.42 TYPE 2 DIABETES MELLITUS WITH DIABETIC POLYNEUROPATHY, WITH LONG-TERM CURRENT USE OF INSULIN: Primary | ICD-10-CM

## 2023-06-23 DIAGNOSIS — Z79.4 TYPE 2 DIABETES MELLITUS WITH DIABETIC POLYNEUROPATHY, WITH LONG-TERM CURRENT USE OF INSULIN: Primary | ICD-10-CM

## 2023-06-23 RX ORDER — PREGABALIN 50 MG/1
50 CAPSULE ORAL 2 TIMES DAILY
Qty: 60 CAPSULE | Refills: 0 | Status: SHIPPED | OUTPATIENT
Start: 2023-06-23 | End: 2023-09-02 | Stop reason: SDUPTHER

## 2023-06-26 ENCOUNTER — TELEPHONE (OUTPATIENT)
Dept: FAMILY MEDICINE | Facility: CLINIC | Age: 58
End: 2023-06-26
Payer: COMMERCIAL

## 2023-06-26 NOTE — TELEPHONE ENCOUNTER
----- Message from April Stewart sent at 6/23/2023  2:14 PM CDT -----  Regarding: return call  .Type:  Patient Returning Call    Who Called: patient  Who Left Message for Patient: [facility]  Does the patient know what this is regarding?:  Would the patient rather a call back or a response via [facility]? Call back  Best Call Back Number: [phone]  Additional Information: patient was returning a missed call from [facility] this morning. Please advise.        [facility] Medicine  Progress Note    Patient Name: Felicia Lopez  MRN: [id number]  Patient Class: IP- Inpatient   Admission Date: 8/9/2022  Length of Stay: 1 days  Attending Physician: Dwight Molina MD  Primary Care Provider: Mickey Darden MD        Subjective:     Principal Problem:Acute on chronic combined systolic and diastolic congestive heart failure        HPI:  Mr. Felicia Lopez is a 76 y.o. male, with PMH of CAD (s/p stenting), CHF, prior MI/CVA, HTN, CKD-3, anemnia, HLD, who presented to [facility] ED on 8/9/22 due to chest pressure that awoke him from sleep tonight. He states the pain radiated to his left arm, and was associated with shortness of breath. He notes the pain started 30 mins PTA to the ED. He states the pain improved after 1 spray of Nitroglycerine. He states symptoms are similar to his prior MI, which occurred 12 years ago. He denied nausea, dizziness. He was evaluated in the ED with labs showing thrombocytopenia with PLT of 119K. INR was elevated at 4.2, he does not have coumadin listed as a home med. A metabolic panel showed Cr 1.8, BUN 26,  without hyperkalemia. BNP was elevated at 1117. A troponin was 0.150. CXR showed b/l infiltrates and left sided effusion concerning for pulmonary edema. An EKG showed ST depression and TWI in V4. After his pain returned in the ED, a repeat EKG showed both of these findings had normalized. He was placed on OBS.       Overview/Hospital Course:  Felicia Lopez was admitted for acute CHF exacerbation. BNP 1117 and CXR with pulmonary edema. Troponin mildly elevated but flat. Initially placed on 2L NC for increased work of breathing, weaning as tolerated. Cardiology consulted, diurese with lasix 40mg IVP BID, repeat TTE with cCHFrEF 45%      Interval History: No acute events overnight, patient seen this AM sitting at bedside, work of breathing improved since yesterday's exam. Still decreased breath sounds in lower left lung  fields, BLE edema  Continue diuresis    Review of Systems   Reason unable to perform ROS: Poor patient participation during exam.   Constitutional:  Negative for activity change, chills and fever.   Respiratory:  Positive for shortness of breath. Negative for cough.    Cardiovascular:  Positive for chest pain (with radiation to left arm; resolved). Negative for palpitations.   Gastrointestinal:  Negative for abdominal pain, nausea and vomiting.   Genitourinary:  Positive for frequency (2/2 lasix). Negative for decreased urine volume, difficulty urinating and dysuria.   Musculoskeletal:  Negative for back pain.   Skin:  Negative for color change.   Neurological:  Negative for dizziness, syncope, weakness, light-headedness and headaches.   Psychiatric/Behavioral:  Negative for agitation and confusion.    Objective:     Vital Signs (Most Recent):  Temp: 98.8 °F (37.1 °C) (08/10/22 0809)  Pulse: 99 (08/10/22 0809)  Resp: 17 (08/10/22 0809)  BP: 139/75 (08/10/22 0809)  SpO2: 97 % (08/10/22 0809) Vital Signs (24h Range):  Temp:  [97.2 °F (36.2 °C)-98.8 °F (37.1 °C)] 98.8 °F (37.1 °C)  Pulse:  [] 99  Resp:  [17-21] 17  SpO2:  [96 %-97 %] 97 %  BP: (109-139)/(62-87) 139/75     Weight: 89.8 kg (197 lb 15.6 oz)  Body mass index is 26.85 kg/m².    Intake/Output Summary (Last 24 hours) at 8/10/2022 0943  Last data filed at 8/10/2022 0906  Gross per 24 hour   Intake 240 ml   Output 650 ml   Net -410 ml      Physical Exam  Vitals and nursing note reviewed.   Constitutional:       General: He is not in acute distress.     Appearance: He is well-developed and normal weight. He is not ill-appearing.   HENT:      Head: Normocephalic and atraumatic.   Eyes:      General:         Right eye: No discharge.         Left eye: No discharge.   Neck:      Vascular: No JVD.      Trachea: No tracheal deviation.   Cardiovascular:      Rate and Rhythm: Normal rate and regular rhythm.   Pulmonary:      Effort: Pulmonary effort is normal. No  respiratory distress.      Comments: Decreased breath sounds on left  Abdominal:      General: Abdomen is flat. There is no distension.      Palpations: Abdomen is soft.   Musculoskeletal:      Cervical back: Normal range of motion and neck supple.      Right lower leg: Edema present.      Left lower leg: Edema present.   Skin:     General: Skin is warm and dry.   Neurological:      General: No focal deficit present.      Mental Status: He is alert.      Motor: No abnormal muscle tone.   Psychiatric:         Mood and Affect: Mood normal.         Behavior: Behavior normal.       Significant Labs: All pertinent labs within the past 24 hours have been reviewed.    Significant Imaging: I have reviewed all pertinent imaging results/findings within the past 24 hours.      Assessment/Plan:      * Acute on chronic combined systolic and diastolic congestive heart failure  - notes b/l LE edema  - no lasix on home meds list   - today BNP is elevated at 1117  - CXR with pulm edema  - cardiology consulted given his cardiac history and chest pain with typical features  - No ACS  - diurese with lasix 40mg IVP BID  - monitor I&Os and daily weights     TTE:  Results for orders placed during the hospital encounter of 08/09/22    Echo    Interpretation Summary  · Moderate left atrial enlargement.  · The left ventricle is normal in size with mildly decreased systolic function.  · The estimated ejection fraction is 45%.  · Grade III left ventricular diastolic dysfunction.  · There is left ventricular global hypokinesis.  · Normal right ventricular size with normal right ventricular systolic function.  · Mild mitral regurgitation.  · Mild tricuspid regurgitation.      Chest pain  - Mr. Felicia Lopez presents with acute onset of left sided chest pain while sleeping   - chest pain was relieved by Nitro administration both with EMS, and in the ED   - a 1/2012 stress echo showed evidence of inducible ischemia   - Troponin was elevated at  0.133 and flat  - cardiology consulted: no ACS  - no anticoagulation ordered 2/2 thrombocytopenia, and elevated INR   - lipid panel and A1C: lipids well controlled, a1c 5.7    Acute renal failure superimposed on stage 3 chronic kidney disease  - baseline Cr appears to be about 1.3   - today Cr is 1.8   - suspect 2/2 volume overload  - holding lisinopril    - urine lytes ordered   - avoid nephrotoxins   - renally dose meds     Supratherapeutic INR  - INR in ED was elevated at 4.2   - monitor INR daily   - no warfarin use   - given he also has thrombocytopenia, suspect there may be an element of liver dysfunction  - no active bleeding at present     CAD (coronary artery disease)  - s/p stenting per history  - continue clopidogrel 75 mg QD, simvastatin 40 mg QD (or formulary equivalent)   - no ASA on home meds list, suspect 2/2 h/o thrombocytopenia     Iron deficiency anemia  - H&H appear stable   - monitor       Hyperlipidemia  - continue statin   - lipid panel pending       Anemia        Thrombocytopenia  - baseline PLT appears to be 130-140K  - today PLT count is 119K   - no active bleeding   - monitor       Essential hypertension  - normotensive at present   - home meds: HCTZ 25 mg QD, lisinopril 20 mg QD, metoprolol tartrate 25 mg BID   - monitor         VTE Risk Mitigation (From admission, onward)         Ordered     Reason for No Pharmacological VTE Prophylaxis  Once        Question:  Reasons:  Answer:  Thrombocytopenia    08/09/22 0645     IP VTE HIGH RISK PATIENT  Once         08/09/22 0645     Place sequential compression device  Until discontinued         08/09/22 0645                Discharge Planning   SHANICE:      Code Status: Full Code   Is the patient medically ready for discharge?:     Reason for patient still in hospital (select all that apply): Patient trending condition and Treatment                     Gill Vang PA-C  Department of Hospital Medicine   [facility] [place] [facility] ([place])

## 2023-07-11 DIAGNOSIS — E11.65 TYPE 2 DIABETES MELLITUS WITH HYPERGLYCEMIA, WITH LONG-TERM CURRENT USE OF INSULIN: Primary | Chronic | ICD-10-CM

## 2023-07-11 DIAGNOSIS — F41.9 ANXIETY AND DEPRESSION: ICD-10-CM

## 2023-07-11 DIAGNOSIS — F32.A ANXIETY AND DEPRESSION: ICD-10-CM

## 2023-07-11 DIAGNOSIS — Z79.4 TYPE 2 DIABETES MELLITUS WITH HYPERGLYCEMIA, WITH LONG-TERM CURRENT USE OF INSULIN: Primary | Chronic | ICD-10-CM

## 2023-07-12 DIAGNOSIS — Z79.4 TYPE 2 DIABETES MELLITUS WITH HYPERGLYCEMIA, WITH LONG-TERM CURRENT USE OF INSULIN: Primary | Chronic | ICD-10-CM

## 2023-07-12 DIAGNOSIS — E11.65 TYPE 2 DIABETES MELLITUS WITH HYPERGLYCEMIA, WITH LONG-TERM CURRENT USE OF INSULIN: Primary | Chronic | ICD-10-CM

## 2023-07-12 RX ORDER — DULOXETIN HYDROCHLORIDE 30 MG/1
30 CAPSULE, DELAYED RELEASE ORAL DAILY
Qty: 90 CAPSULE | Refills: 3 | Status: SHIPPED | OUTPATIENT
Start: 2023-07-12 | End: 2024-01-02

## 2023-07-12 RX ORDER — CALCIUM CITRATE/VITAMIN D3 200MG-6.25
1 TABLET ORAL 4 TIMES DAILY
Qty: 200 EACH | Refills: 2 | Status: SHIPPED | OUTPATIENT
Start: 2023-07-12 | End: 2023-12-04 | Stop reason: SDUPTHER

## 2023-07-13 RX ORDER — DULAGLUTIDE 3 MG/.5ML
3 INJECTION, SOLUTION SUBCUTANEOUS
Qty: 12 PEN | Refills: 3 | Status: SHIPPED | OUTPATIENT
Start: 2023-07-13 | End: 2023-07-14 | Stop reason: SDUPTHER

## 2023-07-14 DIAGNOSIS — Z79.4 TYPE 2 DIABETES MELLITUS WITH HYPERGLYCEMIA, WITH LONG-TERM CURRENT USE OF INSULIN: Chronic | ICD-10-CM

## 2023-07-14 DIAGNOSIS — E11.65 TYPE 2 DIABETES MELLITUS WITH HYPERGLYCEMIA, WITH LONG-TERM CURRENT USE OF INSULIN: Chronic | ICD-10-CM

## 2023-07-17 DIAGNOSIS — G47.00 INSOMNIA, UNSPECIFIED TYPE: ICD-10-CM

## 2023-07-17 RX ORDER — DULAGLUTIDE 3 MG/.5ML
3 INJECTION, SOLUTION SUBCUTANEOUS
Qty: 12 PEN | Refills: 3 | Status: SHIPPED | OUTPATIENT
Start: 2023-07-17 | End: 2023-11-09 | Stop reason: SDUPTHER

## 2023-07-17 RX ORDER — HYDROXYZINE PAMOATE 25 MG/1
25 CAPSULE ORAL NIGHTLY PRN
Qty: 30 CAPSULE | Refills: 0 | Status: SHIPPED | OUTPATIENT
Start: 2023-07-17 | End: 2023-08-13 | Stop reason: SDUPTHER

## 2023-07-25 DIAGNOSIS — E03.4 ACQUIRED ATROPHY OF THYROID: Primary | Chronic | ICD-10-CM

## 2023-07-25 RX ORDER — LEVOTHYROXINE SODIUM 100 UG/1
100 TABLET ORAL EVERY MORNING
Qty: 90 TABLET | Refills: 0 | Status: SHIPPED | OUTPATIENT
Start: 2023-07-25 | End: 2023-10-13 | Stop reason: SDUPTHER

## 2023-08-13 DIAGNOSIS — G47.00 INSOMNIA, UNSPECIFIED TYPE: Primary | ICD-10-CM

## 2023-08-14 RX ORDER — HYDROXYZINE PAMOATE 25 MG/1
25 CAPSULE ORAL NIGHTLY PRN
Qty: 30 CAPSULE | Refills: 0 | Status: SHIPPED | OUTPATIENT
Start: 2023-08-14 | End: 2023-08-17 | Stop reason: SDUPTHER

## 2023-08-16 DIAGNOSIS — G47.00 INSOMNIA, UNSPECIFIED TYPE: ICD-10-CM

## 2023-08-17 DIAGNOSIS — G47.00 INSOMNIA, UNSPECIFIED TYPE: ICD-10-CM

## 2023-08-17 RX ORDER — HYDROXYZINE PAMOATE 25 MG/1
25 CAPSULE ORAL NIGHTLY PRN
Qty: 30 CAPSULE | Refills: 0 | OUTPATIENT
Start: 2023-08-17

## 2023-08-17 RX ORDER — HYDROXYZINE PAMOATE 25 MG/1
25 CAPSULE ORAL NIGHTLY PRN
Qty: 30 CAPSULE | Refills: 0 | Status: SHIPPED | OUTPATIENT
Start: 2023-08-17 | End: 2023-09-11 | Stop reason: SDUPTHER

## 2023-08-17 NOTE — TELEPHONE ENCOUNTER
----- Message from April Stewart sent at 8/17/2023  2:07 PM CDT -----  Regarding: med advice  .Type:  Needs Medical Advice    Who Called: patient  Symptoms (please be specific):    How long has patient had these symptoms:    Pharmacy name and phone #:    Would the patient rather a call back or a response via [facility]? Call back  Best Call Back Number:  [phone]  Additional Information: patient stated that pharmacy does not have the medication refill of Vistaril. Please advise.

## 2023-09-02 DIAGNOSIS — E11.42 TYPE 2 DIABETES MELLITUS WITH DIABETIC POLYNEUROPATHY, WITH LONG-TERM CURRENT USE OF INSULIN: Primary | ICD-10-CM

## 2023-09-02 DIAGNOSIS — Z79.4 TYPE 2 DIABETES MELLITUS WITH DIABETIC POLYNEUROPATHY, WITH LONG-TERM CURRENT USE OF INSULIN: Primary | ICD-10-CM

## 2023-09-06 RX ORDER — PREGABALIN 50 MG/1
50 CAPSULE ORAL 2 TIMES DAILY
Qty: 60 CAPSULE | Refills: 0 | Status: SHIPPED | OUTPATIENT
Start: 2023-09-06 | End: 2023-09-27 | Stop reason: SDUPTHER

## 2023-09-11 DIAGNOSIS — G47.00 INSOMNIA, UNSPECIFIED TYPE: ICD-10-CM

## 2023-09-11 RX ORDER — HYDROXYZINE PAMOATE 25 MG/1
25 CAPSULE ORAL NIGHTLY PRN
Qty: 90 CAPSULE | Refills: 3 | Status: SHIPPED | OUTPATIENT
Start: 2023-09-11 | End: 2023-11-09 | Stop reason: SDUPTHER

## 2023-09-11 RX ORDER — HYDROXYZINE PAMOATE 25 MG/1
25 CAPSULE ORAL NIGHTLY PRN
Qty: 30 CAPSULE | Refills: 0 | Status: CANCELLED | OUTPATIENT
Start: 2023-09-11

## 2023-09-11 NOTE — TELEPHONE ENCOUNTER
Is patient doing well on Vistaril 25mg nightly? If she is doing well, can refill for 90-day-supply versus 30-day-supply.

## 2023-09-18 DIAGNOSIS — I10 HYPERTENSION, UNSPECIFIED TYPE: ICD-10-CM

## 2023-09-18 NOTE — TELEPHONE ENCOUNTER
----- Message from Dacia Salinas sent at 9/18/2023 12:44 PM CDT -----  Regarding: refill  Type:  RX Refill Request    Who Called: pt  Refill or New Rx:refill  RX Name and Strength:metoprolol tartrate (LOPRESSOR) 50 MG tablet  How is the patient currently taking it? (ex. 1XDay):2xday  Is this a 30 day or 90 day RX:30    RX Name and Strength:gabapenin .25mg  How is the patient currently taking it? (ex. 1XDay):2xday  Is this a 30 day or 90 day RX:30    RX Name and Strength ondansetron (ZOFRAN) 4 MG tablet  How is the patient currently taking it? (ex. 1XDay):as needed  Is this a 30 day or 90 day RX:30      Preferred Pharmacy with phone number:[facility] in [place] [address]  Local or Mail Order:local  Ordering Provider:yola salinas  Would the patient rather a call back or a response via [facility]?   Best Call Back Number:[phone]  Additional Information: pt called about needing a refil on medication

## 2023-09-19 ENCOUNTER — PATIENT MESSAGE (OUTPATIENT)
Dept: ADMINISTRATIVE | Facility: HOSPITAL | Age: 58
End: 2023-09-19
Payer: COMMERCIAL

## 2023-09-19 RX ORDER — METOPROLOL TARTRATE 50 MG/1
50 TABLET ORAL 2 TIMES DAILY
Qty: 180 TABLET | Refills: 3 | Status: SHIPPED | OUTPATIENT
Start: 2023-09-19 | End: 2024-02-08 | Stop reason: SDUPTHER

## 2023-09-19 RX ORDER — ONDANSETRON 4 MG/1
4 TABLET, ORALLY DISINTEGRATING ORAL EVERY 8 HOURS PRN
Qty: 30 TABLET | Refills: 1 | Status: SHIPPED | OUTPATIENT
Start: 2023-09-19 | End: 2023-12-04 | Stop reason: SDUPTHER

## 2023-09-19 NOTE — TELEPHONE ENCOUNTER
Spoke to patients  he states the pharmacy isn't receiving the medication request due to the system being down. States his wifes blood pressure earlier today was 92/160 and she has been very weak and been sleeping a lot. I advised him that we can do a verbal over the phone since the system is down at the pharmacy but he would need to bring patient to the ER due to her blood pressure. He states she has been out of medication for 3 days and its very important that she doesn't miss any days of medicine. I advised again that she would need to be brought in to a ER for precaution.   Spoke to pharmacist at [place] [id number] on [facility] and gave verbal for mediation.

## 2023-09-19 NOTE — TELEPHONE ENCOUNTER
Medications prescribed.    Is patient's /92???    If patient's BP is <90/50, then that is hypotension. If patient has any red flag symptoms, then she will need to present to the ER for further evaluation.

## 2023-09-20 DIAGNOSIS — E11.42 TYPE 2 DIABETES MELLITUS WITH DIABETIC POLYNEUROPATHY, WITH LONG-TERM CURRENT USE OF INSULIN: ICD-10-CM

## 2023-09-20 DIAGNOSIS — Z79.4 TYPE 2 DIABETES MELLITUS WITH DIABETIC POLYNEUROPATHY, WITH LONG-TERM CURRENT USE OF INSULIN: ICD-10-CM

## 2023-09-21 RX ORDER — PREGABALIN 50 MG/1
50 CAPSULE ORAL 2 TIMES DAILY
Qty: 60 CAPSULE | Refills: 0 | OUTPATIENT
Start: 2023-09-21 | End: 2024-03-21

## 2023-09-26 DIAGNOSIS — I10 HYPERTENSION, UNSPECIFIED TYPE: ICD-10-CM

## 2023-09-26 RX ORDER — LOSARTAN POTASSIUM 100 MG/1
100 TABLET ORAL DAILY
Qty: 90 TABLET | Refills: 1 | Status: SHIPPED | OUTPATIENT
Start: 2023-09-26 | End: 2024-03-19

## 2023-09-27 DIAGNOSIS — Z79.4 TYPE 2 DIABETES MELLITUS WITH DIABETIC POLYNEUROPATHY, WITH LONG-TERM CURRENT USE OF INSULIN: Primary | ICD-10-CM

## 2023-09-27 DIAGNOSIS — E11.42 TYPE 2 DIABETES MELLITUS WITH DIABETIC POLYNEUROPATHY, WITH LONG-TERM CURRENT USE OF INSULIN: Primary | ICD-10-CM

## 2023-09-28 RX ORDER — PREGABALIN 50 MG/1
50 CAPSULE ORAL 2 TIMES DAILY
Qty: 60 CAPSULE | Refills: 0 | Status: SHIPPED | OUTPATIENT
Start: 2023-09-28 | End: 2023-11-09 | Stop reason: SDUPTHER

## 2023-10-04 ENCOUNTER — PATIENT OUTREACH (OUTPATIENT)
Dept: ADMINISTRATIVE | Facility: HOSPITAL | Age: 58
End: 2023-10-04
Payer: COMMERCIAL

## 2023-10-04 NOTE — PROGRESS NOTES
Population Health. Out Reach. Reviewing patient's chart for quality metrics. I contacted patient to see if she has had a recent mmg. No answer. Left Message.   SOB SOB SOB SOB

## 2023-10-13 DIAGNOSIS — E03.4 ACQUIRED ATROPHY OF THYROID: Chronic | ICD-10-CM

## 2023-10-13 RX ORDER — LEVOTHYROXINE SODIUM 100 UG/1
100 TABLET ORAL EVERY MORNING
Qty: 90 TABLET | Refills: 0 | Status: SHIPPED | OUTPATIENT
Start: 2023-10-13 | End: 2024-02-01 | Stop reason: SDUPTHER

## 2023-11-01 ENCOUNTER — LAB VISIT (OUTPATIENT)
Dept: LAB | Facility: HOSPITAL | Age: 58
End: 2023-11-01
Attending: STUDENT IN AN ORGANIZED HEALTH CARE EDUCATION/TRAINING PROGRAM
Payer: COMMERCIAL

## 2023-11-01 DIAGNOSIS — E83.52 HYPERCALCEMIA: ICD-10-CM

## 2023-11-01 DIAGNOSIS — Z11.4 ENCOUNTER FOR SCREENING FOR HIV: ICD-10-CM

## 2023-11-01 DIAGNOSIS — Z11.59 ENCOUNTER FOR HEPATITIS C SCREENING TEST FOR LOW RISK PATIENT: ICD-10-CM

## 2023-11-01 LAB
HCV AB SERPL QL IA: NONREACTIVE
HIV 1+2 AB+HIV1 P24 AG SERPL QL IA: NONREACTIVE
PTH-INTACT SERPL-MCNC: 55.7 PG/ML (ref 8.7–77)

## 2023-11-01 PROCEDURE — 83970 ASSAY OF PARATHORMONE: CPT

## 2023-11-01 PROCEDURE — 87389 HIV-1 AG W/HIV-1&-2 AB AG IA: CPT

## 2023-11-01 PROCEDURE — 86803 HEPATITIS C AB TEST: CPT

## 2023-11-01 PROCEDURE — 82330 ASSAY OF CALCIUM: CPT

## 2023-11-01 PROCEDURE — 36415 COLL VENOUS BLD VENIPUNCTURE: CPT

## 2023-11-02 LAB — MAYO GENERIC ORDERABLE RESULT: NORMAL

## 2023-11-09 DIAGNOSIS — E11.65 TYPE 2 DIABETES MELLITUS WITH HYPERGLYCEMIA, WITH LONG-TERM CURRENT USE OF INSULIN: Chronic | ICD-10-CM

## 2023-11-09 DIAGNOSIS — E11.42 TYPE 2 DIABETES MELLITUS WITH DIABETIC POLYNEUROPATHY, WITH LONG-TERM CURRENT USE OF INSULIN: ICD-10-CM

## 2023-11-09 DIAGNOSIS — Z79.4 TYPE 2 DIABETES MELLITUS WITH HYPERGLYCEMIA, WITH LONG-TERM CURRENT USE OF INSULIN: Chronic | ICD-10-CM

## 2023-11-09 DIAGNOSIS — Z79.4 TYPE 2 DIABETES MELLITUS WITH DIABETIC POLYNEUROPATHY, WITH LONG-TERM CURRENT USE OF INSULIN: ICD-10-CM

## 2023-11-09 DIAGNOSIS — G47.00 INSOMNIA, UNSPECIFIED TYPE: ICD-10-CM

## 2023-11-09 RX ORDER — HYDROXYZINE PAMOATE 25 MG/1
25 CAPSULE ORAL NIGHTLY PRN
Qty: 90 CAPSULE | Refills: 3 | Status: SHIPPED | OUTPATIENT
Start: 2023-11-09

## 2023-11-09 RX ORDER — DULAGLUTIDE 3 MG/.5ML
3 INJECTION, SOLUTION SUBCUTANEOUS
Qty: 12 PEN | Refills: 3 | Status: SHIPPED | OUTPATIENT
Start: 2023-11-09 | End: 2024-02-27 | Stop reason: ALTCHOICE

## 2023-11-09 RX ORDER — PREGABALIN 50 MG/1
50 CAPSULE ORAL 2 TIMES DAILY
Qty: 60 CAPSULE | Refills: 0 | Status: SHIPPED | OUTPATIENT
Start: 2023-11-09 | End: 2023-12-04 | Stop reason: SDUPTHER

## 2023-11-09 NOTE — TELEPHONE ENCOUNTER
Did patient want Trulicty and Lyrica sent to [facility] as well? If so, can we re-propose those orders?

## 2023-12-04 DIAGNOSIS — Z79.4 TYPE 2 DIABETES MELLITUS WITH HYPERGLYCEMIA, WITH LONG-TERM CURRENT USE OF INSULIN: Chronic | ICD-10-CM

## 2023-12-04 DIAGNOSIS — E11.42 TYPE 2 DIABETES MELLITUS WITH DIABETIC POLYNEUROPATHY, WITH LONG-TERM CURRENT USE OF INSULIN: ICD-10-CM

## 2023-12-04 DIAGNOSIS — Z79.4 TYPE 2 DIABETES MELLITUS WITH DIABETIC POLYNEUROPATHY, WITH LONG-TERM CURRENT USE OF INSULIN: ICD-10-CM

## 2023-12-04 DIAGNOSIS — E11.65 TYPE 2 DIABETES MELLITUS WITH HYPERGLYCEMIA, WITH LONG-TERM CURRENT USE OF INSULIN: Chronic | ICD-10-CM

## 2023-12-04 RX ORDER — CALCIUM CITRATE/VITAMIN D3 200MG-6.25
1 TABLET ORAL 4 TIMES DAILY
Qty: 200 EACH | Refills: 2 | Status: SHIPPED | OUTPATIENT
Start: 2023-12-04 | End: 2023-12-11 | Stop reason: SDUPTHER

## 2023-12-07 ENCOUNTER — TELEPHONE (OUTPATIENT)
Dept: FAMILY MEDICINE | Facility: CLINIC | Age: 58
End: 2023-12-07
Payer: COMMERCIAL

## 2023-12-07 RX ORDER — ONDANSETRON 4 MG/1
4 TABLET, ORALLY DISINTEGRATING ORAL EVERY 8 HOURS PRN
Qty: 30 TABLET | Refills: 0 | Status: SHIPPED | OUTPATIENT
Start: 2023-12-07

## 2023-12-07 RX ORDER — PREGABALIN 50 MG/1
50 CAPSULE ORAL 2 TIMES DAILY
Qty: 60 CAPSULE | Refills: 0 | Status: SHIPPED | OUTPATIENT
Start: 2023-12-07 | End: 2023-12-17 | Stop reason: SDUPTHER

## 2023-12-07 NOTE — TELEPHONE ENCOUNTER
Lyrica was prescribed today and sent to [facility] at [place] [address] in [place], [place].

## 2023-12-07 NOTE — TELEPHONE ENCOUNTER
----- Message from Martine Canales sent at 12/7/2023  1:36 PM CST -----  Regarding: refill  Type:  RX Refill Request    Who Called: lauren LopezDigitalAdvisor one) m[place] (mikey)    Refill or New Rx:refill     RX Name and Strength:pregabalin (LYRICA) 50 MG capsule    How is the patient currently taking it? (ex. 1XDay):one day    Is this a 30 day or 90 day RX:30     Preferred Pharmacy with phone number:[facility]    Local or Mail Order:local    Ordering Provider:yola    Would the patient rather a call back? Yes if needed     Best Call Back Number:[phone]     Additional Information: please send Rx electronically  to [facility] pharmacy

## 2023-12-11 DIAGNOSIS — E11.65 TYPE 2 DIABETES MELLITUS WITH HYPERGLYCEMIA, WITH LONG-TERM CURRENT USE OF INSULIN: Chronic | ICD-10-CM

## 2023-12-11 DIAGNOSIS — Z79.4 TYPE 2 DIABETES MELLITUS WITH HYPERGLYCEMIA, WITH LONG-TERM CURRENT USE OF INSULIN: Chronic | ICD-10-CM

## 2023-12-11 RX ORDER — CALCIUM CITRATE/VITAMIN D3 200MG-6.25
1 TABLET ORAL 4 TIMES DAILY
Qty: 200 EACH | Refills: 2 | Status: SHIPPED | OUTPATIENT
Start: 2023-12-11 | End: 2023-12-11 | Stop reason: SDUPTHER

## 2023-12-11 RX ORDER — CALCIUM CITRATE/VITAMIN D3 200MG-6.25
1 TABLET ORAL 4 TIMES DAILY
Qty: 200 EACH | Refills: 2 | Status: SHIPPED | OUTPATIENT
Start: 2023-12-11 | End: 2024-02-01 | Stop reason: SDUPTHER

## 2023-12-17 DIAGNOSIS — E11.42 TYPE 2 DIABETES MELLITUS WITH DIABETIC POLYNEUROPATHY, WITH LONG-TERM CURRENT USE OF INSULIN: Primary | ICD-10-CM

## 2023-12-17 DIAGNOSIS — Z79.4 TYPE 2 DIABETES MELLITUS WITH DIABETIC POLYNEUROPATHY, WITH LONG-TERM CURRENT USE OF INSULIN: Primary | ICD-10-CM

## 2023-12-18 RX ORDER — PREGABALIN 50 MG/1
50 CAPSULE ORAL 2 TIMES DAILY
Qty: 60 CAPSULE | Refills: 0 | Status: SHIPPED | OUTPATIENT
Start: 2023-12-18 | End: 2024-01-03 | Stop reason: SDUPTHER

## 2024-01-02 ENCOUNTER — OFFICE VISIT (OUTPATIENT)
Dept: FAMILY MEDICINE | Facility: CLINIC | Age: 59
End: 2024-01-02
Payer: COMMERCIAL

## 2024-01-02 VITALS
TEMPERATURE: 98 F | RESPIRATION RATE: 20 BRPM | BODY MASS INDEX: 42.01 KG/M2 | HEIGHT: 60 IN | DIASTOLIC BLOOD PRESSURE: 75 MMHG | OXYGEN SATURATION: 98 % | HEART RATE: 81 BPM | SYSTOLIC BLOOD PRESSURE: 129 MMHG | WEIGHT: 214 LBS

## 2024-01-02 DIAGNOSIS — Z23 NEED FOR SHINGLES VACCINE: ICD-10-CM

## 2024-01-02 DIAGNOSIS — E11.42 DIABETIC POLYNEUROPATHY ASSOCIATED WITH TYPE 2 DIABETES MELLITUS: ICD-10-CM

## 2024-01-02 DIAGNOSIS — N18.31 CHRONIC KIDNEY DISEASE, STAGE 3A: Chronic | ICD-10-CM

## 2024-01-02 DIAGNOSIS — Z23 NEED FOR PNEUMOCOCCAL VACCINATION: ICD-10-CM

## 2024-01-02 DIAGNOSIS — F41.9 ANXIETY AND DEPRESSION: ICD-10-CM

## 2024-01-02 DIAGNOSIS — F32.A ANXIETY AND DEPRESSION: ICD-10-CM

## 2024-01-02 DIAGNOSIS — I10 PRIMARY HYPERTENSION: Chronic | ICD-10-CM

## 2024-01-02 DIAGNOSIS — E11.42 TYPE 2 DIABETES MELLITUS WITH DIABETIC POLYNEUROPATHY, WITH LONG-TERM CURRENT USE OF INSULIN: ICD-10-CM

## 2024-01-02 DIAGNOSIS — Z79.899 HIGH RISK MEDICATION USE: ICD-10-CM

## 2024-01-02 DIAGNOSIS — D64.9 ANEMIA, UNSPECIFIED TYPE: ICD-10-CM

## 2024-01-02 DIAGNOSIS — G47.00 INSOMNIA, UNSPECIFIED TYPE: ICD-10-CM

## 2024-01-02 DIAGNOSIS — E11.65 TYPE 2 DIABETES MELLITUS WITH HYPERGLYCEMIA, WITH LONG-TERM CURRENT USE OF INSULIN: Chronic | ICD-10-CM

## 2024-01-02 DIAGNOSIS — Z79.4 TYPE 2 DIABETES MELLITUS WITH DIABETIC POLYNEUROPATHY, WITH LONG-TERM CURRENT USE OF INSULIN: ICD-10-CM

## 2024-01-02 DIAGNOSIS — E78.2 MIXED HYPERLIPIDEMIA: Chronic | ICD-10-CM

## 2024-01-02 DIAGNOSIS — Z00.00 ANNUAL PHYSICAL EXAM: Primary | ICD-10-CM

## 2024-01-02 DIAGNOSIS — I48.91 ATRIAL FIBRILLATION, UNSPECIFIED TYPE: Chronic | ICD-10-CM

## 2024-01-02 DIAGNOSIS — E03.4 ACQUIRED ATROPHY OF THYROID: Chronic | ICD-10-CM

## 2024-01-02 DIAGNOSIS — Z79.4 TYPE 2 DIABETES MELLITUS WITH HYPERGLYCEMIA, WITH LONG-TERM CURRENT USE OF INSULIN: Chronic | ICD-10-CM

## 2024-01-02 DIAGNOSIS — I25.118 CORONARY ARTERY DISEASE OF NATIVE ARTERY OF NATIVE HEART WITH STABLE ANGINA PECTORIS: Chronic | ICD-10-CM

## 2024-01-02 DIAGNOSIS — Z95.1 S/P CABG (CORONARY ARTERY BYPASS GRAFT): Chronic | ICD-10-CM

## 2024-01-02 LAB
AMPHET UR QL SCN: NEGATIVE
APPEARANCE UR: CLEAR
BACTERIA #/AREA URNS AUTO: ABNORMAL /HPF
BARBITURATE SCN PRESENT UR: NEGATIVE
BENZODIAZ UR QL SCN: NEGATIVE
BILIRUB UR QL STRIP.AUTO: NEGATIVE
CANNABINOIDS UR QL SCN: NEGATIVE
COCAINE UR QL SCN: NEGATIVE
COLOR UR AUTO: ABNORMAL
FENTANYL UR QL SCN: NEGATIVE
GLUCOSE UR QL STRIP.AUTO: ABNORMAL
HYALINE CASTS #/AREA URNS LPF: ABNORMAL /LPF
KETONES UR QL STRIP.AUTO: NEGATIVE
LEUKOCYTE ESTERASE UR QL STRIP.AUTO: 75
MDMA UR QL SCN: NEGATIVE
NITRITE UR QL STRIP.AUTO: NEGATIVE
OPIATES UR QL SCN: NEGATIVE
PCP UR QL: NEGATIVE
PH UR STRIP.AUTO: 5.5 [PH]
PH UR: 5.5 [PH] (ref 3–11)
PROT UR QL STRIP.AUTO: ABNORMAL
RBC #/AREA URNS AUTO: ABNORMAL /HPF
RBC UR QL AUTO: NEGATIVE
SP GR UR STRIP.AUTO: 1.02 (ref 1–1.03)
SPECIFIC GRAVITY, URINE AUTO (.000) (OHS): 1.02 (ref 1–1.03)
SQUAMOUS #/AREA URNS LPF: ABNORMAL /HPF
UROBILINOGEN UR STRIP-ACNC: NORMAL
WBC #/AREA URNS AUTO: ABNORMAL /HPF

## 2024-01-02 PROCEDURE — 99396 PREV VISIT EST AGE 40-64: CPT | Mod: 25,,, | Performed by: STUDENT IN AN ORGANIZED HEALTH CARE EDUCATION/TRAINING PROGRAM

## 2024-01-02 PROCEDURE — 90471 IMMUNIZATION ADMIN: CPT | Mod: ,,, | Performed by: STUDENT IN AN ORGANIZED HEALTH CARE EDUCATION/TRAINING PROGRAM

## 2024-01-02 PROCEDURE — 90472 IMMUNIZATION ADMIN EACH ADD: CPT | Mod: ,,, | Performed by: STUDENT IN AN ORGANIZED HEALTH CARE EDUCATION/TRAINING PROGRAM

## 2024-01-02 PROCEDURE — 90677 PCV20 VACCINE IM: CPT | Mod: ,,, | Performed by: STUDENT IN AN ORGANIZED HEALTH CARE EDUCATION/TRAINING PROGRAM

## 2024-01-02 PROCEDURE — 90750 HZV VACC RECOMBINANT IM: CPT | Mod: ,,, | Performed by: STUDENT IN AN ORGANIZED HEALTH CARE EDUCATION/TRAINING PROGRAM

## 2024-01-02 RX ORDER — PREGABALIN 50 MG/1
50 CAPSULE ORAL 2 TIMES DAILY
Qty: 60 CAPSULE | Refills: 0 | Status: CANCELLED | OUTPATIENT
Start: 2024-01-02 | End: 2024-07-02

## 2024-01-02 NOTE — ASSESSMENT & PLAN NOTE
- High-Risk Medication Agreement signed by patient on 06/22/23.  - UDS for routine monitoring.  - Continue Lyrica 50mg BID.

## 2024-01-02 NOTE — PROGRESS NOTES
Subjective:      Patient ID: Cleopatra Campos is a 58 y.o.  female.  Patient accompanied by her , Mr. Aj Campos.     Chief Complaint: Wellness    Preventative Health: Patient amenable to pneumonia and shingles vaccines. Patient had a hysterectomy unrelated to CA. She denies any history of abnormal Pap smears.      IDDMII w/Neuropathy: A1c 7.0 from 01/20/23. Patient checks her blood sugars 3-4 daily. Patient giving herself 100U BID of Levemir. She gives herself Trulicity on Wednesdays. Patient following with Optometry for her eye exams. Neuropathy located to fingers, feet, and toes. She is taking Lyrica BID.     A-Fib/HTN/HLD/CAD/Hx of CABG: Patient following with Dr. Conner Light with Cardiology. Patient taking Lipitor, Eliquis, Fenofibrate, Losartan, Metoprolol, and Verapamil.     CKDIII: Patient following with Dr. Jordan Gastelum with Nephrology.     Hypothyroidism: Patient taking Synthroid.     Anxiety/Depression/Insomnia: Patient taking Cymbalta and Vistaril. Patient stopped taking Ambien because it was hurting her stomach. She tried Trazodone in the past, but stopped due to side effects.    Review of Systems   Constitutional:  Negative for activity change, chills, diaphoresis, fatigue and fever.   Eyes:  Negative for visual disturbance.   Respiratory:  Negative for apnea, cough and shortness of breath.    Cardiovascular:  Positive for leg swelling (ankles). Negative for chest pain and palpitations.   Gastrointestinal:  Negative for abdominal pain, blood in stool, diarrhea, nausea and vomiting.   Genitourinary:  Negative for dysuria and hematuria.   Musculoskeletal:  Negative for arthralgias, back pain and myalgias.   Skin:  Negative for rash and wound.   Neurological:  Positive for numbness (neuropathy). Negative for dizziness, syncope, weakness and headaches.   Psychiatric/Behavioral:  Positive for sleep disturbance. Negative for behavioral problems, dysphoric mood and hallucinations. The  patient is not nervous/anxious.      Objective:   /75   Pulse 81   Temp 98 °F (36.7 °C)   Resp 20   Ht 5' (1.524 m)   Wt 97.1 kg (214 lb)   SpO2 98%   BMI 41.79 kg/m²     Physical Exam  Vitals and nursing note reviewed.   Constitutional:       General: She is not in acute distress.     Appearance: Normal appearance. She is obese. She is not ill-appearing or toxic-appearing.   HENT:      Head: Normocephalic and atraumatic.      Mouth/Throat:      Mouth: Mucous membranes are moist.      Pharynx: Oropharynx is clear.   Eyes:      Conjunctiva/sclera: Conjunctivae normal.   Cardiovascular:      Rate and Rhythm: Normal rate and regular rhythm.      Pulses:           Dorsalis pedis pulses are 3+ on the right side and 3+ on the left side.      Heart sounds: Normal heart sounds. No murmur heard.  Pulmonary:      Effort: Pulmonary effort is normal. No respiratory distress.      Breath sounds: Normal breath sounds.   Abdominal:      General: There is no distension.      Palpations: Abdomen is soft.      Tenderness: There is no abdominal tenderness.   Musculoskeletal:         General: No deformity.      Cervical back: Neck supple. No tenderness.      Right lower leg: Edema (isolated to ankle and non-pitting) present.      Left lower leg: Edema (isolated to ankle and non-pitting) present.   Feet:      Right foot:      Protective Sensation: 10 sites tested.  10 sites sensed.      Skin integrity: Skin integrity normal.      Toenail Condition: Right toenails are normal.      Left foot:      Protective Sensation: 10 sites tested.  10 sites sensed.      Skin integrity: Skin integrity normal.      Toenail Condition: Left toenails are normal.   Lymphadenopathy:      Cervical: No cervical adenopathy.   Skin:     General: Skin is warm and dry.      Findings: No lesion or rash.   Neurological:      General: No focal deficit present.      Mental Status: She is alert. Mental status is at baseline.      Motor: No weakness.       Coordination: Coordination normal.   Psychiatric:         Mood and Affect: Mood normal.         Behavior: Behavior normal.         Thought Content: Thought content normal.         Judgment: Judgment normal.       Assessment/Plan:   1. Annual physical exam  Comments:  - Health maintenance reviewed.  - Will need to infer about LDCT at follow-up.    2. Need for pneumococcal vaccination  -     (In Office Administered) Pneumococcal Conjugate Vaccine (20 Valent) (IM) (Preferred)    3. Need for shingles vaccine  -     Zoster Recombinant Vaccine    4. Type 2 diabetes mellitus with diabetic polyneuropathy, with long-term current use of insulin  -     CBC Auto Differential; Future; Expected date: 01/02/2024  -     Comprehensive Metabolic Panel; Future; Expected date: 01/02/2024  -     Hemoglobin A1C; Future; Expected date: 01/02/2024    5. Type 2 diabetes mellitus with hyperglycemia, with long-term current use of insulin  Assessment & Plan:  - Labs for routine monitoring.  - Continue glucose monitoring.  - Continue Levemir 100U BID and Trulicity 3mg weekly.  - Patient following with Optometry for her eye exams.  - Diabetic foot exam performed.      6. Diabetic polyneuropathy associated with type 2 diabetes mellitus  Assessment & Plan:  - High-Risk Medication Agreement signed by patient on 06/22/23.  - UDS for routine monitoring.  - Continue Lyrica 50mg BID.      7. High risk medication use  -     Drug Screen, Urine    8. Atrial fibrillation, unspecified type  Assessment & Plan:  - Stable.  - Patient following with Dr. Conner Light with Cardiology.   - Continue Eliquis 5mg BID and Metoprolol Tartrate 50mg BID.      9. Primary hypertension  Assessment & Plan:  - BP well-controlled.  - Patient following with Dr. Conner Light with Cardiology.   - Continue Losartan 100mg daily, Metoprolol 50mg BID, and Verapamil 40mg TID.       10. Mixed hyperlipidemia  Assessment & Plan:  - Continue Lipitor 80mg daily and Fenofibrate 160mg  daily.    Orders:  -     Lipid Panel; Future; Expected date: 01/02/2024    11. Coronary artery disease of native artery of native heart with stable angina pectoris  Assessment & Plan:  - S/p CABG  - Patient following with Dr. Conner Light with Cardiology.   - Refer to A-fib, HTN, and HLD plans.      12. S/P CABG (coronary artery bypass graft)  Assessment & Plan:  - Refer to CAD plan.      13. Chronic kidney disease, stage 3a  Assessment & Plan:  - Patient following with Dr. Jordan Gastelum with Nephrology.      14. Acquired atrophy of thyroid  Assessment & Plan:  - Continue Synthroid 100mcg daily.    Orders:  -     TSH; Future; Expected date: 01/02/2024    15. Anxiety and depression  Assessment & Plan:  - Mood stable.  - Continue Cymbalta 30mg daily.      16. Insomnia, unspecified type  Assessment & Plan:  - Stable.  - Patient stopped taking Ambien in the past because it was hurting her stomach.   - She tried Trazodone in the past, but stopped due to side effects.  - Continue Vistaril 25mg nightly PRN.      17. Anemia, unspecified type  -     Iron and TIBC; Future; Expected date: 01/02/2024  -     Ferritin; Future; Expected date: 01/02/2024  -     Path Review, Peripheral Smear; Future; Expected date: 01/02/2024       Follow up in about 3 months (around 4/2/2024) for Chronic Medical Management.

## 2024-01-02 NOTE — ASSESSMENT & PLAN NOTE
- Labs for routine monitoring.  - Continue glucose monitoring.  - Continue Levemir 100U BID and Trulicity 3mg weekly.  - Patient following with Optometry for her eye exams.  - Diabetic foot exam performed.

## 2024-01-02 NOTE — ASSESSMENT & PLAN NOTE
- Stable.  - Patient stopped taking Ambien in the past because it was hurting her stomach.   - She tried Trazodone in the past, but stopped due to side effects.  - Continue Vistaril 25mg nightly PRN.

## 2024-01-03 DIAGNOSIS — Z79.4 TYPE 2 DIABETES MELLITUS WITH DIABETIC POLYNEUROPATHY, WITH LONG-TERM CURRENT USE OF INSULIN: Primary | ICD-10-CM

## 2024-01-03 DIAGNOSIS — E11.42 TYPE 2 DIABETES MELLITUS WITH DIABETIC POLYNEUROPATHY, WITH LONG-TERM CURRENT USE OF INSULIN: Primary | ICD-10-CM

## 2024-01-03 RX ORDER — PREGABALIN 50 MG/1
50 CAPSULE ORAL 2 TIMES DAILY
Qty: 60 CAPSULE | Refills: 2 | Status: SHIPPED | OUTPATIENT
Start: 2024-01-03 | End: 2024-07-03

## 2024-01-22 ENCOUNTER — TELEPHONE (OUTPATIENT)
Dept: FAMILY MEDICINE | Facility: CLINIC | Age: 59
End: 2024-01-22
Payer: COMMERCIAL

## 2024-01-22 NOTE — TELEPHONE ENCOUNTER
----- Message from Dacia Salinas sent at 1/22/2024 11:26 AM CST -----  Regarding: pharmacy call  Type:  Pharmacy Calling to Clarify an RX    Name of Caller:myriam  Pharmacy Name:[facility]  Prescription Name:dulaglutide (TRULICITY) 3 mg/0.5 mL pen injector   What do they need to clarify?:dx code  Best Call Back Number:[phone]  Additional Information: needing dx code on meds. Please advise

## 2024-02-01 DIAGNOSIS — E11.65 TYPE 2 DIABETES MELLITUS WITH HYPERGLYCEMIA, WITH LONG-TERM CURRENT USE OF INSULIN: Primary | Chronic | ICD-10-CM

## 2024-02-01 DIAGNOSIS — E03.4 ACQUIRED ATROPHY OF THYROID: Chronic | ICD-10-CM

## 2024-02-01 DIAGNOSIS — Z79.4 TYPE 2 DIABETES MELLITUS WITH HYPERGLYCEMIA, WITH LONG-TERM CURRENT USE OF INSULIN: Primary | Chronic | ICD-10-CM

## 2024-02-01 RX ORDER — LEVOTHYROXINE SODIUM 100 UG/1
100 TABLET ORAL EVERY MORNING
Qty: 90 TABLET | Refills: 0 | Status: SHIPPED | OUTPATIENT
Start: 2024-02-01 | End: 2024-02-08 | Stop reason: SDUPTHER

## 2024-02-01 RX ORDER — INSULIN DETEMIR 100 [IU]/ML
100 INJECTION, SOLUTION SUBCUTANEOUS 2 TIMES DAILY
Qty: 360 ML | Refills: 1 | Status: SHIPPED | OUTPATIENT
Start: 2024-02-01 | End: 2024-02-27 | Stop reason: ALTCHOICE

## 2024-02-01 RX ORDER — CALCIUM CITRATE/VITAMIN D3 200MG-6.25
1 TABLET ORAL 4 TIMES DAILY
Qty: 200 EACH | Refills: 2 | Status: SHIPPED | OUTPATIENT
Start: 2024-02-01 | End: 2024-07-30

## 2024-02-08 DIAGNOSIS — I48.91 ATRIAL FIBRILLATION, UNSPECIFIED TYPE: Chronic | ICD-10-CM

## 2024-02-08 DIAGNOSIS — E03.4 ACQUIRED ATROPHY OF THYROID: Chronic | ICD-10-CM

## 2024-02-08 DIAGNOSIS — I10 HYPERTENSION, UNSPECIFIED TYPE: ICD-10-CM

## 2024-02-08 DIAGNOSIS — E78.2 MIXED HYPERLIPIDEMIA: Chronic | ICD-10-CM

## 2024-02-08 RX ORDER — APIXABAN 5 MG/1
5 TABLET, FILM COATED ORAL 2 TIMES DAILY
Qty: 180 TABLET | Refills: 3 | Status: CANCELLED | OUTPATIENT
Start: 2024-02-08

## 2024-02-09 RX ORDER — ATORVASTATIN CALCIUM 80 MG/1
80 TABLET, FILM COATED ORAL DAILY
Qty: 90 TABLET | Refills: 3 | Status: SHIPPED | OUTPATIENT
Start: 2024-02-09

## 2024-02-09 RX ORDER — APIXABAN 5 MG/1
5 TABLET, FILM COATED ORAL 2 TIMES DAILY
Qty: 180 TABLET | Refills: 3 | Status: SHIPPED | OUTPATIENT
Start: 2024-02-09

## 2024-02-09 RX ORDER — METOPROLOL TARTRATE 50 MG/1
50 TABLET ORAL 2 TIMES DAILY
Qty: 180 TABLET | Refills: 3 | Status: SHIPPED | OUTPATIENT
Start: 2024-02-09 | End: 2025-02-08

## 2024-02-09 RX ORDER — LEVOTHYROXINE SODIUM 100 UG/1
100 TABLET ORAL EVERY MORNING
Qty: 90 TABLET | Refills: 0 | Status: SHIPPED | OUTPATIENT
Start: 2024-02-09 | End: 2024-04-24 | Stop reason: SDUPTHER

## 2024-02-21 ENCOUNTER — TELEPHONE (OUTPATIENT)
Dept: FAMILY MEDICINE | Facility: CLINIC | Age: 59
End: 2024-02-21
Payer: COMMERCIAL

## 2024-02-21 NOTE — TELEPHONE ENCOUNTER
I spoke with the patient for clarification. She was under the impression that mounjaro is a type of insulin. Patient educated that it is not insulin. She stated that she would like to replace trulicity with mounjaro but she does need a replacement insulin as what she is currently taking (Levemir) is being discontinued according to her pharmacy.    Please advise on medication changes or if you'd like patient to schedule an appt to discuss

## 2024-02-21 NOTE — TELEPHONE ENCOUNTER
----- Message from Shreya Corona sent at 2/21/2024  1:18 PM CST -----  Type:  Needs Medical Advice    Who Called: pt     Best Call Back Number:  [phone]    Additional Information: pt called stated she was told by pharmacy that , insulin detemir U-100, Levemir, (LEVEMIR FLEXTOUCH U100 INSULIN) 100 unit/mL (3 mL) InPn pen, will be discontinued , would like to speak to nurse about prescribing mounjaro , please call pt to discuss

## 2024-02-27 ENCOUNTER — LAB VISIT (OUTPATIENT)
Dept: LAB | Facility: HOSPITAL | Age: 59
End: 2024-02-27
Attending: STUDENT IN AN ORGANIZED HEALTH CARE EDUCATION/TRAINING PROGRAM
Payer: COMMERCIAL

## 2024-02-27 ENCOUNTER — CLINICAL SUPPORT (OUTPATIENT)
Dept: FAMILY MEDICINE | Facility: CLINIC | Age: 59
End: 2024-02-27
Attending: STUDENT IN AN ORGANIZED HEALTH CARE EDUCATION/TRAINING PROGRAM
Payer: COMMERCIAL

## 2024-02-27 ENCOUNTER — OFFICE VISIT (OUTPATIENT)
Dept: FAMILY MEDICINE | Facility: CLINIC | Age: 59
End: 2024-02-27
Payer: COMMERCIAL

## 2024-02-27 VITALS
SYSTOLIC BLOOD PRESSURE: 130 MMHG | WEIGHT: 209 LBS | RESPIRATION RATE: 16 BRPM | DIASTOLIC BLOOD PRESSURE: 80 MMHG | TEMPERATURE: 98 F | HEIGHT: 60 IN | OXYGEN SATURATION: 97 % | HEART RATE: 98 BPM | BODY MASS INDEX: 41.03 KG/M2

## 2024-02-27 DIAGNOSIS — D64.9 ANEMIA, UNSPECIFIED TYPE: ICD-10-CM

## 2024-02-27 DIAGNOSIS — E11.42 TYPE 2 DIABETES MELLITUS WITH DIABETIC POLYNEUROPATHY, WITH LONG-TERM CURRENT USE OF INSULIN: ICD-10-CM

## 2024-02-27 DIAGNOSIS — E11.42 DIABETIC POLYNEUROPATHY ASSOCIATED WITH TYPE 2 DIABETES MELLITUS: ICD-10-CM

## 2024-02-27 DIAGNOSIS — Z79.4 TYPE 2 DIABETES MELLITUS WITH HYPERGLYCEMIA, WITH LONG-TERM CURRENT USE OF INSULIN: Primary | Chronic | ICD-10-CM

## 2024-02-27 DIAGNOSIS — E11.65 TYPE 2 DIABETES MELLITUS WITH HYPERGLYCEMIA, WITH LONG-TERM CURRENT USE OF INSULIN: Primary | Chronic | ICD-10-CM

## 2024-02-27 DIAGNOSIS — Z79.4 TYPE 2 DIABETES MELLITUS WITH HYPERGLYCEMIA, WITH LONG-TERM CURRENT USE OF INSULIN: Chronic | ICD-10-CM

## 2024-02-27 DIAGNOSIS — E03.4 ACQUIRED ATROPHY OF THYROID: Chronic | ICD-10-CM

## 2024-02-27 DIAGNOSIS — E78.2 MIXED HYPERLIPIDEMIA: Chronic | ICD-10-CM

## 2024-02-27 DIAGNOSIS — Z79.4 TYPE 2 DIABETES MELLITUS WITH DIABETIC POLYNEUROPATHY, WITH LONG-TERM CURRENT USE OF INSULIN: ICD-10-CM

## 2024-02-27 DIAGNOSIS — E11.65 TYPE 2 DIABETES MELLITUS WITH HYPERGLYCEMIA, WITH LONG-TERM CURRENT USE OF INSULIN: Chronic | ICD-10-CM

## 2024-02-27 LAB
ALBUMIN SERPL-MCNC: 3.7 G/DL (ref 3.4–4.8)
ALBUMIN/GLOB SERPL: 1 RATIO (ref 1.1–2)
ALP SERPL-CCNC: 117 UNIT/L (ref 40–150)
ALT SERPL-CCNC: 30 UNIT/L (ref 0–55)
AST SERPL-CCNC: 17 UNIT/L (ref 5–34)
BASOPHILS # BLD AUTO: 0.04 X10(3)/MCL
BASOPHILS NFR BLD AUTO: 0.4 %
BILIRUB SERPL-MCNC: 0.7 MG/DL
BUN SERPL-MCNC: 32 MG/DL (ref 9.8–20.1)
CALCIUM SERPL-MCNC: 9.7 MG/DL (ref 8.4–10.2)
CHLORIDE SERPL-SCNC: 105 MMOL/L (ref 98–107)
CHOLEST SERPL-MCNC: 194 MG/DL
CHOLEST/HDLC SERPL: 5 {RATIO} (ref 0–5)
CO2 SERPL-SCNC: 19 MMOL/L (ref 23–31)
CREAT SERPL-MCNC: 1.14 MG/DL (ref 0.55–1.02)
EOSINOPHIL # BLD AUTO: 0.19 X10(3)/MCL (ref 0–0.9)
EOSINOPHIL NFR BLD AUTO: 2 %
ERYTHROCYTE [DISTWIDTH] IN BLOOD BY AUTOMATED COUNT: 13.6 % (ref 11.5–17)
EST. AVERAGE GLUCOSE BLD GHB EST-MCNC: 248.9 MG/DL
FERRITIN SERPL-MCNC: 660.64 NG/ML (ref 4.63–204)
GFR SERPLBLD CREATININE-BSD FMLA CKD-EPI: 55 MLS/MIN/1.73/M2
GLOBULIN SER-MCNC: 3.8 GM/DL (ref 2.4–3.5)
GLUCOSE SERPL-MCNC: 355 MG/DL (ref 82–115)
HBA1C MFR BLD: 10.3 %
HCT VFR BLD AUTO: 35.8 % (ref 37–47)
HDLC SERPL-MCNC: 40 MG/DL (ref 35–60)
HGB BLD-MCNC: 12.1 G/DL (ref 12–16)
IMM GRANULOCYTES # BLD AUTO: 0.02 X10(3)/MCL (ref 0–0.04)
IMM GRANULOCYTES NFR BLD AUTO: 0.2 %
IRON SATN MFR SERPL: 51 % (ref 20–50)
IRON SERPL-MCNC: 135 UG/DL (ref 50–170)
LYMPHOCYTES # BLD AUTO: 3.91 X10(3)/MCL (ref 0.6–4.6)
LYMPHOCYTES NFR BLD AUTO: 41.7 %
MCH RBC QN AUTO: 30.3 PG (ref 27–31)
MCHC RBC AUTO-ENTMCNC: 33.8 G/DL (ref 33–36)
MCV RBC AUTO: 89.5 FL (ref 80–94)
MONOCYTES # BLD AUTO: 0.72 X10(3)/MCL (ref 0.1–1.3)
MONOCYTES NFR BLD AUTO: 7.7 %
NEUTROPHILS # BLD AUTO: 4.49 X10(3)/MCL (ref 2.1–9.2)
NEUTROPHILS NFR BLD AUTO: 48 %
NRBC BLD AUTO-RTO: 0 %
PLATELET # BLD AUTO: 318 X10(3)/MCL (ref 130–400)
PMV BLD AUTO: 9.7 FL (ref 7.4–10.4)
POTASSIUM SERPL-SCNC: 5.5 MMOL/L (ref 3.5–5.1)
PROT SERPL-MCNC: 7.5 GM/DL (ref 5.8–7.6)
RBC # BLD AUTO: 4 X10(6)/MCL (ref 4.2–5.4)
SODIUM SERPL-SCNC: 134 MMOL/L (ref 136–145)
TIBC SERPL-MCNC: 132 UG/DL (ref 70–310)
TIBC SERPL-MCNC: 267 UG/DL (ref 250–450)
TRANSFERRIN SERPL-MCNC: 246 MG/DL (ref 180–382)
TRIGL SERPL-MCNC: 433 MG/DL (ref 37–140)
TSH SERPL-ACNC: 2.39 UIU/ML (ref 0.35–4.94)
WBC # SPEC AUTO: 9.37 X10(3)/MCL (ref 4.5–11.5)

## 2024-02-27 PROCEDURE — 82728 ASSAY OF FERRITIN: CPT

## 2024-02-27 PROCEDURE — 83036 HEMOGLOBIN GLYCOSYLATED A1C: CPT

## 2024-02-27 PROCEDURE — 80053 COMPREHEN METABOLIC PANEL: CPT

## 2024-02-27 PROCEDURE — 36415 COLL VENOUS BLD VENIPUNCTURE: CPT

## 2024-02-27 PROCEDURE — 85025 COMPLETE CBC W/AUTO DIFF WBC: CPT

## 2024-02-27 PROCEDURE — 92228 IMG RTA DETC/MNTR DS PHY/QHP: CPT | Mod: TC,,, | Performed by: STUDENT IN AN ORGANIZED HEALTH CARE EDUCATION/TRAINING PROGRAM

## 2024-02-27 PROCEDURE — 99214 OFFICE O/P EST MOD 30 MIN: CPT | Mod: ,,, | Performed by: STUDENT IN AN ORGANIZED HEALTH CARE EDUCATION/TRAINING PROGRAM

## 2024-02-27 PROCEDURE — 84443 ASSAY THYROID STIM HORMONE: CPT

## 2024-02-27 PROCEDURE — 92228 IMG RTA DETC/MNTR DS PHY/QHP: CPT | Mod: 26,,, | Performed by: OPTOMETRIST

## 2024-02-27 PROCEDURE — 80061 LIPID PANEL: CPT

## 2024-02-27 PROCEDURE — 83540 ASSAY OF IRON: CPT

## 2024-02-27 RX ORDER — GABAPENTIN 100 MG/1
100 CAPSULE ORAL NIGHTLY PRN
Qty: 90 CAPSULE | Refills: 0 | Status: SHIPPED | OUTPATIENT
Start: 2024-02-27 | End: 2024-03-05 | Stop reason: SDUPTHER

## 2024-02-27 RX ORDER — TIRZEPATIDE 2.5 MG/.5ML
2.5 INJECTION, SOLUTION SUBCUTANEOUS
Qty: 4 PEN | Refills: 0 | Status: SHIPPED | OUTPATIENT
Start: 2024-02-27 | End: 2024-02-29 | Stop reason: ALTCHOICE

## 2024-02-27 RX ORDER — INSULIN GLARGINE 100 [IU]/ML
100 INJECTION, SOLUTION SUBCUTANEOUS 2 TIMES DAILY
Qty: 180 ML | Refills: 3 | Status: SHIPPED | OUTPATIENT
Start: 2024-02-27 | End: 2024-05-09

## 2024-02-27 NOTE — PROGRESS NOTES
Subjective:      Patient ID: Cleopatra Campos is a 58 y.o.  female. Patient accompanied by her , Mr. Aj Campos.      Chief Complaint: IDDMII w/Neuropathy    IDDMII w/Neuropathy: A1c 7.0 from 01/20/23. Patient checks her blood sugars 3-4 daily. Patient giving herself 100U BID of Levemir, but said she needs to switch her insulin because it was discontinued. She gives herself Trulicity on Wednesdays. Patient following with Optometry for her eye exams. Neuropathy located to fingers, feet, and toes. She is taking Lyrica BID, but says this is causing weight gain and is amenable to being switched to something else.    Review of Systems   Constitutional:  Negative for activity change, chills, diaphoresis, fatigue and fever.   Eyes:  Negative for visual disturbance.   Respiratory:  Negative for apnea, cough and shortness of breath.    Cardiovascular:  Positive for leg swelling (ankles). Negative for chest pain and palpitations.   Gastrointestinal:  Negative for abdominal pain, blood in stool, diarrhea, nausea and vomiting.   Genitourinary:  Negative for dysuria and hematuria.   Musculoskeletal:  Negative for arthralgias, back pain and myalgias.   Skin:  Negative for rash and wound.   Neurological:  Positive for numbness (neuropathy). Negative for dizziness, syncope, weakness and headaches.   Psychiatric/Behavioral:  Positive for sleep disturbance. Negative for behavioral problems, dysphoric mood and hallucinations. The patient is not nervous/anxious.      Objective:   /80 (BP Location: Left arm)   Pulse 98   Temp 97.5 °F (36.4 °C) (Temporal)   Resp 16   Ht 5' (1.524 m)   Wt 94.8 kg (209 lb)   SpO2 97%   BMI 40.82 kg/m²     Physical Exam  Vitals and nursing note reviewed.   Constitutional:       General: She is not in acute distress.     Appearance: Normal appearance. She is obese. She is not ill-appearing or toxic-appearing.   HENT:      Head: Normocephalic and atraumatic.      Mouth/Throat:       Mouth: Mucous membranes are moist.      Pharynx: Oropharynx is clear.   Eyes:      Conjunctiva/sclera: Conjunctivae normal.   Cardiovascular:      Rate and Rhythm: Normal rate and regular rhythm.      Heart sounds: Normal heart sounds. No murmur heard.  Pulmonary:      Effort: Pulmonary effort is normal. No respiratory distress.      Breath sounds: Normal breath sounds.   Abdominal:      General: There is no distension.      Palpations: Abdomen is soft.      Tenderness: There is no abdominal tenderness.   Musculoskeletal:         General: No deformity.      Cervical back: Neck supple. No tenderness.      Right lower leg: Edema (isolated to ankle and non-pitting) present.      Left lower leg: Edema (isolated to ankle and non-pitting) present.   Lymphadenopathy:      Cervical: No cervical adenopathy.   Skin:     General: Skin is warm and dry.      Findings: No lesion or rash.   Neurological:      General: No focal deficit present.      Mental Status: She is alert. Mental status is at baseline.      Motor: No weakness.      Coordination: Coordination normal.   Psychiatric:         Mood and Affect: Mood normal.         Behavior: Behavior normal.         Thought Content: Thought content normal.         Judgment: Judgment normal.       Assessment/Plan:   1. Type 2 diabetes mellitus with hyperglycemia, with long-term current use of insulin  Assessment & Plan:  - Labs pending.  - Continue glucose monitoring.  - Will switch from Levemir to Lantus.  - Will switch from Trulicity to Mounjaro.  - Diabetic eye exam performed in clinic.  - Diabetic foot exam UTD.    Orders:  -     Diabetic Eye Screening Photo; Future  -     tirzepatide (MOUNJARO) 2.5 mg/0.5 mL PnIj; Inject 2.5 mg into the skin every 7 days.  Dispense: 4 Pen; Refill: 0  -     insulin (LANTUS SOLOSTAR U-100 INSULIN) glargine 100 units/mL SubQ pen; Inject 100 Units into the skin 2 (two) times a day.  Dispense: 180 mL; Refill: 3    2. Diabetic polyneuropathy  associated with type 2 diabetes mellitus  Assessment & Plan:  - High-Risk Medication Agreement signed by patient on 06/22/23.  - UDS from 01/02/24 negative.  - Switching from Lyrica to Gabapentin.    Orders:  -     gabapentin (NEURONTIN) 100 MG capsule; Take 1 capsule (100 mg total) by mouth nightly as needed (neuropathy).  Dispense: 90 capsule; Refill: 0

## 2024-02-27 NOTE — ASSESSMENT & PLAN NOTE
- Labs pending.  - Continue glucose monitoring.  - Will switch from Levemir to Lantus.  - Will switch from Trulicity to Mounjaro.  - Diabetic eye exam performed in clinic.  - Diabetic foot exam UTD.

## 2024-02-27 NOTE — ASSESSMENT & PLAN NOTE
- High-Risk Medication Agreement signed by patient on 06/22/23.  - UDS from 01/02/24 negative.  - Switching from Lyrica to Gabapentin.

## 2024-02-27 NOTE — PROGRESS NOTES
Cleopatra Campos is a 58 y.o. female here for a diabetic eye screening with non-dilated fundus photos per Dr. Middleton.    Patient cooperative?: Yes  Small pupils?: No  Last eye exam: N/A    For exam results, see Encounter Report.

## 2024-02-29 ENCOUNTER — TELEPHONE (OUTPATIENT)
Dept: FAMILY MEDICINE | Facility: CLINIC | Age: 59
End: 2024-02-29
Payer: COMMERCIAL

## 2024-02-29 DIAGNOSIS — Z79.4 TYPE 2 DIABETES MELLITUS WITH HYPERGLYCEMIA, WITH LONG-TERM CURRENT USE OF INSULIN: Primary | Chronic | ICD-10-CM

## 2024-02-29 DIAGNOSIS — E87.5 HYPERKALEMIA: Primary | ICD-10-CM

## 2024-02-29 DIAGNOSIS — I10 PRIMARY HYPERTENSION: Chronic | ICD-10-CM

## 2024-02-29 DIAGNOSIS — E11.65 TYPE 2 DIABETES MELLITUS WITH HYPERGLYCEMIA, WITH LONG-TERM CURRENT USE OF INSULIN: Primary | Chronic | ICD-10-CM

## 2024-02-29 DIAGNOSIS — E11.65 TYPE 2 DIABETES MELLITUS WITH HYPERGLYCEMIA, WITH LONG-TERM CURRENT USE OF INSULIN: Chronic | ICD-10-CM

## 2024-02-29 DIAGNOSIS — N18.31 CHRONIC KIDNEY DISEASE, STAGE 3A: Chronic | ICD-10-CM

## 2024-02-29 DIAGNOSIS — Z79.4 TYPE 2 DIABETES MELLITUS WITH HYPERGLYCEMIA, WITH LONG-TERM CURRENT USE OF INSULIN: Chronic | ICD-10-CM

## 2024-02-29 LAB — VIEW PATHOLOGY REPORT (RELIAPATH): NORMAL

## 2024-02-29 RX ORDER — SEMAGLUTIDE 0.68 MG/ML
0.25 INJECTION, SOLUTION SUBCUTANEOUS
Qty: 3 ML | Refills: 0 | Status: SHIPPED | OUTPATIENT
Start: 2024-02-29 | End: 2024-03-25 | Stop reason: SDUPTHER

## 2024-02-29 RX ORDER — INSULIN ASPART INJECTION 100 [IU]/ML
100 INJECTION, SOLUTION SUBCUTANEOUS 2 TIMES DAILY
Qty: 20 PEN | Refills: 11 | Status: SHIPPED | OUTPATIENT
Start: 2024-02-29 | End: 2024-05-09

## 2024-02-29 NOTE — TELEPHONE ENCOUNTER
----- Message from Ragini Christiano sent at 2/29/2024  1:53 PM CST -----  Regarding: refill  Type:  RX Refill Request    Who Called: pt  Refill or New Rx:refill    RX Name and Strength:substitute for   tirzepatide (MOUNJARO) 2.5 mg/0.5 mL PnIj  insurance will not cover  Please prescribe ozempic   How is the patient currently taking it? (ex. 1XDay):1 week    RX Name and Strength:substitute for   insulin (LANTUS SOLOSTAR U-100 INSULIN) glargine 100 units/mL SubQ pen  Please prescribe fiasp  How is the patient currently taking it? (ex. 1XDay):      Preferred Pharmacy with phone number:[facility]  Local or Mail Order:local  Ordering Provider:dr salinas  Would the patient rather a call back or a response via [facility]? C/b  Best Call Back Number:[phone]    Additional Information: insurance will not cover tirzepatide (MOUNJARO) 2.5 mg/0.5 mL PnIjor lantus please prescrobe substitute medications that insurance company offer

## 2024-02-29 NOTE — TELEPHONE ENCOUNTER
Pt stated she received lab results through the portal. Pt stated her insurance discontinued the Levemir and is requesting Fiasp insulin. Please advise. Thanks

## 2024-02-29 NOTE — TELEPHONE ENCOUNTER
I have ordered the following medication. Please notify the patient.    Medications Ordered This Encounter   Medications    insulin aspart, niacinamide, (FIASP FLEXTOUCH U-100 INSULIN) 100 unit/mL (3 mL) InPn     Sig: Inject 100 Units into the skin 2 (two) times a day.     Dispense:  20 pen      Refill:  11

## 2024-02-29 NOTE — TELEPHONE ENCOUNTER
I have ordered the following medication. Please instruct patient to reach out to her insurance regarding Lantus alternatives. Please notify the patient.    Medications Ordered This Encounter   Medications    semaglutide (OZEMPIC) 0.25 mg or 0.5 mg (2 mg/3 mL) pen injector     Sig: Inject 0.25 mg into the skin every 7 days.     Dispense:  3 mL     Refill:  0     Please adjust for a 1-month supply.

## 2024-02-29 NOTE — TELEPHONE ENCOUNTER
Please see message below. Patient needing alternative rx. Ozempic listed as alternative   Also needing lantus alternative

## 2024-02-29 NOTE — TELEPHONE ENCOUNTER
----- Message from Maggie Panchal sent at 2/29/2024  2:59 PM CST -----  Regarding: call back  .Type:  Pharmacy Calling to Clarify an RX    Name of Caller:trudy  [facility]  Prescription Name:ozempic  What do they need to clarify?:needs verbal order  Best Call Back Number:[phone]  Additional Information:

## 2024-03-01 ENCOUNTER — TELEPHONE (OUTPATIENT)
Dept: FAMILY MEDICINE | Facility: CLINIC | Age: 59
End: 2024-03-01
Payer: COMMERCIAL

## 2024-03-01 NOTE — TELEPHONE ENCOUNTER
----- Message from Emily Elkins sent at 3/1/2024 10:53 AM CST -----  Regarding: pharmacy call  .Type:  Pharmacy Calling to Clarify an RX    Name of Caller:Anabell    Pharmacy Name:[id number] - [place], [place] - [address]   Phone: [phone]  Fax: [phone]      Prescription Name:insulin aspart, niacinamide, (FIASP FLEXTOUCH U-100 INSULIN) 100 unit/mL (3 mL) InPn 20 pen 11 2/29/2024 2/28/2025 No  Sig - Route: Inject 100 Units into the skin 2 (two) times a day. - Subcutaneous      What do they need to clarify?:Pharmacy needs a verbal; Escript is down.    Best Call Back Number:[phone]    Additional Information: Pharmacy needs a verbal; Escript is down; please advise. Thanks.

## 2024-03-05 DIAGNOSIS — E11.42 DIABETIC POLYNEUROPATHY ASSOCIATED WITH TYPE 2 DIABETES MELLITUS: ICD-10-CM

## 2024-03-05 RX ORDER — GABAPENTIN 100 MG/1
100 CAPSULE ORAL NIGHTLY PRN
Qty: 90 CAPSULE | Refills: 0 | Status: SHIPPED | OUTPATIENT
Start: 2024-03-05 | End: 2024-05-30

## 2024-03-05 NOTE — TELEPHONE ENCOUNTER
Please see the attached refill request. Most recent request signed 1 week ago failed to send to pharmacy

## 2024-03-19 DIAGNOSIS — I10 HYPERTENSION, UNSPECIFIED TYPE: ICD-10-CM

## 2024-03-19 DIAGNOSIS — I10 PRIMARY HYPERTENSION: Primary | Chronic | ICD-10-CM

## 2024-03-19 RX ORDER — LOSARTAN POTASSIUM 100 MG/1
100 TABLET ORAL DAILY
Qty: 90 TABLET | Refills: 3 | Status: SHIPPED | OUTPATIENT
Start: 2024-03-19

## 2024-03-25 DIAGNOSIS — E11.65 TYPE 2 DIABETES MELLITUS WITH HYPERGLYCEMIA, WITH LONG-TERM CURRENT USE OF INSULIN: Primary | Chronic | ICD-10-CM

## 2024-03-25 DIAGNOSIS — Z79.4 TYPE 2 DIABETES MELLITUS WITH HYPERGLYCEMIA, WITH LONG-TERM CURRENT USE OF INSULIN: Primary | Chronic | ICD-10-CM

## 2024-03-26 RX ORDER — SEMAGLUTIDE 0.68 MG/ML
0.5 INJECTION, SOLUTION SUBCUTANEOUS
Qty: 3 ML | Refills: 0 | Status: SHIPPED | OUTPATIENT
Start: 2024-03-26 | End: 2024-04-24 | Stop reason: SDUPTHER

## 2024-03-26 NOTE — TELEPHONE ENCOUNTER
----- Message from Rosanne Fredis sent at 3/26/2024 12:02 PM CDT -----  .Type:  Needs Medical Advice    Who Called: pt  Symptoms (please be specific):    How long has patient had these symptoms:    Pharmacy name and phone #:    Would the patient rather a call back or a response via [facility]?   Best Call Back Number:   Additional Information: Berkley pt said she wants to move to next dose

## 2024-04-24 DIAGNOSIS — E11.65 TYPE 2 DIABETES MELLITUS WITH HYPERGLYCEMIA, WITH LONG-TERM CURRENT USE OF INSULIN: Primary | Chronic | ICD-10-CM

## 2024-04-24 DIAGNOSIS — Z79.4 TYPE 2 DIABETES MELLITUS WITH HYPERGLYCEMIA, WITH LONG-TERM CURRENT USE OF INSULIN: Primary | Chronic | ICD-10-CM

## 2024-04-24 DIAGNOSIS — E03.4 ACQUIRED ATROPHY OF THYROID: Primary | Chronic | ICD-10-CM

## 2024-04-24 RX ORDER — SEMAGLUTIDE 0.68 MG/ML
0.5 INJECTION, SOLUTION SUBCUTANEOUS
Qty: 9 ML | Refills: 0 | Status: SHIPPED | OUTPATIENT
Start: 2024-04-24 | End: 2024-05-09 | Stop reason: DRUGHIGH

## 2024-04-24 RX ORDER — LEVOTHYROXINE SODIUM 100 UG/1
100 TABLET ORAL EVERY MORNING
Qty: 90 TABLET | Refills: 3 | Status: SHIPPED | OUTPATIENT
Start: 2024-04-24

## 2024-04-29 NOTE — TELEPHONE ENCOUNTER
----- Message from Aquilino Weinberg MD sent at 10/17/2022  1:32 PM CDT -----  Regarding: RE: med adv  Stop new medication. Assess for improvement of symptoms. Check back in in about 1 week - JA  ----- Message -----  From: London Torres LPN  Sent: 10/17/2022   1:29 PM CDT  To: Aquilino Weinberg MD  Subject: FW: med adv                                        ----- Message -----  From: Abigail Santos  Sent: 10/17/2022   1:26 PM CDT  To: Sultana Davis Staff  Subject: med adv                                          Type:  Needs Medical Advice    Who Called: Cleopatra  Symptoms (please be specific): vomiting, nausea, dizziness   How long has patient had these symptoms:  over the past weekend  Pharmacy name and phone #:  [facility] on [facility]  Would the patient rather a call back or a response via [facility]?   Best Call Back Number: [phone]  Additional Information: Patient states she is unable to tolerate traZODone (DESYREL) 100 MG tablet recently prescribed to her as taking a half a pill            [No Acute Distress] : no acute distress [Well Developed] : well developed [Normal Sclera/Conjunctiva] : normal sclera/conjunctiva [PERRL] : pupils equal round and reactive to light [Normal Oropharynx] : the oropharynx was normal [Normal TMs] : both tympanic membranes were normal [No Lymphadenopathy] : no lymphadenopathy [Thyroid Normal, No Nodules] : the thyroid was normal and there were no nodules present [No Edema] : there was no peripheral edema [No Extremity Clubbing/Cyanosis] : no extremity clubbing/cyanosis [Normal] : affect was normal and insight and judgment were intact

## 2024-05-09 ENCOUNTER — OFFICE VISIT (OUTPATIENT)
Dept: FAMILY MEDICINE | Facility: CLINIC | Age: 59
End: 2024-05-09
Payer: COMMERCIAL

## 2024-05-09 VITALS
HEIGHT: 60 IN | SYSTOLIC BLOOD PRESSURE: 120 MMHG | WEIGHT: 199.5 LBS | RESPIRATION RATE: 16 BRPM | HEART RATE: 79 BPM | DIASTOLIC BLOOD PRESSURE: 79 MMHG | BODY MASS INDEX: 39.17 KG/M2 | TEMPERATURE: 98 F | OXYGEN SATURATION: 98 %

## 2024-05-09 DIAGNOSIS — Z79.4 TYPE 2 DIABETES MELLITUS WITH HYPERGLYCEMIA, WITH LONG-TERM CURRENT USE OF INSULIN: Primary | Chronic | ICD-10-CM

## 2024-05-09 DIAGNOSIS — I25.118 CORONARY ARTERY DISEASE OF NATIVE ARTERY OF NATIVE HEART WITH STABLE ANGINA PECTORIS: Chronic | ICD-10-CM

## 2024-05-09 DIAGNOSIS — J30.9 ALLERGIC RHINITIS, UNSPECIFIED SEASONALITY, UNSPECIFIED TRIGGER: ICD-10-CM

## 2024-05-09 DIAGNOSIS — Z95.1 S/P CABG (CORONARY ARTERY BYPASS GRAFT): Chronic | ICD-10-CM

## 2024-05-09 DIAGNOSIS — R19.7 DIARRHEA, UNSPECIFIED TYPE: ICD-10-CM

## 2024-05-09 DIAGNOSIS — Z90.49 STATUS POST CHOLECYSTECTOMY: ICD-10-CM

## 2024-05-09 DIAGNOSIS — G47.33 OBSTRUCTIVE SLEEP APNEA SYNDROME: ICD-10-CM

## 2024-05-09 DIAGNOSIS — E87.5 HYPERKALEMIA: ICD-10-CM

## 2024-05-09 DIAGNOSIS — E78.2 MIXED HYPERLIPIDEMIA: Chronic | ICD-10-CM

## 2024-05-09 DIAGNOSIS — E11.65 TYPE 2 DIABETES MELLITUS WITH HYPERGLYCEMIA, WITH LONG-TERM CURRENT USE OF INSULIN: Primary | Chronic | ICD-10-CM

## 2024-05-09 PROCEDURE — 99214 OFFICE O/P EST MOD 30 MIN: CPT | Mod: ,,, | Performed by: STUDENT IN AN ORGANIZED HEALTH CARE EDUCATION/TRAINING PROGRAM

## 2024-05-09 RX ORDER — CHOLESTYRAMINE 4 G/9G
4 POWDER, FOR SUSPENSION ORAL
Qty: 270 PACKET | Refills: 3 | Status: SHIPPED | OUTPATIENT
Start: 2024-05-09 | End: 2025-05-09

## 2024-05-09 RX ORDER — ICOSAPENT ETHYL 1 G/1
2 CAPSULE ORAL 2 TIMES DAILY
Qty: 360 CAPSULE | Refills: 3 | Status: SHIPPED | OUTPATIENT
Start: 2024-05-09

## 2024-05-09 RX ORDER — INSULIN GLARGINE 300 [IU]/ML
10 INJECTION, SOLUTION SUBCUTANEOUS NIGHTLY
Qty: 3 ML | Refills: 0 | Status: SHIPPED | OUTPATIENT
Start: 2024-05-09 | End: 2024-05-31 | Stop reason: SDUPTHER

## 2024-05-09 NOTE — PROGRESS NOTES
Subjective:      Patient ID: Cleopatra Campos is a 58 y.o.  female. Patient accompanied by her , Mr. Aj Campos.      Chief Complaint: IDDMII    IDDMII w/Neuropathy: A1c 10.3 from 02/27/24.  Patient checking her sugars 4 times a day. Patient states she would like Toujeo Solostar 300U to be prescribed. She expresses the Fiasp is not effective. Patient gives herself Ozempic on Thursdays and asking for a dose increase.    Rhinorrhea: Patient states her nose has been runny for about 2 years since her CABG. Patient says Flonase hasn't been helping, but she's not consistent with it. Patient hasn't been taking a anti-histamine.    Diarrhea: Patient said this has been going on since she's had a cholecystectomy.     Review of Systems   Constitutional:  Negative for activity change and unexpected weight change.   HENT:  Positive for rhinorrhea. Negative for hearing loss and trouble swallowing.    Eyes:  Negative for discharge and visual disturbance.   Respiratory:  Negative for chest tightness and wheezing.    Cardiovascular:  Negative for chest pain and palpitations.   Gastrointestinal:  Positive for diarrhea. Negative for abdominal pain, blood in stool, constipation, nausea and vomiting.   Endocrine: Negative for polydipsia and polyuria.   Genitourinary:  Negative for difficulty urinating, dysuria, hematuria and menstrual problem.   Musculoskeletal:  Positive for arthralgias. Negative for joint swelling and neck pain.   Neurological:  Negative for weakness and headaches.   Psychiatric/Behavioral:  Negative for confusion and dysphoric mood.      Objective:   /79 (BP Location: Right arm)   Pulse 79   Temp 97.6 °F (36.4 °C) (Temporal)   Resp 16   Ht 5' (1.524 m)   Wt 90.5 kg (199 lb 8 oz)   SpO2 98%   BMI 38.96 kg/m²     Physical Exam  Vitals and nursing note reviewed.   Constitutional:       General: She is not in acute distress.     Appearance: Normal appearance. She is obese. She is not  ill-appearing or toxic-appearing.   HENT:      Head: Normocephalic and atraumatic.      Mouth/Throat:      Mouth: Mucous membranes are moist.      Pharynx: Oropharynx is clear. No oropharyngeal exudate or posterior oropharyngeal erythema.      Comments: Mucosal secretions at posterior oropharynx.  Eyes:      Conjunctiva/sclera: Conjunctivae normal.   Cardiovascular:      Rate and Rhythm: Normal rate and regular rhythm.      Heart sounds: Normal heart sounds. No murmur heard.  Pulmonary:      Effort: Pulmonary effort is normal. No respiratory distress.      Breath sounds: Normal breath sounds.   Abdominal:      General: There is no distension.      Palpations: Abdomen is soft.      Tenderness: There is no abdominal tenderness. There is no guarding or rebound.   Musculoskeletal:         General: No deformity.      Right lower leg: Edema (isolated to ankle and non-pitting) present.      Left lower leg: Edema (isolated to ankle and non-pitting) present.   Skin:     General: Skin is warm and dry.      Findings: No lesion or rash.   Neurological:      General: No focal deficit present.      Mental Status: She is alert. Mental status is at baseline.      Motor: No weakness.      Coordination: Coordination normal.   Psychiatric:         Mood and Affect: Mood normal.         Behavior: Behavior normal.         Thought Content: Thought content normal.         Judgment: Judgment normal.       Assessment/Plan:   1. Type 2 diabetes mellitus with hyperglycemia, with long-term current use of insulin  Assessment & Plan:  - A1c 10.3 from 02/27/24.  - Will need urine microalbumin/Cr ratio at follow-up.  - Diabetic eye exam UTD.  - Diabetic foot exam UTD.  - Will switch from Fiasp to Toujeo per patient request. Patient will start with 10U nightly and titrate by 2U nightly until goal. She will contact the clinic in 1 week with blood sugar readings.  - Increasing Ozempic from 0.5mg to 1mg weekly.  - Continue glucose  monitoring.    Orders:  -     insulin glargine, TOUJEO, (TOUJEO SOLOSTAR U-300 INSULIN) 300 unit/mL (1.5 mL) InPn pen; Inject 10 Units into the skin every evening. Please adjust for 3-month-supply.  Dispense: 3 mL; Refill: 0  -     semaglutide (OZEMPIC) 1 mg/dose (2 mg/1.5 mL) PnIj; Inject 1 mg into the skin every 7 days.  Dispense: 12 mL; Refill: 0    2. Allergic rhinitis, unspecified seasonality, unspecified trigger  Assessment & Plan:  - Recommend consistency with Flonase and anti-histamine OTC.  - If symptoms persist/worsen, will refer to ENT.      3. Status post cholecystectomy  Assessment & Plan:  - Patient experiencing diarrhea. Will start cholestyramine.  - If symptoms persist/worsen, will refer to GI.    Orders:  -     cholestyramine (QUESTRAN) 4 gram packet; Take 1 packet (4 g total) by mouth 3 (three) times daily with meals.  Dispense: 270 packet; Refill: 3    4. Diarrhea, unspecified type  -     cholestyramine (QUESTRAN) 4 gram packet; Take 1 packet (4 g total) by mouth 3 (three) times daily with meals.  Dispense: 270 packet; Refill: 3    5. Coronary artery disease of native artery of native heart with stable angina pectoris  Assessment & Plan:  - S/p CABG  - Patient following with Dr. Conner Light with Cardiology.   - Refer to A-fib, HTN, and HLD plans.    Orders:  -     icosapent ethyL (VASCEPA) 1 gram Cap; Take 2 capsules (2 g total) by mouth 2 (two) times daily.  Dispense: 360 capsule; Refill: 3    6. S/P CABG (coronary artery bypass graft)  Assessment & Plan:  - Refer to CAD plan.    Orders:  -     icosapent ethyL (VASCEPA) 1 gram Cap; Take 2 capsules (2 g total) by mouth 2 (two) times daily.  Dispense: 360 capsule; Refill: 3    7. Mixed hyperlipidemia  Assessment & Plan:  - Continue Lipitor 80mg daily and Fenofibrate 160mg daily.  - Starting Vascepa.    Orders:  -     icosapent ethyL (VASCEPA) 1 gram Cap; Take 2 capsules (2 g total) by mouth 2 (two) times daily.  Dispense: 360 capsule; Refill:  3    8. Obstructive sleep apnea syndrome  -     Ambulatory referral/consult to Sleep Disorders; Future; Expected date: 05/16/2024    9. Hyperkalemia  -     Comprehensive Metabolic Panel; Future; Expected date: 05/09/2024       Follow up in about 1 month (around 6/9/2024) for Chronic Medical Management.

## 2024-05-09 NOTE — ASSESSMENT & PLAN NOTE
- Recommend consistency with Flonase and anti-histamine OTC.  - If symptoms persist/worsen, will refer to ENT.

## 2024-05-09 NOTE — ASSESSMENT & PLAN NOTE
- A1c 10.3 from 02/27/24.  - Will need urine microalbumin/Cr ratio at follow-up.  - Diabetic eye exam UTD.  - Diabetic foot exam UTD.  - Will switch from Fiasp to Toujeo per patient request. Patient will start with 10U nightly and titrate by 2U nightly until goal. She will contact the clinic in 1 week with blood sugar readings.  - Increasing Ozempic from 0.5mg to 1mg weekly.  - Continue glucose monitoring.

## 2024-05-10 DIAGNOSIS — G47.00 INSOMNIA, UNSPECIFIED TYPE: Primary | ICD-10-CM

## 2024-05-13 RX ORDER — HYDROXYZINE PAMOATE 25 MG/1
25 CAPSULE ORAL NIGHTLY PRN
Qty: 90 CAPSULE | Refills: 3 | Status: SHIPPED | OUTPATIENT
Start: 2024-05-13 | End: 2024-05-31 | Stop reason: SDUPTHER

## 2024-05-15 ENCOUNTER — TELEPHONE (OUTPATIENT)
Dept: NEUROLOGY | Facility: CLINIC | Age: 59
End: 2024-05-15
Payer: COMMERCIAL

## 2024-05-30 DIAGNOSIS — E11.42 DIABETIC POLYNEUROPATHY ASSOCIATED WITH TYPE 2 DIABETES MELLITUS: ICD-10-CM

## 2024-05-30 RX ORDER — GABAPENTIN 100 MG/1
CAPSULE ORAL
Qty: 90 CAPSULE | Refills: 0 | Status: SHIPPED | OUTPATIENT
Start: 2024-05-30

## 2024-05-31 DIAGNOSIS — E11.65 TYPE 2 DIABETES MELLITUS WITH HYPERGLYCEMIA, WITH LONG-TERM CURRENT USE OF INSULIN: Chronic | ICD-10-CM

## 2024-05-31 DIAGNOSIS — G47.00 INSOMNIA, UNSPECIFIED TYPE: ICD-10-CM

## 2024-05-31 DIAGNOSIS — Z79.4 TYPE 2 DIABETES MELLITUS WITH HYPERGLYCEMIA, WITH LONG-TERM CURRENT USE OF INSULIN: Chronic | ICD-10-CM

## 2024-06-02 DIAGNOSIS — F41.9 ANXIETY AND DEPRESSION: Primary | ICD-10-CM

## 2024-06-02 DIAGNOSIS — F32.A ANXIETY AND DEPRESSION: Primary | ICD-10-CM

## 2024-06-03 ENCOUNTER — TELEPHONE (OUTPATIENT)
Dept: FAMILY MEDICINE | Facility: CLINIC | Age: 59
End: 2024-06-03

## 2024-06-03 RX ORDER — INSULIN GLARGINE 300 [IU]/ML
10 INJECTION, SOLUTION SUBCUTANEOUS NIGHTLY
Qty: 3 ML | Refills: 0 | Status: SHIPPED | OUTPATIENT
Start: 2024-06-03 | End: 2024-06-04 | Stop reason: SDUPTHER

## 2024-06-03 RX ORDER — DULOXETIN HYDROCHLORIDE 30 MG/1
30 CAPSULE, DELAYED RELEASE ORAL
Qty: 90 CAPSULE | Refills: 3 | Status: SHIPPED | OUTPATIENT
Start: 2024-06-03

## 2024-06-03 RX ORDER — HYDROXYZINE PAMOATE 25 MG/1
25 CAPSULE ORAL NIGHTLY PRN
Qty: 90 CAPSULE | Refills: 3 | Status: SHIPPED | OUTPATIENT
Start: 2024-06-03

## 2024-06-03 NOTE — TELEPHONE ENCOUNTER
I spoke with patient and she is out of insulin. She stated Dr. Middleton changed her insulin last month from Fiasp to Toujeo. She is currently taking 80 units bid. She stated at her last visit she was told to to take toujeo 10 units and then to increase it by 10 units everytime she checked her blood glucose level and it wasn't at goal. I advised her she was to take toujeo 10 units nightly and to increase by 2units everytime her blood glucose wasn't at goal (see note Dr. Middleton 5.9.2024). She hasn't had insulin (80 units) since last night. Her glucose level today at around 11am was 350. I advised her I would speak with Dr. Middleton and call her back today. She was also scheduled for a virtual visit tomorrow to discuss her insulin dosage.      Per Dr. Middleton verbal order for patient to  rx sent today of Toujeo and take 10 units tonight, then 10 units tomorrow morning; also, to report to the ER is blood glucose levels >400 or <60. Patient advised to also report to the ER is she has any severe symptoms or episodes of hyperglycemia or hypoglycemia, to also monitor her blood sugar levels. She voiced understanding regarding all the above. Patient aware to keep appointment virtually with Dr. Middleton tomorrow for 11:15am. [place]

## 2024-06-03 NOTE — TELEPHONE ENCOUNTER
----- Message from Mallika Wilson sent at 6/3/2024  4:05 PM CDT -----  Regarding: Medication Increase  .Who Called: Cleopatra Campos    Patient is returning phone call    Who Left Message for Patient:office staff  Does the patient know what this is regarding?:pt called in reference to her insulin.. informed patient that refill was sent to pharmacy today she states that she needs more the 10 units due to her sugar running high, she also states she's totally out of medication please advise       Preferred Method of Contact: Phone Call  Patient's Preferred Phone Number on File: [phone]   Best Call Back Number, if different:  Additional Information: medication increase of  insulin glargine, TOUJEO, (TOUJEO SOLOSTAR U-300 INSULIN) 300 unit/mL (1.5 mL) InPn pen

## 2024-06-03 NOTE — TELEPHONE ENCOUNTER
Patient is scheduled an appointment for 06/04/2024.    Matter has been turned over to Sheila Landis LPN our .

## 2024-06-04 ENCOUNTER — OFFICE VISIT (OUTPATIENT)
Dept: FAMILY MEDICINE | Facility: CLINIC | Age: 59
End: 2024-06-04
Payer: COMMERCIAL

## 2024-06-04 DIAGNOSIS — Z79.4 TYPE 2 DIABETES MELLITUS WITH HYPERGLYCEMIA, WITH LONG-TERM CURRENT USE OF INSULIN: Primary | Chronic | ICD-10-CM

## 2024-06-04 DIAGNOSIS — E11.65 TYPE 2 DIABETES MELLITUS WITH HYPERGLYCEMIA, WITH LONG-TERM CURRENT USE OF INSULIN: Primary | Chronic | ICD-10-CM

## 2024-06-04 PROCEDURE — 99213 OFFICE O/P EST LOW 20 MIN: CPT | Mod: 95,,, | Performed by: STUDENT IN AN ORGANIZED HEALTH CARE EDUCATION/TRAINING PROGRAM

## 2024-06-04 RX ORDER — INSULIN GLARGINE 300 [IU]/ML
20 INJECTION, SOLUTION SUBCUTANEOUS NIGHTLY
Qty: 6 ML | Refills: 0 | Status: SHIPPED | OUTPATIENT
Start: 2024-06-04 | End: 2025-06-04

## 2024-06-04 NOTE — PROGRESS NOTES
Subjective:      Patient ID: Cleopatra Campos is a 59 y.o.  female. Patient accompanied by her , Mr. Aj Campos.      Chief Complaint: Telemedicine Visit: IDDMII    IDDMII: A1c 10.3 from 02/27/24.  Patient checking her sugars 4 times a day. She said her fasting blood sugar was early 200s this morning. Patient states compliance with Toujeo Solostar 300U. She has been giving herself 10U nightly. She will continue to titrate by 2U nightly. Patient gives herself Ozempic on Thursdays.    Review of Systems   Constitutional:  Positive for fatigue.   Cardiovascular:  Negative for chest pain.   Endocrine: Positive for polydipsia, polyphagia and polyuria.   Skin:  Negative for pallor.   Neurological:  Negative for dizziness, tremors, seizures, speech difficulty, weakness and headaches.   Psychiatric/Behavioral:  Negative for confusion. The patient is not nervous/anxious.      Objective:   There were no vitals taken for this visit.    Physical Exam  Nursing note reviewed.   Constitutional:       General: She is not in acute distress.     Appearance: Normal appearance. She is not ill-appearing, toxic-appearing or diaphoretic.   Pulmonary:      Effort: No respiratory distress.   Neurological:      Mental Status: She is alert. Mental status is at baseline.   Psychiatric:         Mood and Affect: Mood normal.         Behavior: Behavior normal.         Thought Content: Thought content normal.         Judgment: Judgment normal.       Assessment/Plan:   1. Type 2 diabetes mellitus with hyperglycemia, with long-term current use of insulin  Assessment & Plan:  - A1c 10.3 from 02/27/24.  - Will need urine microalbumin/Cr ratio at follow-up.  - Diabetic eye exam UTD.  - Diabetic foot exam UTD.  - Patient will start with 10U nightly and titrate by 2U nightly until goal.   - Continue Ozempic 1mg weekly.  - Continue glucose monitoring.    Orders:  -     insulin glargine, TOUJEO, (TOUJEO SOLOSTAR U-300 INSULIN) 300 unit/mL  (1.5 mL) InPn pen; Inject 20 Units into the skin every evening. Please adjust for 3-month-supply.  Dispense: 6 mL; Refill: 0       This is a telemedicine note. Patient was treated using telemedicine, real time audio and video, according to [facility] protocols. Doreen ESCALANTE DO, conducted the visit from location identified below. The patient participated in the visit at a non-[facility] location selected by the patient (or patient's representative), identified below. I am licensed in a state where the patient stated they are located. The patient (or patient's representative) stated they understood and accepted the privacy and security risks to their information at their location. Patient was located at home.    Participant in the visit: Patient    I, distant provider, was located at: [facility]  [address] [place] [id number]    Face-to-face time spent with patient was 7 minutes, over 50% of which was used for education and counseling regarding medical conditions, current medications including risk/benefit and side effects/adverse events, over-the-counter medications-uses/doses, home self-care and contact precautions, and red flags and indications for immediate medical attention. The patient was receptive, expressed understanding, and was agreeable to plan. All questions answered.

## 2024-06-04 NOTE — ASSESSMENT & PLAN NOTE
- A1c 10.3 from 02/27/24.  - Will need urine microalbumin/Cr ratio at follow-up.  - Diabetic eye exam UTD.  - Diabetic foot exam UTD.  - Patient will start with 10U nightly and titrate by 2U nightly until goal. She will contact the clinic Friday with fasting blood sugar readings.  - Continue Ozempic 1mg weekly.  - Continue glucose monitoring.

## 2024-06-24 DIAGNOSIS — E11.65 TYPE 2 DIABETES MELLITUS WITH HYPERGLYCEMIA, WITH LONG-TERM CURRENT USE OF INSULIN: Chronic | ICD-10-CM

## 2024-06-24 DIAGNOSIS — Z79.4 TYPE 2 DIABETES MELLITUS WITH HYPERGLYCEMIA, WITH LONG-TERM CURRENT USE OF INSULIN: Chronic | ICD-10-CM

## 2024-06-24 RX ORDER — CALCIUM CITRATE/VITAMIN D3 200MG-6.25
1 TABLET ORAL 4 TIMES DAILY
Qty: 200 EACH | Refills: 2 | Status: SHIPPED | OUTPATIENT
Start: 2024-06-24 | End: 2024-12-21

## 2024-06-25 ENCOUNTER — E-VISIT (OUTPATIENT)
Dept: FAMILY MEDICINE | Facility: CLINIC | Age: 59
End: 2024-06-25
Payer: COMMERCIAL

## 2024-06-25 DIAGNOSIS — Z79.4 TYPE 2 DIABETES MELLITUS WITH HYPERGLYCEMIA, WITH LONG-TERM CURRENT USE OF INSULIN: Primary | Chronic | ICD-10-CM

## 2024-06-25 DIAGNOSIS — E11.65 TYPE 2 DIABETES MELLITUS WITH HYPERGLYCEMIA, WITH LONG-TERM CURRENT USE OF INSULIN: Primary | Chronic | ICD-10-CM

## 2024-06-26 RX ORDER — INSULIN GLARGINE 300 [IU]/ML
50 INJECTION, SOLUTION SUBCUTANEOUS NIGHTLY
Qty: 15 ML | Refills: 3 | Status: SHIPPED | OUTPATIENT
Start: 2024-06-26 | End: 2025-06-26

## 2024-06-26 NOTE — ASSESSMENT & PLAN NOTE
- A1c 10.3 from 02/27/24.  - Will need urine microalbumin/Cr ratio at follow-up.  - Diabetic eye exam UTD.  - Diabetic foot exam UTD.  - Patient will start with 50U nightly and titrate by 2U nightly until goal. She will contact the clinic next week with fasting blood sugar readings.  - Continue Ozempic 1mg weekly.  - Continue glucose monitoring.

## 2024-06-26 NOTE — PROGRESS NOTES
Patient ID: Cleopatra Campos is a 59 y.o.  female.    Chief Complaint: Medication Management (Entered automatically based on patient selection in Patient Portal.)    The patient initiated a request through [employer] on 6/25/2024 for evaluation and management with a chief complaint of Medication Management (Entered automatically based on patient selection in Patient Portal.)     I evaluated the questionnaire submission on 06/26/24.    Ohs Peq Evisit Medication    6/25/2024  1:16 PM CDT - Filed by Patient   Do you agree to participate in an E-Visit? Yes   If you have any of the following symptoms, please present to your local emergency room or call 911:  I acknowledge   Medication requests for narcotics will not be addressed via an E-Visit.  Please schedule an appointment. I acknowledge   Do you want to address a new or existing medication? I would like to address a medication I currently take   What is the main issue you would like addressed today? I need my doctor to see my three week blood sugar readings and amount of insulin taken at each reading. She wanted the reading so we can make adjustments to my insulin.   Would you like to change or continue your medication? Change medication   What medication would you like changed?  Toujeo   What is your current dose? 10 units at night, adding 2 units everytime my number is over 130. Im currently at 48 units at my last reading. Cyndee made a log for the last 3weeks if that log is needed.   How often do you take your medication? 1 time daily   Why do you need the medication changed? Medication does not work   What problem was the medication supposed to address? Type 2 diabetes   What effect has your medication had on your problem? Less than expected    What medical condition is the  medication intended to treat? Type 2 diabetes   Provide any additional information you feel is important.    Please attach any relevant images or files    Are you able to take your vital  signs? Yes   Systolic Blood Pressure: 131   Diastolic Blood Pressure: 70   Weight: 195   Height: 60   Pulse: 89   Temperature: 98.7   Respiration rate: 90   Pulse Oxygen: 98         Encounter Diagnosis   Name Primary?    Type 2 diabetes mellitus with hyperglycemia, with long-term current use of insulin Yes        No orders of the defined types were placed in this encounter.     Medications Ordered This Encounter   Medications    insulin glargine, TOUJEO, (TOUJEO SOLOSTAR U-300 INSULIN) 300 unit/mL (1.5 mL) InPn pen     Sig: Inject 50 Units into the skin every evening. Please adjust for 3-month-supply.     Dispense:  15 mL     Refill:  3          E-Visit Time Tracking:    Day 1 Time (in minutes): 10    Total Time (in minutes): 10

## 2024-07-12 DIAGNOSIS — E11.65 TYPE 2 DIABETES MELLITUS WITH HYPERGLYCEMIA, WITH LONG-TERM CURRENT USE OF INSULIN: Chronic | ICD-10-CM

## 2024-07-12 DIAGNOSIS — Z79.4 TYPE 2 DIABETES MELLITUS WITH HYPERGLYCEMIA, WITH LONG-TERM CURRENT USE OF INSULIN: Chronic | ICD-10-CM

## 2024-08-25 DIAGNOSIS — E11.42 DIABETIC POLYNEUROPATHY ASSOCIATED WITH TYPE 2 DIABETES MELLITUS: ICD-10-CM

## 2024-08-26 RX ORDER — GABAPENTIN 100 MG/1
CAPSULE ORAL
Qty: 90 CAPSULE | Refills: 0 | Status: SHIPPED | OUTPATIENT
Start: 2024-08-26

## 2024-09-05 ENCOUNTER — OFFICE VISIT (OUTPATIENT)
Dept: PRIMARY CARE CLINIC | Facility: CLINIC | Age: 59
End: 2024-09-05
Payer: COMMERCIAL

## 2024-09-05 VITALS
TEMPERATURE: 98 F | BODY MASS INDEX: 39.03 KG/M2 | SYSTOLIC BLOOD PRESSURE: 137 MMHG | DIASTOLIC BLOOD PRESSURE: 84 MMHG | HEIGHT: 60 IN | WEIGHT: 198.81 LBS | HEART RATE: 91 BPM | OXYGEN SATURATION: 97 %

## 2024-09-05 DIAGNOSIS — I10 PRIMARY HYPERTENSION: Chronic | ICD-10-CM

## 2024-09-05 DIAGNOSIS — N18.31 CHRONIC KIDNEY DISEASE, STAGE 3A: Chronic | ICD-10-CM

## 2024-09-05 DIAGNOSIS — Z79.4 TYPE 2 DIABETES MELLITUS WITH HYPERGLYCEMIA, WITH LONG-TERM CURRENT USE OF INSULIN: Chronic | ICD-10-CM

## 2024-09-05 DIAGNOSIS — E11.65 TYPE 2 DIABETES MELLITUS WITH HYPERGLYCEMIA, WITH LONG-TERM CURRENT USE OF INSULIN: Chronic | ICD-10-CM

## 2024-09-05 DIAGNOSIS — K52.9 CHRONIC DIARRHEA: Chronic | ICD-10-CM

## 2024-09-05 DIAGNOSIS — Z12.31 ENCOUNTER FOR SCREENING MAMMOGRAM FOR BREAST CANCER: ICD-10-CM

## 2024-09-05 DIAGNOSIS — Z12.2 ENCOUNTER FOR SCREENING FOR LUNG CANCER: ICD-10-CM

## 2024-09-05 DIAGNOSIS — Z76.89 ENCOUNTER TO ESTABLISH CARE WITH NEW DOCTOR: Primary | ICD-10-CM

## 2024-09-05 LAB
CREAT UR-MCNC: 167.4 MG/DL (ref 45–106)
HBA1C MFR BLD: 9.6 %
MICROALBUMIN UR-MCNC: 160.6 UG/ML
MICROALBUMIN/CREAT RATIO PNL UR: 95.9 MG/GM CR (ref 0–30)

## 2024-09-05 PROCEDURE — 82570 ASSAY OF URINE CREATININE: CPT | Performed by: STUDENT IN AN ORGANIZED HEALTH CARE EDUCATION/TRAINING PROGRAM

## 2024-09-05 PROCEDURE — 83036 HEMOGLOBIN GLYCOSYLATED A1C: CPT | Mod: QW,,, | Performed by: STUDENT IN AN ORGANIZED HEALTH CARE EDUCATION/TRAINING PROGRAM

## 2024-09-05 PROCEDURE — 82043 UR ALBUMIN QUANTITATIVE: CPT | Performed by: STUDENT IN AN ORGANIZED HEALTH CARE EDUCATION/TRAINING PROGRAM

## 2024-09-05 PROCEDURE — 99204 OFFICE O/P NEW MOD 45 MIN: CPT | Mod: ,,, | Performed by: STUDENT IN AN ORGANIZED HEALTH CARE EDUCATION/TRAINING PROGRAM

## 2024-09-05 RX ORDER — MONTELUKAST SODIUM 4 MG/1
1 TABLET, CHEWABLE ORAL 2 TIMES DAILY
Qty: 60 TABLET | Refills: 11 | Status: SHIPPED | OUTPATIENT
Start: 2024-09-05 | End: 2025-09-05

## 2024-09-05 RX ORDER — TIRZEPATIDE 2.5 MG/.5ML
2.5 INJECTION, SOLUTION SUBCUTANEOUS
Qty: 2 ML | Refills: 0 | Status: SHIPPED | OUTPATIENT
Start: 2024-09-05 | End: 2024-09-28 | Stop reason: SDUPTHER

## 2024-09-05 RX ORDER — GLIMEPIRIDE 4 MG/1
4 TABLET ORAL 2 TIMES DAILY
Qty: 180 TABLET | Refills: 3 | Status: SHIPPED | OUTPATIENT
Start: 2024-09-05 | End: 2025-09-05

## 2024-09-05 RX ORDER — TIRZEPATIDE 5 MG/.5ML
5 INJECTION, SOLUTION SUBCUTANEOUS
Qty: 2 ML | Refills: 0 | Status: SHIPPED | OUTPATIENT
Start: 2024-09-30 | End: 2024-10-03 | Stop reason: SDUPTHER

## 2024-09-05 RX ORDER — EMPAGLIFLOZIN, METFORMIN HYDROCHLORIDE 12.5; 1 MG/1; MG/1
2 TABLET, EXTENDED RELEASE ORAL DAILY
Qty: 60 TABLET | Refills: 11 | Status: SHIPPED | OUTPATIENT
Start: 2024-09-05 | End: 2024-09-25 | Stop reason: SDUPTHER

## 2024-09-05 NOTE — LETTER
AUTHORIZATION FOR RELEASE OF   CONFIDENTIAL INFORMATION    Dear Medical Records,    We are seeing Cleopatra Campos, date of birth 1965, in the clinic at [facility]. Analilia Romero MD is the patient's PCP. Cleopatra Campos has an outstanding lab/procedure at the time we reviewed her chart. In order to help keep her health information updated, she has authorized us to request the following medical record(s):        (X)  MAMMOGRAM                                      (  )  COLONOSCOPY      (  )  PAP SMEAR                                          (  )  OUTSIDE LAB RESULTS     (  )  DEXA SCAN                                          (  )  EYE EXAM            (  )  FOOT EXAM                                          (  )  ENTIRE RECORD     (  )  OUTSIDE IMMUNIZATIONS                 (  )  _______________         Please fax records to [facility], [place], Madelaine F, MD, [phone]     If you have any questions, please contact us at [phone].           Patient Name: Cleopatra Campos  : 1965  Patient Phone #: [phone]

## 2024-09-05 NOTE — LETTER
AUTHORIZATION FOR RELEASE OF   CONFIDENTIAL INFORMATION    Dear Medical Records,    We are seeing Cleopatra Campos, date of birth 1965, in [place] at [facility]. Analilia Romero MD is the patient's PCP. Cleopatra Campos has an outstanding lab/procedure at the time we reviewed her chart. In order to help keep her health information updated, she has authorized us to request the following medical record(s):        (  )  MAMMOGRAM                                      (  )  COLONOSCOPY      (  )  PAP SMEAR                                          (  )  OUTSIDE LAB RESULTS     (  )  DEXA SCAN                                          (  )  EYE EXAM            (  )  FOOT EXAM                                          (X)  ENTIRE RECORD     (  )  OUTSIDE IMMUNIZATIONS                 (  )  _______________         Please fax records to [facility], [place], Madelaine F, MD, [phone]     If you have any questions, please contact us at [phone].           Patient Name: Cleopatra Campos  : 1965  Patient Phone #: [phone]

## 2024-09-05 NOTE — LETTER
AUTHORIZATION FOR RELEASE OF   CONFIDENTIAL INFORMATION    Dear Medical Records,    We are seeing Cleopatra Campos, date of birth 1965, in [place] [facility]. Analilia Romero MD is the patient's PCP. Cleopatra Campos has an outstanding lab/procedure at the time we reviewed her chart. In order to help keep her health information updated, she has authorized us to request the following medical record(s):        (  )  MAMMOGRAM                                      (  )  COLONOSCOPY      (  )  PAP SMEAR                                          (  )  OUTSIDE LAB RESULTS     (  )  DEXA SCAN                                          (  )  EYE EXAM            (  )  FOOT EXAM                                          (X)  ENTIRE RECORD     (  )  OUTSIDE IMMUNIZATIONS                 (  )  _______________         Please fax records to [facility], [place], Madelaine F, MD, [phone]     If you have any questions, please contact us at [phone].           Patient Name: Cleopatra Campos  : 1965  Patient Phone #: [phone]

## 2024-09-05 NOTE — PROGRESS NOTES
Subjective:       Patient ID: Cleopatra Campos is a 59 y.o. female.    -------------------------------------    Clotting disorder    Coronary artery disease    Diabetes mellitus, type 2    DVT (deep venous thrombosis)    Heart attack    Hyperlipidemia    Hypertension    Hypothyroidism    Thyroid disease      Chief Complaint: Blood Sugar Problem (For about a year /Weight she was on ozempic )    Presents to the clinic to establish care.  Patient states that her sugars have been running extremely high.  POC A1c was 9.6. Also endorsed chronic diarrhea. Been going on before the metformin. Due for mammogram and lung cancer screening.    Diabetes  She has type 2 diabetes mellitus. No MedicAlert identification noted. The initial diagnosis of diabetes was made 20 years ago. Hypoglycemia symptoms include headaches and sleepiness. Pertinent negatives for hypoglycemia include no confusion, dizziness, hunger, mood changes, nervousness/anxiousness, pallor, seizures, speech difficulty, sweats or tremors. Associated symptoms include fatigue, foot paresthesias, polyuria and visual change. Pertinent negatives for diabetes include no blurred vision, no chest pain, no foot ulcerations, no polydipsia, no polyphagia, no weakness and no weight loss. Hypoglycemia complications include required assistance and required glucagon injection. Pertinent negatives for hypoglycemia complications include no blackouts, no hospitalization and no nocturnal hypoglycemia. Symptoms are stable. Diabetic complications include autonomic neuropathy, nephropathy, peripheral neuropathy, PVD and retinopathy. Pertinent negatives for diabetic complications include no CVA or heart disease. Risk factors for coronary artery disease include dyslipidemia, family history, hypertension, obesity and stress. Current diabetic treatment includes diet, insulin injections and oral agent (triple therapy). She is compliant with treatment most of the time. She is currently taking  insulin at bedtime. Insulin injections are given by patient. Rotation sites for injection include the abdominal wall and thighs. Her weight is fluctuating minimally. She is following a generally healthy diet. Meal planning includes calorie counting and carbohydrate counting. She has not had a previous visit with a dietitian. She participates in exercise intermittently. She monitors blood glucose at home 1-2 x per day. She monitors urine at home <1 x per month. Blood glucose monitoring compliance is excellent. Her home blood glucose trend is fluctuating minimally. She does not see a podiatrist.Eye exam is current.     Review of Systems   Constitutional:  Positive for fatigue. Negative for weight loss.   Eyes:  Negative for blurred vision.   Cardiovascular:  Negative for chest pain.   Gastrointestinal:  Positive for diarrhea.   Endocrine: Positive for polyuria. Negative for polydipsia and polyphagia.   Genitourinary:  Negative for dysuria and hematuria.   Integumentary:  Negative for pallor.   Neurological:  Positive for headaches. Negative for dizziness, tremors, seizures, speech difficulty and weakness.   Psychiatric/Behavioral:  Negative for confusion. The patient is not nervous/anxious.          Objective:      Physical Exam  Vitals and nursing note reviewed.   Constitutional:       General: She is not in acute distress.     Appearance: Normal appearance. She is not ill-appearing.   HENT:      Head: Normocephalic.      Right Ear: External ear normal.      Left Ear: External ear normal.      Nose: Nose normal. No rhinorrhea.      Mouth/Throat:      Mouth: Mucous membranes are moist.   Eyes:      General: No scleral icterus.     Conjunctiva/sclera: Conjunctivae normal.   Cardiovascular:      Rate and Rhythm: Normal rate and regular rhythm.   Pulmonary:      Effort: Pulmonary effort is normal. No respiratory distress.   Musculoskeletal:         General: No deformity.   Skin:     General: Skin is warm and dry.       Coloration: Skin is not jaundiced.   Neurological:      Mental Status: She is alert and oriented to person, place, and time. Mental status is at baseline.      Cranial Nerves: No cranial nerve deficit.      Gait: Gait normal.   Psychiatric:         Mood and Affect: Mood normal.         Behavior: Behavior normal.          Assessment & Plan:   1. Encounter to establish care with new doctor  Comments:  Reviewed medical history and reconciled medications  Reviewed labs   Requested records from previous provider/specialists    2. Type 2 diabetes mellitus with hyperglycemia, with long-term current use of insulin  Assessment & Plan:  Most recent A1c was 9.6, goal A1c <7.0%   Continue insulin   Start Synjardy, Amaryl and Mounjaro  Taper off of insulin as tolerated   Goal fasting glucose <100 >70 and post prandial glucose <180  Continue ACE-I and Statin   Continue ADA diet, Counseled on importance of diet & weight loss       Orders:  -     POCT HEMOGLOBIN A1C  -     Microalbumin/Creatinine Ratio, Urine; Future; Expected date: 09/05/2024  -     empagliflozin-metformin (SYNJARDY XR) 12.5-1,000 mg TBph; Take 2 tablets by mouth Daily.  Dispense: 60 tablet; Refill: 11  -     glimepiride (AMARYL) 4 MG tablet; Take 1 tablet (4 mg total) by mouth 2 (two) times a day.  Dispense: 180 tablet; Refill: 3  -    tirzepatide (MOUNJARO) 2.5 mg/0.5 mL PnIj; Inject 2.5 mg into the skin every 7 days.  Dispense: 2 mL; Refill: 0  -      tirzepatide (MOUNJARO) 5 mg/0.5 mL PnIj; Inject 5 mg into the skin every 7 days.  Dispense: 2 mL; Refill: 0  -     tirzepatide 7.5 mg/0.5 mL PnIj; Inject 7.5 mg into the skin every 7 days.  Dispense: 2 mL; Refill: 11  -     Ambulatory referral/consult to Diabetes Education; Future; Expected date: 09/05/2024  -     GAD65 Ab, Serum; Future; Expected date: 12/05/2024  -     Hemoglobin A1C; Future; Expected date: 12/05/2024  -     C-Peptide; Future; Expected date: 12/05/2024    3. Primary hypertension  Assessment &  Plan:  Today's BP WNL  Continue Losartan and Metoprolol  Counseled on low sodium diet, exercise, tobacco avoidance, and limiting alcohol intake   Pt. Has home BP cuff, instructed to measure home BP and report abnormal values         4. Chronic kidney disease, stage 3a  Assessment & Plan:  Stable   Defer to Nephrology   Follow low sodium diet (2 grams a day), control high blood pressure (goal <130/80), control diabetes (goal A1c <7.0%)   Decrease or stop alcohol joshua   Do not take NSAIDs (Ibuprofen, Naproxen, Aleve, Toradol, Mobic), may only take Tylenol as needed for pain/headaches   Take cholesterol- lowering medications as prescribed (goal LDL <100)       Orders:  -     Comprehensive Metabolic Panel; Future; Expected date: 12/05/2024    5. Chronic diarrhea  Assessment & Plan:  S/p cholecystectomy   Not metformin related   Start Colestid     Orders:  -     colestipoL (COLESTID) 1 gram Tab; Take 1 tablet (1 g total) by mouth 2 (two) times daily.  Dispense: 60 tablet; Refill: 11    6. Encounter for screening mammogram for breast cancer  -     Mammo Digital Screening Bilat w/ Leroy; Future; Expected date: 09/05/2024    7. Encounter for screening for lung cancer  -     CT Chest Lung Screening Low Dose; Future; Expected date: 09/05/2024    Follow up in about 3 months (around 12/5/2024) for HTN, Diabetes. In addition to their scheduled follow up, the patient has also been instructed to follow up on as needed basis.

## 2024-09-09 ENCOUNTER — TELEPHONE (OUTPATIENT)
Dept: PRIMARY CARE CLINIC | Facility: CLINIC | Age: 59
End: 2024-09-09
Payer: COMMERCIAL

## 2024-09-09 NOTE — TELEPHONE ENCOUNTER
----- Message from Sarah Delvalle sent at 9/6/2024 12:15 PM CDT -----  Regarding: call back  .Who Called: Cleopatra Campos    Caller is requesting assistance/information from provider's office.    Symptoms (please be specific):    How long has patient had these symptoms:    List of preferred pharmacies on file (remove unneeded): [unfilled]  If different, enter pharmacy into here including location and phone number:       Preferred Method of Contact: Phone Call  Patient's Preferred Phone Number on File: [phone]   Best Call Back Number, if different:  Additional Information: Nicole maria/[facility]  mammogram report was requested pt was never seen [phone] opt 3

## 2024-09-25 DIAGNOSIS — Z79.4 TYPE 2 DIABETES MELLITUS WITH HYPERGLYCEMIA, WITH LONG-TERM CURRENT USE OF INSULIN: Chronic | ICD-10-CM

## 2024-09-25 DIAGNOSIS — E11.65 TYPE 2 DIABETES MELLITUS WITH HYPERGLYCEMIA, WITH LONG-TERM CURRENT USE OF INSULIN: Chronic | ICD-10-CM

## 2024-09-26 RX ORDER — EMPAGLIFLOZIN, METFORMIN HYDROCHLORIDE 12.5; 1 MG/1; MG/1
2 TABLET, EXTENDED RELEASE ORAL DAILY
Qty: 60 TABLET | Refills: 11 | Status: SHIPPED | OUTPATIENT
Start: 2024-09-26 | End: 2025-09-26

## 2024-09-28 DIAGNOSIS — E11.65 TYPE 2 DIABETES MELLITUS WITH HYPERGLYCEMIA, WITH LONG-TERM CURRENT USE OF INSULIN: Chronic | ICD-10-CM

## 2024-09-28 DIAGNOSIS — Z79.4 TYPE 2 DIABETES MELLITUS WITH HYPERGLYCEMIA, WITH LONG-TERM CURRENT USE OF INSULIN: Chronic | ICD-10-CM

## 2024-09-30 RX ORDER — TIRZEPATIDE 2.5 MG/.5ML
2.5 INJECTION, SOLUTION SUBCUTANEOUS
Qty: 2 ML | Refills: 0 | Status: SHIPPED | OUTPATIENT
Start: 2024-09-30 | End: 2024-10-30

## 2024-10-03 ENCOUNTER — TELEPHONE (OUTPATIENT)
Dept: PRIMARY CARE CLINIC | Facility: CLINIC | Age: 59
End: 2024-10-03
Payer: COMMERCIAL

## 2024-10-03 DIAGNOSIS — Z79.4 TYPE 2 DIABETES MELLITUS WITH HYPERGLYCEMIA, WITH LONG-TERM CURRENT USE OF INSULIN: Chronic | ICD-10-CM

## 2024-10-03 DIAGNOSIS — E11.65 TYPE 2 DIABETES MELLITUS WITH HYPERGLYCEMIA, WITH LONG-TERM CURRENT USE OF INSULIN: Chronic | ICD-10-CM

## 2024-10-03 RX ORDER — TIRZEPATIDE 5 MG/.5ML
5 INJECTION, SOLUTION SUBCUTANEOUS
Qty: 2 ML | Refills: 0 | Status: SHIPPED | OUTPATIENT
Start: 2024-10-03 | End: 2024-11-02

## 2024-10-03 NOTE — TELEPHONE ENCOUNTER
----- Message from Analilia Romero MD sent at 10/3/2024  1:43 PM CDT -----  Regarding: Diabetes  Please call and let the patient know that I reviewed her sugar logs. Overall they are looking much better!    Currently patient should be on Mounjaro 5mg (already completed 4 weeks of 2.5mg in September). Goal is to keep increasing the dose of Mounjaro (max is 15 mg) and hopefully decrease the dose of insulin every month.    - Continue Synjardy 2 tablets daily    - Take 1 tablet of Amaryl in the morning. Take 1/2 to 1 tablet in the evening    Of the medication she is on Insulin and Amaryl are most likely to cause low sugars. I would prefer to decrease insulin first.     Goal fasting glucose <100 >70  Goal glucose 2 hours after largest meal <180    Please send me more logs so we can keep working on this!    Thanks,  Analilia Romero MD

## 2024-10-03 NOTE — TELEPHONE ENCOUNTER
Verbal information  given   Check bs at night if 140 or less give 45 unit.  Add snack in the evening high protein and fats.   Patient verbalized understanding.

## 2024-10-04 PROBLEM — K52.9 CHRONIC DIARRHEA: Chronic | Status: ACTIVE | Noted: 2024-09-05

## 2024-10-04 NOTE — ASSESSMENT & PLAN NOTE
Most recent A1c was 9.6, goal A1c <7.0%   Continue insulin   Start Synjardy, Amaryl and Mounjaro  Taper off of insulin as tolerated   Goal fasting glucose <100 >70 and post prandial glucose <180  Continue ACE-I and Statin   Continue ADA diet, Counseled on importance of diet & weight loss

## 2024-10-04 NOTE — ASSESSMENT & PLAN NOTE
Stable   Defer to Nephrology   Follow low sodium diet (2 grams a day), control high blood pressure (goal <130/80), control diabetes (goal A1c <7.0%)   Decrease or stop alcohol joshua   Do not take NSAIDs (Ibuprofen, Naproxen, Aleve, Toradol, Mobic), may only take Tylenol as needed for pain/headaches   Take cholesterol- lowering medications as prescribed (goal LDL <100)

## 2024-10-24 DIAGNOSIS — E11.65 TYPE 2 DIABETES MELLITUS WITH HYPERGLYCEMIA, WITH LONG-TERM CURRENT USE OF INSULIN: Chronic | ICD-10-CM

## 2024-10-24 DIAGNOSIS — Z79.4 TYPE 2 DIABETES MELLITUS WITH HYPERGLYCEMIA, WITH LONG-TERM CURRENT USE OF INSULIN: Chronic | ICD-10-CM

## 2024-10-25 RX ORDER — EMPAGLIFLOZIN, METFORMIN HYDROCHLORIDE 12.5; 1 MG/1; MG/1
2 TABLET, EXTENDED RELEASE ORAL DAILY
Qty: 60 TABLET | Refills: 11 | Status: SHIPPED | OUTPATIENT
Start: 2024-10-25 | End: 2025-10-25

## 2024-10-25 RX ORDER — TIRZEPATIDE 5 MG/.5ML
5 INJECTION, SOLUTION SUBCUTANEOUS
Qty: 2 ML | Refills: 0 | OUTPATIENT
Start: 2024-10-25 | End: 2024-11-24

## 2024-10-25 NOTE — TELEPHONE ENCOUNTER
No answer,left voicmail for patient in regards of her Mounjaro.   Patient was instructed to reach out to the office with any questions.

## 2024-11-18 ENCOUNTER — TELEPHONE (OUTPATIENT)
Dept: PRIMARY CARE CLINIC | Facility: CLINIC | Age: 59
End: 2024-11-18
Payer: COMMERCIAL

## 2024-11-18 RX ORDER — VERAPAMIL HYDROCHLORIDE 40 MG/1
40 TABLET ORAL 3 TIMES DAILY
Qty: 90 TABLET | Refills: 11 | Status: SHIPPED | OUTPATIENT
Start: 2024-11-18

## 2024-11-18 NOTE — TELEPHONE ENCOUNTER
Spoke with pt and confirmed . Pt said it's 40mg, 1 pill three times a day, medication was prescribed by Dr. Doreen Middleton.

## 2024-11-18 NOTE — TELEPHONE ENCOUNTER
Please call the patient and see if she is on Verapamil and ask how she is taking it. Don't see it on the medication list.     Thanks!  Analilia Romero MD

## 2024-11-18 NOTE — LETTER
AUTHORIZATION FOR RELEASE OF   CONFIDENTIAL INFORMATION    Dear medical records,    We are seeing Cleopatra Campos, date of birth 1965, in [place] [facility]. Analilia Romero MD is the patient's PCP. Cleopatra Campos has an outstanding lab/procedure at the time we reviewed her chart. In order to help keep her health information updated, she has authorized us to request the following medical record(s):        (  )  MAMMOGRAM                                      (  )  COLONOSCOPY      (  )  PAP SMEAR                                          (  )  OUTSIDE LAB RESULTS     (  )  DEXA SCAN                                          (  )  EYE EXAM            (  )  FOOT EXAM                                          (  )  ENTIRE RECORD     (  )  OUTSIDE IMMUNIZATIONS                 (x  )  medication list          Please fax records to [facility], [place], Madelaine F, MD, [phone]              Patient Name: Cleopatra Campos  : 1965  Patient Phone #: [phone]

## 2024-11-21 DIAGNOSIS — E11.42 DIABETIC POLYNEUROPATHY ASSOCIATED WITH TYPE 2 DIABETES MELLITUS: ICD-10-CM

## 2024-11-22 RX ORDER — GABAPENTIN 100 MG/1
CAPSULE ORAL
Qty: 90 CAPSULE | Refills: 0 | Status: SHIPPED | OUTPATIENT
Start: 2024-11-22

## 2024-11-23 DIAGNOSIS — K52.9 CHRONIC DIARRHEA: Chronic | ICD-10-CM

## 2024-11-23 DIAGNOSIS — E11.65 TYPE 2 DIABETES MELLITUS WITH HYPERGLYCEMIA, WITH LONG-TERM CURRENT USE OF INSULIN: Chronic | ICD-10-CM

## 2024-11-23 DIAGNOSIS — Z79.4 TYPE 2 DIABETES MELLITUS WITH HYPERGLYCEMIA, WITH LONG-TERM CURRENT USE OF INSULIN: Chronic | ICD-10-CM

## 2024-11-25 RX ORDER — MONTELUKAST SODIUM 4 MG/1
1 TABLET, CHEWABLE ORAL 2 TIMES DAILY
Qty: 60 TABLET | Refills: 11 | Status: SHIPPED | OUTPATIENT
Start: 2024-11-25 | End: 2025-11-25

## 2024-11-25 RX ORDER — EMPAGLIFLOZIN, METFORMIN HYDROCHLORIDE 12.5; 1 MG/1; MG/1
2 TABLET, EXTENDED RELEASE ORAL DAILY
Qty: 60 TABLET | Refills: 11 | Status: SHIPPED | OUTPATIENT
Start: 2024-11-25 | End: 2025-11-25

## 2024-12-05 ENCOUNTER — LAB VISIT (OUTPATIENT)
Dept: LAB | Facility: HOSPITAL | Age: 59
End: 2024-12-05
Attending: STUDENT IN AN ORGANIZED HEALTH CARE EDUCATION/TRAINING PROGRAM
Payer: COMMERCIAL

## 2024-12-05 DIAGNOSIS — E11.65 TYPE 2 DIABETES MELLITUS WITH HYPERGLYCEMIA, WITH LONG-TERM CURRENT USE OF INSULIN: Chronic | ICD-10-CM

## 2024-12-05 DIAGNOSIS — N18.31 CHRONIC KIDNEY DISEASE, STAGE 3A: Chronic | ICD-10-CM

## 2024-12-05 DIAGNOSIS — Z79.4 TYPE 2 DIABETES MELLITUS WITH HYPERGLYCEMIA, WITH LONG-TERM CURRENT USE OF INSULIN: Chronic | ICD-10-CM

## 2024-12-05 LAB
ALBUMIN SERPL-MCNC: 3.7 G/DL (ref 3.4–4.8)
ALBUMIN/GLOB SERPL: 1.2 RATIO (ref 1.1–2)
ALP SERPL-CCNC: 86 UNIT/L (ref 40–150)
ALT SERPL-CCNC: 19 UNIT/L (ref 0–55)
ANION GAP SERPL CALC-SCNC: 11 MEQ/L
AST SERPL-CCNC: 15 UNIT/L (ref 5–34)
BILIRUB SERPL-MCNC: 0.4 MG/DL
BUN SERPL-MCNC: 35 MG/DL (ref 9.8–20.1)
CALCIUM SERPL-MCNC: 9.7 MG/DL (ref 8.4–10.2)
CHLORIDE SERPL-SCNC: 109 MMOL/L (ref 98–107)
CO2 SERPL-SCNC: 21 MMOL/L (ref 23–31)
CREAT SERPL-MCNC: 1.11 MG/DL (ref 0.55–1.02)
CREAT/UREA NIT SERPL: 32
EST. AVERAGE GLUCOSE BLD GHB EST-MCNC: 128.4 MG/DL
GFR SERPLBLD CREATININE-BSD FMLA CKD-EPI: 57 ML/MIN/1.73/M2
GLOBULIN SER-MCNC: 3.1 GM/DL (ref 2.4–3.5)
GLUCOSE SERPL-MCNC: 125 MG/DL (ref 82–115)
HBA1C MFR BLD: 6.1 %
POTASSIUM SERPL-SCNC: 5.1 MMOL/L (ref 3.5–5.1)
PROT SERPL-MCNC: 6.8 GM/DL (ref 5.8–7.6)
SODIUM SERPL-SCNC: 141 MMOL/L (ref 136–145)

## 2024-12-05 PROCEDURE — 86341 ISLET CELL ANTIBODY: CPT

## 2024-12-05 PROCEDURE — 83036 HEMOGLOBIN GLYCOSYLATED A1C: CPT

## 2024-12-05 PROCEDURE — 84681 ASSAY OF C-PEPTIDE: CPT

## 2024-12-05 PROCEDURE — 80053 COMPREHEN METABOLIC PANEL: CPT

## 2024-12-05 PROCEDURE — 36415 COLL VENOUS BLD VENIPUNCTURE: CPT

## 2024-12-06 LAB — C PEPTIDE P FAST SERPL-MCNC: 8 NG/ML (ref 1.1–4.4)

## 2024-12-10 ENCOUNTER — OFFICE VISIT (OUTPATIENT)
Dept: PRIMARY CARE CLINIC | Facility: CLINIC | Age: 59
End: 2024-12-10
Payer: COMMERCIAL

## 2024-12-10 VITALS
SYSTOLIC BLOOD PRESSURE: 128 MMHG | BODY MASS INDEX: 40.44 KG/M2 | WEIGHT: 206 LBS | OXYGEN SATURATION: 97 % | HEIGHT: 60 IN | DIASTOLIC BLOOD PRESSURE: 77 MMHG | TEMPERATURE: 98 F | HEART RATE: 83 BPM

## 2024-12-10 DIAGNOSIS — G47.00 INSOMNIA, UNSPECIFIED TYPE: Chronic | ICD-10-CM

## 2024-12-10 DIAGNOSIS — R06.83 SNORING: ICD-10-CM

## 2024-12-10 DIAGNOSIS — I10 PRIMARY HYPERTENSION: Chronic | ICD-10-CM

## 2024-12-10 DIAGNOSIS — N18.31 CHRONIC KIDNEY DISEASE, STAGE 3A: Chronic | ICD-10-CM

## 2024-12-10 DIAGNOSIS — Z00.00 WELLNESS EXAMINATION: Chronic | ICD-10-CM

## 2024-12-10 DIAGNOSIS — E66.01 MORBID OBESITY: Chronic | ICD-10-CM

## 2024-12-10 DIAGNOSIS — E11.65 TYPE 2 DIABETES MELLITUS WITH HYPERGLYCEMIA, WITH LONG-TERM CURRENT USE OF INSULIN: Primary | Chronic | ICD-10-CM

## 2024-12-10 DIAGNOSIS — E78.2 MIXED HYPERLIPIDEMIA: Chronic | ICD-10-CM

## 2024-12-10 DIAGNOSIS — Z79.4 TYPE 2 DIABETES MELLITUS WITH HYPERGLYCEMIA, WITH LONG-TERM CURRENT USE OF INSULIN: Primary | Chronic | ICD-10-CM

## 2024-12-10 LAB — GAD65 AB SER-SCNC: 0.02 NMOL/L

## 2024-12-10 PROCEDURE — G2211 COMPLEX E/M VISIT ADD ON: HCPCS | Mod: ,,, | Performed by: STUDENT IN AN ORGANIZED HEALTH CARE EDUCATION/TRAINING PROGRAM

## 2024-12-10 PROCEDURE — 99214 OFFICE O/P EST MOD 30 MIN: CPT | Mod: ,,, | Performed by: STUDENT IN AN ORGANIZED HEALTH CARE EDUCATION/TRAINING PROGRAM

## 2024-12-10 RX ORDER — TIRZEPATIDE 12.5 MG/.5ML
12.5 INJECTION, SOLUTION SUBCUTANEOUS
Qty: 2 ML | Refills: 0 | Status: SHIPPED | OUTPATIENT
Start: 2025-01-09 | End: 2025-02-08

## 2024-12-10 RX ORDER — TRAZODONE HYDROCHLORIDE 50 MG/1
50 TABLET ORAL NIGHTLY
Qty: 30 TABLET | Refills: 11 | Status: SHIPPED | OUTPATIENT
Start: 2024-12-10 | End: 2025-12-10

## 2024-12-10 RX ORDER — PEN NEEDLE, DIABETIC 32GX 5/32"
1 NEEDLE, DISPOSABLE MISCELLANEOUS NIGHTLY
Qty: 100 EACH | Refills: 2 | Status: SHIPPED | OUTPATIENT
Start: 2024-12-10

## 2024-12-11 NOTE — ASSESSMENT & PLAN NOTE
Most recent A1c was 6.1, goal A1c <7.0%   Continue insulin - taper off insulin as tolerated in 5U increments   Continue Synjardy, Amaryl and Mounjaro (increase to 10mg, 12.5mg and 15mg)  Monitor for hypoglycemia  Goal fasting glucose <100 >70 and post prandial glucose <180  Continue ACE-I and Statin   Continue ADA diet, Counseled on importance of diet & weight loss

## 2024-12-11 NOTE — ASSESSMENT & PLAN NOTE
Encouraged healthy lifestyle/diet modifications   Exercise 3-5x a week (>30 minutes, including a variety of cardio, weightbearing and lifting exercises)   Goal (1-2lbs loss per week), avg. 500-750 calories deficit daily  Eat a well balanced diet comprised of fruit/vegetables, lean protein, and complex carbs

## 2024-12-11 NOTE — PROGRESS NOTES
Subjective:       Patient ID: Cleopatra Campos is a 59 y.o. female.    -------------------------------------    Clotting disorder    Coronary artery disease    Diabetes mellitus, type 2    DVT (deep venous thrombosis)    Heart attack    Hyperlipidemia    Hypertension    Hypothyroidism    Thyroid disease      Chief Complaint: Follow-up (2 mth, labs)    Presents to the clinic for diabetes follow up. Reviewed labs, answered all questions and concerns at this time. A1c improved to 6.1 from 9.6, tolerating Mounjaro well, wished she would lose more weight however. Noted to have a few low sugar episodes in the early morning hours. Endorsed worsening insomnia. Also noted that she was supposed to be checked for sleep apnea in the past but never did. Snoring and frequent awakenings and daytime fatigue exhibited.       Review of Systems   Constitutional:  Negative for chills and fever.   Respiratory:  Negative for cough and shortness of breath.    Cardiovascular:  Negative for chest pain and leg swelling.   Gastrointestinal:  Negative for abdominal pain, diarrhea, nausea and vomiting.   Genitourinary:  Negative for dysuria and hematuria.   Psychiatric/Behavioral:  Positive for sleep disturbance. Negative for dysphoric mood.          Objective:      Physical Exam  Vitals and nursing note reviewed.   Constitutional:       General: She is not in acute distress.     Appearance: Normal appearance. She is not ill-appearing.   HENT:      Head: Normocephalic.      Nose: Nose normal. No rhinorrhea.      Mouth/Throat:      Mouth: Mucous membranes are moist.   Eyes:      General: No scleral icterus.     Conjunctiva/sclera: Conjunctivae normal.   Cardiovascular:      Rate and Rhythm: Normal rate and regular rhythm.   Pulmonary:      Effort: Pulmonary effort is normal. No respiratory distress.   Musculoskeletal:         General: No deformity.   Skin:     General: Skin is warm and dry.      Coloration: Skin is not jaundiced.   Neurological:  "     Mental Status: She is alert and oriented to person, place, and time. Mental status is at baseline.      Gait: Gait normal.   Psychiatric:         Mood and Affect: Mood normal.         Behavior: Behavior normal.         Assessment & Plan:   1. Type 2 diabetes mellitus with hyperglycemia, with long-term current use of insulin  Assessment & Plan:  Most recent A1c was 6.1, goal A1c <7.0%   Continue insulin - taper off insulin as tolerated in 5U increments   Continue Synjardy, Amaryl and Mounjaro (increase to 10mg, 12.5mg and 15mg)  Monitor for hypoglycemia  Goal fasting glucose <100 >70 and post prandial glucose <180  Continue ACE-I and Statin   Continue ADA diet, Counseled on importance of diet & weight loss       Orders:  -     tirzepatide 10 mg/0.5 mL PnIj; Inject 10 mg into the skin every 7 days.  Dispense: 2 mL; Refill: 0  -     tirzepatide (MOUNJARO) 12.5 mg/0.5 mL PnIj; Inject 12.5 mg into the skin every 7 days.  Dispense: 2 mL; Refill: 0  -     tirzepatide 15 mg/0.5 mL PnIj; Inject 15 mg into the skin every 7 days.  Dispense: 2 mL; Refill: 11  -     BD GINA 2ND GEN PEN NEEDLE 32 gauge x 5/32" Ndle; Inject 1 each as directed every evening.  Dispense: 100 each; Refill: 2  -     CBC Auto Differential; Future; Expected date: 03/10/2025  -     Comprehensive Metabolic Panel; Future; Expected date: 03/10/2025  -     Urinalysis; Future; Expected date: 03/10/2025  -     TSH; Future; Expected date: 03/10/2025  -     Lipid Panel; Future; Expected date: 03/10/2025  -     Hemoglobin A1C; Future; Expected date: 03/10/2025  -     Microalbumin/Creatinine Ratio, Urine; Future; Expected date: 03/10/2025    2. Morbid obesity  Assessment & Plan:  Encouraged healthy lifestyle/diet modifications   Exercise 3-5x a week (>30 minutes, including a variety of cardio, weightbearing and lifting exercises)   Goal (1-2lbs loss per week), avg. 500-750 calories deficit daily  Eat a well balanced diet comprised of fruit/vegetables, lean " protein, and complex carbs      Orders:  -     CBC Auto Differential; Future; Expected date: 03/10/2025  -     Comprehensive Metabolic Panel; Future; Expected date: 03/10/2025  -     Urinalysis; Future; Expected date: 03/10/2025  -     TSH; Future; Expected date: 03/10/2025  -     Lipid Panel; Future; Expected date: 03/10/2025  -     Hemoglobin A1C; Future; Expected date: 03/10/2025  -     Microalbumin/Creatinine Ratio, Urine; Future; Expected date: 03/10/2025    3. Insomnia, unspecified type  Assessment & Plan:  Worsening   Start Trazodone nightly as needed for insomnia  Consider Lunesta   Encouraged good sleep hygiene (limit caffeine to earlier in shift, avoid naps, blackout curtains, blue lights, limit phone/tv exposure etc)       Orders:  -     traZODone (DESYREL) 50 MG tablet; Take 1 tablet (50 mg total) by mouth every evening.  Dispense: 30 tablet; Refill: 11  -     CBC Auto Differential; Future; Expected date: 03/10/2025  -     Comprehensive Metabolic Panel; Future; Expected date: 03/10/2025  -     Urinalysis; Future; Expected date: 03/10/2025  -     TSH; Future; Expected date: 03/10/2025  -     Lipid Panel; Future; Expected date: 03/10/2025  -     Hemoglobin A1C; Future; Expected date: 03/10/2025  -     Microalbumin/Creatinine Ratio, Urine; Future; Expected date: 03/10/2025    4. Snoring  Comments:  Frequent awakenings  Referral to sleep disorders concern for MARK  Orders:  -     Ambulatory referral/consult to Sleep Disorders; Future; Expected date: 12/17/2024    5. Mixed hyperlipidemia  Assessment & Plan:  Last Lipid Panel elevated triglycerides   ASCVD calculated 10 year risk.   Continue Lipitor, colestipol, vascepa  Counseled on dietary modifications  Counseled to exercise 150 minutes weekly as exercise raises HDL and lowers LDL       Orders:  -     CBC Auto Differential; Future; Expected date: 03/10/2025  -     Comprehensive Metabolic Panel; Future; Expected date: 03/10/2025  -     Urinalysis; Future;  Expected date: 03/10/2025  -     TSH; Future; Expected date: 03/10/2025  -     Lipid Panel; Future; Expected date: 03/10/2025  -     Hemoglobin A1C; Future; Expected date: 03/10/2025  -     Microalbumin/Creatinine Ratio, Urine; Future; Expected date: 03/10/2025    6. Primary hypertension  Assessment & Plan:  Today's BP WNL  Continue Losartan and Metoprolol  Counseled on low sodium diet, exercise, tobacco avoidance, and limiting alcohol intake   Pt. Has home BP cuff, instructed to measure home BP and report abnormal values       Orders:  -     CBC Auto Differential; Future; Expected date: 03/10/2025  -     Comprehensive Metabolic Panel; Future; Expected date: 03/10/2025  -     Urinalysis; Future; Expected date: 03/10/2025  -     TSH; Future; Expected date: 03/10/2025  -     Lipid Panel; Future; Expected date: 03/10/2025  -     Hemoglobin A1C; Future; Expected date: 03/10/2025  -     Microalbumin/Creatinine Ratio, Urine; Future; Expected date: 03/10/2025    7. Chronic kidney disease, stage 3a  Assessment & Plan:  Stable   Defer to Nephrology   Follow low sodium diet (2 grams a day), control high blood pressure (goal <130/80), control diabetes (goal A1c <7.0%)   Decrease or stop alcohol joshua   Do not take NSAIDs (Ibuprofen, Naproxen, Aleve, Toradol, Mobic), may only take Tylenol as needed for pain/headaches   Take cholesterol- lowering medications as prescribed (goal LDL <100)       Orders:  -     CBC Auto Differential; Future; Expected date: 03/10/2025  -     Comprehensive Metabolic Panel; Future; Expected date: 03/10/2025  -     Urinalysis; Future; Expected date: 03/10/2025  -     TSH; Future; Expected date: 03/10/2025  -     Lipid Panel; Future; Expected date: 03/10/2025  -     Hemoglobin A1C; Future; Expected date: 03/10/2025  -     Microalbumin/Creatinine Ratio, Urine; Future; Expected date: 03/10/2025    8. Wellness examination  -     CBC Auto Differential; Future; Expected date: 03/10/2025  -     Comprehensive  Metabolic Panel; Future; Expected date: 03/10/2025  -     Urinalysis; Future; Expected date: 03/10/2025  -     TSH; Future; Expected date: 03/10/2025  -     Lipid Panel; Future; Expected date: 03/10/2025  -     Hemoglobin A1C; Future; Expected date: 03/10/2025  -     Microalbumin/Creatinine Ratio, Urine; Future; Expected date: 03/10/2025    Follow up in about 3 months (around 3/10/2025) for Wellness. In addition to their scheduled follow up, the patient has also been instructed to follow up on as needed basis.

## 2024-12-11 NOTE — ASSESSMENT & PLAN NOTE
Today's BP WNL  Continue Losartan and Metoprolol  Counseled on low sodium diet, exercise, tobacco avoidance, and limiting alcohol intake   Pt. Has home BP cuff, instructed to measure home BP and report abnormal values

## 2024-12-11 NOTE — ASSESSMENT & PLAN NOTE
Last Lipid Panel elevated triglycerides   ASCVD calculated 10 year risk.   Continue Lipitor, colestipol, vascepa  Counseled on dietary modifications  Counseled to exercise 150 minutes weekly as exercise raises HDL and lowers LDL

## 2024-12-11 NOTE — ASSESSMENT & PLAN NOTE
Worsening   Start Trazodone nightly as needed for insomnia  Consider Lunesta   Encouraged good sleep hygiene (limit caffeine to earlier in shift, avoid naps, blackout curtains, blue lights, limit phone/tv exposure etc)

## 2024-12-19 ENCOUNTER — TELEPHONE (OUTPATIENT)
Dept: FAMILY MEDICINE | Facility: CLINIC | Age: 59
End: 2024-12-19
Payer: COMMERCIAL

## 2024-12-19 NOTE — TELEPHONE ENCOUNTER
----- Message from Analilia Romero MD sent at 12/18/2024  7:58 PM CST -----  Regarding: RE: Referral Denial  Please reach out to the patient and let her know the number of the [facility] is trying to get in touch with her.     Thanks  Analilia Romero MD  ----- Message -----  From: Willam SuttonYAMILEX  Sent: 12/18/2024   3:52 PM CST  To: Analilia Romero MD  Subject: FW: Referral Denial                                ----- Message -----  From: Samia Grady  Sent: 12/18/2024   1:18 PM CST  To: [facility] Staff  Subject: Referral Denial                                  We would like to thank you for the referral to [facility]. However, currently we are unable to accept your patient into our clinic due to multiple attempts to contact patient without answer or return call.    Sincerely,  [facility] Referral Department

## 2025-01-03 DIAGNOSIS — G47.00 INSOMNIA, UNSPECIFIED TYPE: Chronic | ICD-10-CM

## 2025-01-03 DIAGNOSIS — E11.65 TYPE 2 DIABETES MELLITUS WITH HYPERGLYCEMIA, WITH LONG-TERM CURRENT USE OF INSULIN: Chronic | ICD-10-CM

## 2025-01-03 DIAGNOSIS — Z79.4 TYPE 2 DIABETES MELLITUS WITH HYPERGLYCEMIA, WITH LONG-TERM CURRENT USE OF INSULIN: Chronic | ICD-10-CM

## 2025-01-03 DIAGNOSIS — E03.4 ACQUIRED ATROPHY OF THYROID: Chronic | ICD-10-CM

## 2025-01-03 RX ORDER — LEVOTHYROXINE SODIUM 100 UG/1
100 TABLET ORAL EVERY MORNING
Qty: 90 TABLET | Refills: 3 | Status: SHIPPED | OUTPATIENT
Start: 2025-01-03

## 2025-01-03 RX ORDER — PEN NEEDLE, DIABETIC 32GX 5/32"
1 NEEDLE, DISPOSABLE MISCELLANEOUS NIGHTLY
Qty: 100 EACH | Refills: 2 | Status: SHIPPED | OUTPATIENT
Start: 2025-01-03

## 2025-01-03 RX ORDER — TRAZODONE HYDROCHLORIDE 50 MG/1
50 TABLET ORAL NIGHTLY
Qty: 30 TABLET | Refills: 11 | Status: SHIPPED | OUTPATIENT
Start: 2025-01-03 | End: 2026-01-03

## 2025-01-12 DIAGNOSIS — E11.65 TYPE 2 DIABETES MELLITUS WITH HYPERGLYCEMIA, WITH LONG-TERM CURRENT USE OF INSULIN: Chronic | ICD-10-CM

## 2025-01-12 DIAGNOSIS — Z79.4 TYPE 2 DIABETES MELLITUS WITH HYPERGLYCEMIA, WITH LONG-TERM CURRENT USE OF INSULIN: Chronic | ICD-10-CM

## 2025-01-13 RX ORDER — GLIMEPIRIDE 4 MG/1
4 TABLET ORAL 2 TIMES DAILY
Qty: 180 TABLET | Refills: 3 | Status: SHIPPED | OUTPATIENT
Start: 2025-01-13 | End: 2026-01-13

## 2025-01-21 DIAGNOSIS — I48.91 ATRIAL FIBRILLATION, UNSPECIFIED TYPE: Chronic | ICD-10-CM

## 2025-01-24 ENCOUNTER — TELEPHONE (OUTPATIENT)
Dept: PRIMARY CARE CLINIC | Facility: CLINIC | Age: 60
End: 2025-01-24
Payer: COMMERCIAL

## 2025-01-24 RX ORDER — APIXABAN 5 MG/1
5 TABLET, FILM COATED ORAL 2 TIMES DAILY
Qty: 180 TABLET | Refills: 3 | Status: SHIPPED | OUTPATIENT
Start: 2025-01-24

## 2025-01-24 NOTE — TELEPHONE ENCOUNTER
Called Pt, No Answer; LVM.  *note for self*MOUNJARO 12.5 will last till 02/08/2025, PCP is changing to a larger dosage which will be available 02/07/2025.

## 2025-01-24 NOTE — TELEPHONE ENCOUNTER
----- Message from Sarah sent at 1/24/2025 11:05 AM CST -----  Regarding: returning call  .Who Called: Cleopatra Campos    Patient is returning phone call    Who Left Message for Patient:Rylee  Does the patient know what this is regarding?:med      Preferred Method of Contact: Phone Call  Patient's Preferred Phone Number on File: [phone]   Best Call Back Number, if different:  Additional Information: pt returning call ABL no answer

## 2025-01-30 DIAGNOSIS — E11.65 TYPE 2 DIABETES MELLITUS WITH HYPERGLYCEMIA, WITH LONG-TERM CURRENT USE OF INSULIN: Primary | ICD-10-CM

## 2025-01-30 DIAGNOSIS — Z79.4 TYPE 2 DIABETES MELLITUS WITH HYPERGLYCEMIA, WITH LONG-TERM CURRENT USE OF INSULIN: Primary | ICD-10-CM

## 2025-02-14 ENCOUNTER — PATIENT MESSAGE (OUTPATIENT)
Dept: PRIMARY CARE CLINIC | Facility: CLINIC | Age: 60
End: 2025-02-14
Payer: COMMERCIAL

## 2025-02-14 DIAGNOSIS — R11.0 NAUSEA: Primary | ICD-10-CM

## 2025-02-14 RX ORDER — ONDANSETRON 4 MG/1
4 TABLET, ORALLY DISINTEGRATING ORAL EVERY 8 HOURS PRN
Qty: 30 TABLET | Refills: 0 | Status: SHIPPED | OUTPATIENT
Start: 2025-02-14

## 2025-02-14 RX ORDER — ONDANSETRON 4 MG/1
4 TABLET, ORALLY DISINTEGRATING ORAL EVERY 8 HOURS PRN
Qty: 30 TABLET | Refills: 0 | Status: SHIPPED | OUTPATIENT
Start: 2025-02-14 | End: 2025-02-14 | Stop reason: SDUPTHER

## 2025-02-19 DIAGNOSIS — E11.42 DIABETIC POLYNEUROPATHY ASSOCIATED WITH TYPE 2 DIABETES MELLITUS: ICD-10-CM

## 2025-02-19 RX ORDER — GABAPENTIN 100 MG/1
CAPSULE ORAL
Qty: 90 CAPSULE | Refills: 0 | Status: SHIPPED | OUTPATIENT
Start: 2025-02-19

## 2025-03-04 DIAGNOSIS — Z79.4 TYPE 2 DIABETES MELLITUS WITH HYPERGLYCEMIA, WITH LONG-TERM CURRENT USE OF INSULIN: Chronic | ICD-10-CM

## 2025-03-04 DIAGNOSIS — E11.65 TYPE 2 DIABETES MELLITUS WITH HYPERGLYCEMIA, WITH LONG-TERM CURRENT USE OF INSULIN: Chronic | ICD-10-CM

## 2025-03-07 ENCOUNTER — LAB VISIT (OUTPATIENT)
Dept: LAB | Facility: HOSPITAL | Age: 60
End: 2025-03-07
Attending: STUDENT IN AN ORGANIZED HEALTH CARE EDUCATION/TRAINING PROGRAM
Payer: COMMERCIAL

## 2025-03-07 DIAGNOSIS — I10 PRIMARY HYPERTENSION: ICD-10-CM

## 2025-03-07 DIAGNOSIS — G47.00 INSOMNIA, UNSPECIFIED TYPE: ICD-10-CM

## 2025-03-07 DIAGNOSIS — E66.01 MORBID OBESITY: ICD-10-CM

## 2025-03-07 DIAGNOSIS — Z79.4 TYPE 2 DIABETES MELLITUS WITH HYPERGLYCEMIA, WITH LONG-TERM CURRENT USE OF INSULIN: ICD-10-CM

## 2025-03-07 DIAGNOSIS — N18.31 CHRONIC KIDNEY DISEASE, STAGE 3A: ICD-10-CM

## 2025-03-07 DIAGNOSIS — E78.2 MIXED HYPERLIPIDEMIA: ICD-10-CM

## 2025-03-07 DIAGNOSIS — Z00.00 WELLNESS EXAMINATION: ICD-10-CM

## 2025-03-07 DIAGNOSIS — E11.65 TYPE 2 DIABETES MELLITUS WITH HYPERGLYCEMIA, WITH LONG-TERM CURRENT USE OF INSULIN: ICD-10-CM

## 2025-03-07 LAB
ALBUMIN SERPL-MCNC: 3.5 G/DL (ref 3.4–4.8)
ALBUMIN/GLOB SERPL: 1.2 RATIO (ref 1.1–2)
ALP SERPL-CCNC: 75 UNIT/L (ref 40–150)
ALT SERPL-CCNC: 18 UNIT/L (ref 0–55)
ANION GAP SERPL CALC-SCNC: 9 MEQ/L
AST SERPL-CCNC: 16 UNIT/L (ref 5–34)
BACTERIA #/AREA URNS AUTO: ABNORMAL /HPF
BASOPHILS # BLD AUTO: 0.02 X10(3)/MCL
BASOPHILS NFR BLD AUTO: 0.2 %
BILIRUB SERPL-MCNC: 0.4 MG/DL
BILIRUB UR QL STRIP.AUTO: NEGATIVE
BUN SERPL-MCNC: 27.7 MG/DL (ref 9.8–20.1)
CALCIUM SERPL-MCNC: 8.6 MG/DL (ref 8.4–10.2)
CHLORIDE SERPL-SCNC: 108 MMOL/L (ref 98–107)
CHOLEST SERPL-MCNC: 226 MG/DL
CHOLEST/HDLC SERPL: 6 {RATIO} (ref 0–5)
CLARITY UR: ABNORMAL
CO2 SERPL-SCNC: 23 MMOL/L (ref 23–31)
COLOR UR AUTO: ABNORMAL
CREAT SERPL-MCNC: 1.24 MG/DL (ref 0.55–1.02)
CREAT UR-MCNC: 80.2 MG/DL (ref 45–106)
CREAT/UREA NIT SERPL: 22
EOSINOPHIL # BLD AUTO: 0.31 X10(3)/MCL (ref 0–0.9)
EOSINOPHIL NFR BLD AUTO: 3.7 %
ERYTHROCYTE [DISTWIDTH] IN BLOOD BY AUTOMATED COUNT: 14.7 % (ref 11.5–17)
EST. AVERAGE GLUCOSE BLD GHB EST-MCNC: 125.5 MG/DL
GFR SERPLBLD CREATININE-BSD FMLA CKD-EPI: 50 ML/MIN/1.73/M2
GLOBULIN SER-MCNC: 2.9 GM/DL (ref 2.4–3.5)
GLUCOSE SERPL-MCNC: 128 MG/DL (ref 82–115)
GLUCOSE UR QL STRIP: ABNORMAL
HBA1C MFR BLD: 6 %
HCT VFR BLD AUTO: 34.8 % (ref 37–47)
HDLC SERPL-MCNC: 35 MG/DL (ref 35–60)
HGB BLD-MCNC: 11.5 G/DL (ref 12–16)
HGB UR QL STRIP: ABNORMAL
IMM GRANULOCYTES # BLD AUTO: 0.02 X10(3)/MCL (ref 0–0.04)
IMM GRANULOCYTES NFR BLD AUTO: 0.2 %
KETONES UR QL STRIP: NEGATIVE
LDLC SERPL CALC-MCNC: 119 MG/DL (ref 50–140)
LEUKOCYTE ESTERASE UR QL STRIP: 500
LYMPHOCYTES # BLD AUTO: 4.23 X10(3)/MCL (ref 0.6–4.6)
LYMPHOCYTES NFR BLD AUTO: 49.9 %
MCH RBC QN AUTO: 30.3 PG (ref 27–31)
MCHC RBC AUTO-ENTMCNC: 33 G/DL (ref 33–36)
MCV RBC AUTO: 91.6 FL (ref 80–94)
MICROALBUMIN UR-MCNC: 18.8 UG/ML
MICROALBUMIN/CREAT RATIO PNL UR: 23.4 MG/GM CR (ref 0–30)
MONOCYTES # BLD AUTO: 0.52 X10(3)/MCL (ref 0.1–1.3)
MONOCYTES NFR BLD AUTO: 6.1 %
NEUTROPHILS # BLD AUTO: 3.38 X10(3)/MCL (ref 2.1–9.2)
NEUTROPHILS NFR BLD AUTO: 39.9 %
NITRITE UR QL STRIP: NEGATIVE
NRBC BLD AUTO-RTO: 0.2 %
PH UR STRIP: 5 [PH]
PLATELET # BLD AUTO: 340 X10(3)/MCL (ref 130–400)
PMV BLD AUTO: 9.5 FL (ref 7.4–10.4)
POTASSIUM SERPL-SCNC: 5.2 MMOL/L (ref 3.5–5.1)
PROT SERPL-MCNC: 6.4 GM/DL (ref 5.8–7.6)
PROT UR QL STRIP: NEGATIVE
RBC # BLD AUTO: 3.8 X10(6)/MCL (ref 4.2–5.4)
RBC #/AREA URNS AUTO: ABNORMAL /HPF
SODIUM SERPL-SCNC: 140 MMOL/L (ref 136–145)
SP GR UR STRIP.AUTO: 1.01 (ref 1–1.03)
SQUAMOUS #/AREA URNS LPF: ABNORMAL /HPF
TRIGL SERPL-MCNC: 358 MG/DL (ref 37–140)
TSH SERPL-ACNC: 2.49 UIU/ML (ref 0.35–4.94)
UROBILINOGEN UR STRIP-ACNC: NORMAL
VLDLC SERPL CALC-MCNC: 72 MG/DL
WBC # BLD AUTO: 8.48 X10(3)/MCL (ref 4.5–11.5)
WBC #/AREA URNS AUTO: >100 /HPF

## 2025-03-07 PROCEDURE — 36415 COLL VENOUS BLD VENIPUNCTURE: CPT

## 2025-03-07 PROCEDURE — 80053 COMPREHEN METABOLIC PANEL: CPT

## 2025-03-07 PROCEDURE — 83036 HEMOGLOBIN GLYCOSYLATED A1C: CPT

## 2025-03-07 PROCEDURE — 80061 LIPID PANEL: CPT

## 2025-03-07 PROCEDURE — 81001 URINALYSIS AUTO W/SCOPE: CPT

## 2025-03-07 PROCEDURE — 85025 COMPLETE CBC W/AUTO DIFF WBC: CPT

## 2025-03-07 PROCEDURE — 84443 ASSAY THYROID STIM HORMONE: CPT

## 2025-03-07 PROCEDURE — 82570 ASSAY OF URINE CREATININE: CPT

## 2025-03-20 ENCOUNTER — TELEPHONE (OUTPATIENT)
Dept: PRIMARY CARE CLINIC | Facility: CLINIC | Age: 60
End: 2025-03-20

## 2025-03-20 DIAGNOSIS — K52.9 CHRONIC DIARRHEA: Primary | ICD-10-CM

## 2025-03-20 NOTE — TELEPHONE ENCOUNTER
Pt called to cancel wellness appt due to chronic diarrhea. Pt states she is currently taking Colestid 1 tablet twice per day. Advised pt that per Dr. Romero, pt can take Colestid 2 tablets twice per day. Advised that referral was being placed for Gastroenterology. Advised pt to call the office on Monday to update. Advised to report to ER for further evaluation if symptoms worsen. Pt voiced understanding.

## 2025-04-10 ENCOUNTER — OFFICE VISIT (OUTPATIENT)
Dept: PRIMARY CARE CLINIC | Facility: CLINIC | Age: 60
End: 2025-04-10
Payer: COMMERCIAL

## 2025-04-10 ENCOUNTER — LAB VISIT (OUTPATIENT)
Dept: LAB | Facility: HOSPITAL | Age: 60
End: 2025-04-10
Attending: STUDENT IN AN ORGANIZED HEALTH CARE EDUCATION/TRAINING PROGRAM
Payer: COMMERCIAL

## 2025-04-10 VITALS
WEIGHT: 190.38 LBS | HEIGHT: 60 IN | DIASTOLIC BLOOD PRESSURE: 77 MMHG | HEART RATE: 92 BPM | RESPIRATION RATE: 18 BRPM | BODY MASS INDEX: 37.37 KG/M2 | OXYGEN SATURATION: 97 % | SYSTOLIC BLOOD PRESSURE: 125 MMHG | TEMPERATURE: 98 F

## 2025-04-10 DIAGNOSIS — E78.1 HYPERTRIGLYCERIDEMIA: Primary | ICD-10-CM

## 2025-04-10 DIAGNOSIS — D17.9 LIPOMA, UNSPECIFIED SITE: ICD-10-CM

## 2025-04-10 DIAGNOSIS — Z79.4 TYPE 2 DIABETES MELLITUS WITH HYPERGLYCEMIA, WITH LONG-TERM CURRENT USE OF INSULIN: Chronic | ICD-10-CM

## 2025-04-10 DIAGNOSIS — E11.65 TYPE 2 DIABETES MELLITUS WITH HYPERGLYCEMIA, WITH LONG-TERM CURRENT USE OF INSULIN: Chronic | ICD-10-CM

## 2025-04-10 DIAGNOSIS — E11.42 DIABETIC POLYNEUROPATHY ASSOCIATED WITH TYPE 2 DIABETES MELLITUS: ICD-10-CM

## 2025-04-10 DIAGNOSIS — J30.9 ALLERGIC RHINITIS, UNSPECIFIED SEASONALITY, UNSPECIFIED TRIGGER: ICD-10-CM

## 2025-04-10 DIAGNOSIS — D64.9 ANEMIA, UNSPECIFIED TYPE: ICD-10-CM

## 2025-04-10 DIAGNOSIS — E78.1 HYPERTRIGLYCERIDEMIA: ICD-10-CM

## 2025-04-10 DIAGNOSIS — E87.5 HYPERKALEMIA: ICD-10-CM

## 2025-04-10 LAB
ALBUMIN SERPL-MCNC: 3.9 G/DL (ref 3.4–4.8)
ALBUMIN/GLOB SERPL: 1.1 RATIO (ref 1.1–2)
ALP SERPL-CCNC: 91 UNIT/L (ref 40–150)
ALT SERPL-CCNC: 17 UNIT/L (ref 0–55)
ANION GAP SERPL CALC-SCNC: 9 MEQ/L
AST SERPL-CCNC: 14 UNIT/L (ref 11–45)
BILIRUB SERPL-MCNC: 0.5 MG/DL
BUN SERPL-MCNC: 22.4 MG/DL (ref 9.8–20.1)
CALCIUM SERPL-MCNC: 9.2 MG/DL (ref 8.4–10.2)
CHLORIDE SERPL-SCNC: 106 MMOL/L (ref 98–107)
CO2 SERPL-SCNC: 23 MMOL/L (ref 23–31)
CREAT SERPL-MCNC: 1.09 MG/DL (ref 0.55–1.02)
CREAT/UREA NIT SERPL: 21
FERRITIN SERPL-MCNC: 358.98 NG/ML (ref 4.63–204)
FOLATE SERPL-MCNC: 14.6 NG/ML (ref 7–31.4)
GFR SERPLBLD CREATININE-BSD FMLA CKD-EPI: 58 ML/MIN/1.73/M2
GLOBULIN SER-MCNC: 3.5 GM/DL (ref 2.4–3.5)
GLUCOSE SERPL-MCNC: 113 MG/DL (ref 82–115)
IRON SATN MFR SERPL: 19 % (ref 20–50)
IRON SERPL-MCNC: 52 UG/DL (ref 50–170)
POTASSIUM SERPL-SCNC: 4.8 MMOL/L (ref 3.5–5.1)
PROT SERPL-MCNC: 7.4 GM/DL (ref 5.8–7.6)
SODIUM SERPL-SCNC: 138 MMOL/L (ref 136–145)
TIBC SERPL-MCNC: 217 UG/DL (ref 70–310)
TIBC SERPL-MCNC: 269 UG/DL (ref 250–450)
TRANSFERRIN SERPL-MCNC: 239 MG/DL (ref 173–360)
VIT B12 SERPL-MCNC: 424 PG/ML (ref 213–816)

## 2025-04-10 PROCEDURE — 82728 ASSAY OF FERRITIN: CPT

## 2025-04-10 PROCEDURE — 82746 ASSAY OF FOLIC ACID SERUM: CPT

## 2025-04-10 PROCEDURE — 82607 VITAMIN B-12: CPT

## 2025-04-10 PROCEDURE — 36415 COLL VENOUS BLD VENIPUNCTURE: CPT

## 2025-04-10 PROCEDURE — 80053 COMPREHEN METABOLIC PANEL: CPT

## 2025-04-10 PROCEDURE — 83540 ASSAY OF IRON: CPT

## 2025-04-10 PROCEDURE — 99214 OFFICE O/P EST MOD 30 MIN: CPT | Mod: ,,, | Performed by: STUDENT IN AN ORGANIZED HEALTH CARE EDUCATION/TRAINING PROGRAM

## 2025-04-10 RX ORDER — HYDROXYZINE PAMOATE 25 MG/1
25 CAPSULE ORAL DAILY PRN
COMMUNITY
Start: 2025-02-28

## 2025-04-10 RX ORDER — FENOFIBRATE 160 MG/1
160 TABLET ORAL DAILY
Qty: 90 TABLET | Refills: 3 | Status: SHIPPED | OUTPATIENT
Start: 2025-04-10 | End: 2026-04-10

## 2025-04-10 RX ORDER — GABAPENTIN 300 MG/1
300 CAPSULE ORAL NIGHTLY
Qty: 30 CAPSULE | Refills: 11 | Status: SHIPPED | OUTPATIENT
Start: 2025-04-10 | End: 2026-04-10

## 2025-04-11 DIAGNOSIS — R11.0 NAUSEA: ICD-10-CM

## 2025-04-11 DIAGNOSIS — E03.4 ACQUIRED ATROPHY OF THYROID: Chronic | ICD-10-CM

## 2025-04-11 NOTE — PROGRESS NOTES
Subjective:       Patient ID: Cleopatra Campos is a 59 y.o. female.    -------------------------------------    Clotting disorder    Coronary artery disease    Diabetes mellitus, type 2    DVT (deep venous thrombosis)    Heart attack    Hyperlipidemia    Hypertension    Hypothyroidism    Thyroid disease      Chief Complaint: Results    History of Present Illness    CHIEF COMPLAINT:  Patient presents today for follow up of diabetes management    DIABETES:  She has not taken insulin in almost a month. She reports morning hypoglycemic episodes, with the lowest reading being 43. Evening blood sugar readings have been better controlled with highest value being 130 this month. Fasting glucose was 128 mg/dL and A1C was 6.0%.    HEMATOLOGIC:  She has a longstanding history of anemia. Recent labs showed low RBC count, hemoglobin, and hematocrit. Potassium and chloride levels were elevated, suggesting dehydration. Triglycerides have improved from over 1,400 to 350, but remain elevated.    GENITOURINARY:  Urinalysis showed evidence of UTI, though she denies any current urinary symptoms. She reports only one previous symptomatic UTI in her lifetime.    INTEGUMENTARY:  She reports multiple lipomas on her arm, which were also noted during a recent hospital visit.    ENT:  She reports recurring allergies with associated crusting. Despite cleaning the area and applying Vaseline to soften the crusting, symptoms recur within approximately 30 minutes.Doesn't use nasal spray or OTC allergy medications.        Review of Systems   Constitutional:  Positive for fatigue.   Cardiovascular:  Negative for chest pain.   Endocrine: Positive for polyuria. Negative for polydipsia and polyphagia.   Integumentary:  Negative for pallor.   Neurological:  Positive for headaches. Negative for dizziness, tremors, seizures, speech difficulty and weakness.   Psychiatric/Behavioral:  Negative for confusion. The patient is not nervous/anxious.           Objective:      Physical Exam  Vitals and nursing note reviewed.   Constitutional:       General: She is not in acute distress.     Appearance: Normal appearance. She is not ill-appearing.   HENT:      Head: Normocephalic.      Nose: Nose normal.      Mouth/Throat:      Mouth: Mucous membranes are moist.   Eyes:      General: No scleral icterus.     Conjunctiva/sclera: Conjunctivae normal.   Cardiovascular:      Rate and Rhythm: Normal rate and regular rhythm.   Pulmonary:      Effort: Pulmonary effort is normal. No respiratory distress.   Skin:     General: Skin is warm and dry.      Coloration: Skin is not jaundiced.      Comments: Lipoma on forearms  Lipoma on right shin   Neurological:      Mental Status: She is alert and oriented to person, place, and time. Mental status is at baseline.      Cranial Nerves: No cranial nerve deficit.      Gait: Gait normal.   Psychiatric:         Mood and Affect: Mood normal.         Behavior: Behavior normal.         Assessment & Plan:   Assessment & Plan    IMPRESSION:  Assessed recent labs, noting mild dehydration and anemia.  Evaluated potassium levels, slightly elevated at 5.2; ordered non-fasting labs to check potassium, calcium, iron, folate, and B12 levels.  Reviewed glucose control, noting significant improvement with fasting sugar at 128 and A1C at 6.0%.  Discontinued insulin due to improved glucose control.  Decreased Amaryl from 4 mg twice daily to 2 mg twice daily due to morning hypoglycemic episodes.  Evaluated triglycerides, noting significant improvement from previous levels over 1,400 to current 350.  Considered adding Fenofibrate for further triglyceride management, pending insurance coverage.  Assessed urinalysis results, noting signs of possible UTI but opted not to treat due to lack of symptoms.  Evaluated renal function markers, noting improvement in urine microalbumin and creatinine ratios.  Identified lipomas on arm, determined benign and do not require  intervention unless bothersome.    DIABETES MELLITUS TYPE 2:  - Explained importance of staying hydrated while on Mounjaro.  - Discussed mechanism of action for SGLT2 inhibitors (Jardiance in Synjardy) causing glucose excretion in urine.  - Educated on benefits of newer diabetes medications in protecting kidneys, heart, vessels, and brain.  - Patient to maintain adequate hydration.  - Patient to continue current diet and exercise regimen for weight management.  - Contact the office if still experiencing low glucose in the mornings after decreasing Amaryl, may need to discontinue evening dose.  - Decrease to 2mg BID of Amaryl  - Been off of insulin for >1 month    LIPOMA:  - Explained benign nature of lipomas and when intervention may be necessary.  - Continue to monitor    ALLERGIC RHINITIS:  - Started Flonase nasal spray, 1-2 sprays per nostril twice daily as needed; instructed on proper technique for using nasal spray, aiming towards the eye and taking a deep breath.  - Patient to start taking OTC antihistamine (ie Claritin) for allergy symptoms.    ANEMIA:  - Patient to take a daily multivitamin containing iron, folate, and B12.  - Ordered non-fasting labs to check potassium, calcium, iron, folate, and B12 levels.    HYPERTRIGLYCERIDEMIA:  - Last Lipid Panel with elevated triglycerides   - Continue statin, Vascepa and Colestid  - Contact the office if needing prior authorization for Fenofibrate.    Follow up in about 3 months (around 7/10/2025) for HTN, Diabetes. In addition to their scheduled follow up, the patient has also been instructed to follow up on as needed basis.    This note was generated with the assistance of ambient listening technology. Verbal consent was obtained by the patient and accompanying visitor(s) for the recording of patient appointment to facilitate this note. I attest to having reviewed and edited the generated note for accuracy, though some syntax or spelling errors may persist. Please  contact the author of this note for any clarification.

## 2025-04-14 DIAGNOSIS — Z79.4 TYPE 2 DIABETES MELLITUS WITH HYPERGLYCEMIA, WITH LONG-TERM CURRENT USE OF INSULIN: Chronic | ICD-10-CM

## 2025-04-14 DIAGNOSIS — E11.65 TYPE 2 DIABETES MELLITUS WITH HYPERGLYCEMIA, WITH LONG-TERM CURRENT USE OF INSULIN: Chronic | ICD-10-CM

## 2025-04-14 RX ORDER — ONDANSETRON 4 MG/1
4 TABLET, ORALLY DISINTEGRATING ORAL EVERY 8 HOURS PRN
Qty: 30 TABLET | Refills: 0 | Status: SHIPPED | OUTPATIENT
Start: 2025-04-14

## 2025-04-14 RX ORDER — LEVOTHYROXINE SODIUM 100 UG/1
100 TABLET ORAL EVERY MORNING
Qty: 90 TABLET | Refills: 3 | Status: SHIPPED | OUTPATIENT
Start: 2025-04-14

## 2025-05-02 ENCOUNTER — RESULTS FOLLOW-UP (OUTPATIENT)
Dept: PRIMARY CARE CLINIC | Facility: CLINIC | Age: 60
End: 2025-05-02

## 2025-05-02 NOTE — PROGRESS NOTES
Ms. Campos,     Ferritin level is improving! Will continue to monitor. Iron is a little low, recommend OTC iron supplementation every other day to daily as tolerated. Folate and Vitamin B12 levels were normal range. Liver function was good. Kidney function is stable/improving. Still slightly dehydrated so keep drinking.     Analilia Romero MD

## 2025-05-06 DIAGNOSIS — E11.65 TYPE 2 DIABETES MELLITUS WITH HYPERGLYCEMIA, WITH LONG-TERM CURRENT USE OF INSULIN: Chronic | ICD-10-CM

## 2025-05-06 DIAGNOSIS — E11.42 DIABETIC POLYNEUROPATHY ASSOCIATED WITH TYPE 2 DIABETES MELLITUS: ICD-10-CM

## 2025-05-06 DIAGNOSIS — E03.4 ACQUIRED ATROPHY OF THYROID: Chronic | ICD-10-CM

## 2025-05-06 DIAGNOSIS — Z79.4 TYPE 2 DIABETES MELLITUS WITH HYPERGLYCEMIA, WITH LONG-TERM CURRENT USE OF INSULIN: Chronic | ICD-10-CM

## 2025-05-07 RX ORDER — LEVOTHYROXINE SODIUM 100 UG/1
100 TABLET ORAL EVERY MORNING
Qty: 90 TABLET | Refills: 3 | Status: SHIPPED | OUTPATIENT
Start: 2025-05-07

## 2025-05-07 RX ORDER — GABAPENTIN 300 MG/1
300 CAPSULE ORAL NIGHTLY
Qty: 30 CAPSULE | Refills: 11 | OUTPATIENT
Start: 2025-05-07 | End: 2026-05-07

## 2025-05-07 RX ORDER — LEVOTHYROXINE SODIUM 100 UG/1
100 TABLET ORAL EVERY MORNING
Qty: 90 TABLET | Refills: 3 | Status: SHIPPED | OUTPATIENT
Start: 2025-05-07 | End: 2025-05-07 | Stop reason: SDUPTHER

## 2025-05-07 NOTE — TELEPHONE ENCOUNTER
Called patient and lvm to alert her that refills are attached to tirzeptide and gabapentin and she needs to contact her pharmacy.

## 2025-05-18 DIAGNOSIS — Z79.4 TYPE 2 DIABETES MELLITUS WITH HYPERGLYCEMIA, WITH LONG-TERM CURRENT USE OF INSULIN: Chronic | ICD-10-CM

## 2025-05-18 DIAGNOSIS — K52.9 CHRONIC DIARRHEA: Chronic | ICD-10-CM

## 2025-05-18 DIAGNOSIS — E11.65 TYPE 2 DIABETES MELLITUS WITH HYPERGLYCEMIA, WITH LONG-TERM CURRENT USE OF INSULIN: Chronic | ICD-10-CM

## 2025-05-18 DIAGNOSIS — G47.00 INSOMNIA, UNSPECIFIED TYPE: Chronic | ICD-10-CM

## 2025-05-19 RX ORDER — EMPAGLIFLOZIN, METFORMIN HYDROCHLORIDE 12.5; 1 MG/1; MG/1
2 TABLET, EXTENDED RELEASE ORAL DAILY
Qty: 60 TABLET | Refills: 11 | Status: SHIPPED | OUTPATIENT
Start: 2025-05-19 | End: 2026-05-19

## 2025-05-19 RX ORDER — TRAZODONE HYDROCHLORIDE 50 MG/1
50 TABLET ORAL NIGHTLY
Qty: 30 TABLET | Refills: 11 | Status: SHIPPED | OUTPATIENT
Start: 2025-05-19 | End: 2026-05-19

## 2025-05-19 RX ORDER — COLESTIPOL HYDROCHLORIDE 1 G/1
1 TABLET ORAL 2 TIMES DAILY
Qty: 60 TABLET | Refills: 11 | Status: SHIPPED | OUTPATIENT
Start: 2025-05-19 | End: 2026-05-19

## 2025-06-06 DIAGNOSIS — R11.0 NAUSEA: ICD-10-CM

## 2025-06-09 RX ORDER — ONDANSETRON 4 MG/1
4 TABLET, ORALLY DISINTEGRATING ORAL EVERY 8 HOURS PRN
Qty: 30 TABLET | Refills: 0 | Status: SHIPPED | OUTPATIENT
Start: 2025-06-09

## 2025-07-01 DIAGNOSIS — I10 HYPERTENSION, UNSPECIFIED TYPE: ICD-10-CM

## 2025-07-01 DIAGNOSIS — E11.42 DIABETIC POLYNEUROPATHY ASSOCIATED WITH TYPE 2 DIABETES MELLITUS: ICD-10-CM

## 2025-07-01 DIAGNOSIS — G47.00 INSOMNIA, UNSPECIFIED TYPE: Chronic | ICD-10-CM

## 2025-07-01 RX ORDER — TRAZODONE HYDROCHLORIDE 50 MG/1
50 TABLET ORAL NIGHTLY
Qty: 30 TABLET | Refills: 11 | Status: SHIPPED | OUTPATIENT
Start: 2025-07-01 | End: 2026-07-01

## 2025-07-01 RX ORDER — METOPROLOL TARTRATE 50 MG/1
50 TABLET ORAL 2 TIMES DAILY
Qty: 180 TABLET | Refills: 3 | Status: SHIPPED | OUTPATIENT
Start: 2025-07-01 | End: 2026-07-01

## 2025-07-02 RX ORDER — GABAPENTIN 300 MG/1
300 CAPSULE ORAL NIGHTLY
Qty: 30 CAPSULE | Refills: 11 | Status: SHIPPED | OUTPATIENT
Start: 2025-07-02 | End: 2026-07-02

## 2025-07-23 DIAGNOSIS — Z79.4 TYPE 2 DIABETES MELLITUS WITH HYPERGLYCEMIA, WITH LONG-TERM CURRENT USE OF INSULIN: ICD-10-CM

## 2025-07-23 DIAGNOSIS — E11.65 TYPE 2 DIABETES MELLITUS WITH HYPERGLYCEMIA, WITH LONG-TERM CURRENT USE OF INSULIN: ICD-10-CM

## 2025-08-01 DIAGNOSIS — I25.118 CORONARY ARTERY DISEASE OF NATIVE ARTERY OF NATIVE HEART WITH STABLE ANGINA PECTORIS: Chronic | ICD-10-CM

## 2025-08-01 DIAGNOSIS — E78.2 MIXED HYPERLIPIDEMIA: Chronic | ICD-10-CM

## 2025-08-01 DIAGNOSIS — I10 PRIMARY HYPERTENSION: Chronic | ICD-10-CM

## 2025-08-01 DIAGNOSIS — Z95.1 S/P CABG (CORONARY ARTERY BYPASS GRAFT): Chronic | ICD-10-CM

## 2025-08-01 RX ORDER — LOSARTAN POTASSIUM 100 MG/1
100 TABLET ORAL DAILY
Qty: 90 TABLET | Refills: 3 | Status: SHIPPED | OUTPATIENT
Start: 2025-08-01

## 2025-08-01 RX ORDER — ICOSAPENT ETHYL 1 G/1
2 CAPSULE ORAL 2 TIMES DAILY
Qty: 360 CAPSULE | Refills: 3 | Status: SHIPPED | OUTPATIENT
Start: 2025-08-01

## 2025-08-07 DIAGNOSIS — F41.9 ANXIETY AND DEPRESSION: ICD-10-CM

## 2025-08-07 DIAGNOSIS — F32.A ANXIETY AND DEPRESSION: ICD-10-CM

## 2025-08-08 DIAGNOSIS — F32.A ANXIETY AND DEPRESSION: ICD-10-CM

## 2025-08-08 DIAGNOSIS — F41.9 ANXIETY AND DEPRESSION: ICD-10-CM

## 2025-08-08 RX ORDER — DULOXETIN HYDROCHLORIDE 30 MG/1
30 CAPSULE, DELAYED RELEASE ORAL DAILY
Qty: 90 CAPSULE | Refills: 3 | Status: SHIPPED | OUTPATIENT
Start: 2025-08-08

## 2025-08-08 RX ORDER — DULOXETIN HYDROCHLORIDE 30 MG/1
30 CAPSULE, DELAYED RELEASE ORAL
Qty: 90 CAPSULE | Refills: 0 | OUTPATIENT
Start: 2025-08-08